# Patient Record
Sex: FEMALE | Race: WHITE | NOT HISPANIC OR LATINO | Employment: OTHER | URBAN - METROPOLITAN AREA
[De-identification: names, ages, dates, MRNs, and addresses within clinical notes are randomized per-mention and may not be internally consistent; named-entity substitution may affect disease eponyms.]

---

## 2017-04-19 ENCOUNTER — MYC MEDICAL ADVICE (OUTPATIENT)
Dept: OBGYN | Facility: CLINIC | Age: 67
End: 2017-04-19

## 2017-04-19 DIAGNOSIS — J45.21 EXTRINSIC ASTHMA WITH EXACERBATION, MILD INTERMITTENT: Primary | ICD-10-CM

## 2017-04-19 RX ORDER — ALBUTEROL SULFATE 90 UG/1
2 AEROSOL, METERED RESPIRATORY (INHALATION) EVERY 6 HOURS
Qty: 1 INHALER | Refills: 0 | Status: SHIPPED | OUTPATIENT
Start: 2017-04-19 | End: 2017-05-09

## 2017-04-19 NOTE — TELEPHONE ENCOUNTER
Pt requesting refill of albuterol (PROAIR HFA, PROVENTIL HFA, VENTOLIN HFA) 108 (90 BASE) MCG/ACT inhaler. Please see my chart message below. No note in chart under last visit regarding refill. Med not dispensed from this office.  Received from [facility], not her PCP. Pt is experiencing a cold or allergies now. Bronchial tube feels tight. Pt is feeling stuffed. Pt does not have a cough. Chest feels fine. Has nasal drainage. Denies fever. Routing to Dr. Chavarria. OK to refill

## 2017-11-13 ENCOUNTER — OFFICE VISIT (OUTPATIENT)
Dept: INTERNAL MEDICINE | Facility: CLINIC | Age: 67
End: 2017-11-13
Payer: COMMERCIAL

## 2017-11-13 VITALS
WEIGHT: 161.1 LBS | HEART RATE: 93 BPM | SYSTOLIC BLOOD PRESSURE: 145 MMHG | TEMPERATURE: 98.5 F | OXYGEN SATURATION: 98 % | HEIGHT: 64 IN | BODY MASS INDEX: 27.5 KG/M2 | DIASTOLIC BLOOD PRESSURE: 83 MMHG

## 2017-11-13 DIAGNOSIS — Z00.00 ENCOUNTER FOR ROUTINE ADULT HEALTH EXAMINATION WITHOUT ABNORMAL FINDINGS: Primary | ICD-10-CM

## 2017-11-13 DIAGNOSIS — F41.1 GENERALIZED ANXIETY DISORDER: ICD-10-CM

## 2017-11-13 DIAGNOSIS — E78.2 MIXED HYPERLIPIDEMIA: ICD-10-CM

## 2017-11-13 DIAGNOSIS — M81.0 OSTEOPOROSIS, UNSPECIFIED OSTEOPOROSIS TYPE, UNSPECIFIED PATHOLOGICAL FRACTURE PRESENCE: ICD-10-CM

## 2017-11-13 PROBLEM — Z87.898 H/O WHEEZING: Status: ACTIVE | Noted: 2017-11-13

## 2017-11-13 LAB — HGB BLD-MCNC: 14 G/DL (ref 11.7–15.7)

## 2017-11-13 PROCEDURE — 36415 COLL VENOUS BLD VENIPUNCTURE: CPT | Performed by: INTERNAL MEDICINE

## 2017-11-13 PROCEDURE — 99387 INIT PM E/M NEW PAT 65+ YRS: CPT | Performed by: INTERNAL MEDICINE

## 2017-11-13 PROCEDURE — 80061 LIPID PANEL: CPT | Performed by: INTERNAL MEDICINE

## 2017-11-13 PROCEDURE — 80053 COMPREHEN METABOLIC PANEL: CPT | Performed by: INTERNAL MEDICINE

## 2017-11-13 PROCEDURE — 85018 HEMOGLOBIN: CPT | Performed by: INTERNAL MEDICINE

## 2017-11-13 RX ORDER — ALENDRONATE SODIUM 70 MG/1
70 TABLET ORAL
Qty: 12 TABLET | Refills: 3 | Status: SHIPPED | OUTPATIENT
Start: 2017-11-13 | End: 2018-11-27

## 2017-11-13 RX ORDER — MECLIZINE HYDROCHLORIDE 25 MG/1
25 TABLET ORAL 3 TIMES DAILY PRN
Qty: 30 TABLET | Refills: 1 | Status: SHIPPED | OUTPATIENT
Start: 2017-11-13 | End: 2024-09-09

## 2017-11-13 RX ORDER — SIMVASTATIN 20 MG
20 TABLET ORAL AT BEDTIME
Qty: 90 TABLET | Refills: 3 | Status: SHIPPED | OUTPATIENT
Start: 2017-11-13 | End: 2018-11-11

## 2017-11-13 RX ORDER — PROPRANOLOL HYDROCHLORIDE 10 MG/1
10 TABLET ORAL 2 TIMES DAILY
Qty: 60 TABLET | Refills: 3 | Status: SHIPPED | OUTPATIENT
Start: 2017-11-13 | End: 2018-11-27

## 2017-11-13 NOTE — NURSING NOTE
"Chief Complaint   Patient presents with     [facility]     New pt.      Medicare Visit     Fasting for blood work, also need refill on medications.       Initial /83 (BP Location: Left arm, Patient Position: Sitting, Cuff Size: Adult Regular)  Pulse 93  Temp 98.5  F (36.9  C) (Oral)  Ht 5' 4\" (1.626 m)  Wt 161 lb 1.6 oz (73.1 kg)  SpO2 98%  BMI 27.65 kg/m2 Estimated body mass index is 27.65 kg/(m^2) as calculated from the following:    Height as of this encounter: 5' 4\" (1.626 m).    Weight as of this encounter: 161 lb 1.6 oz (73.1 kg).  Medication Reconciliation: complete   Lisa Mir MA      "

## 2017-11-13 NOTE — PROGRESS NOTES
SUBJECTIVE:   Carola Cox is a 67 year old female who presents for Preventive Visit.      Answers for HPI/ROS submitted by the patient on 11/10/2017   Annual Exam:  Getting at least 3 servings of Calcium per day:: Yes  Bi-annual eye exam:: Yes  Dental care twice a year:: Yes  Sleep apnea or symptoms of sleep apnea:: None  Diet:: Regular (no restrictions)  Frequency of exercise:: 2-3 days/week  Taking medications regularly:: Yes  Medication side effects:: None  Additional concerns today:: No  PHQ-2 Score: 0  Duration of exercise:: 15-30 minutes    Are you in the first 12 months of your Medicare Part B coverage?  No    COGNITIVE SCREEN  1) Repeat 3 items (Banana, Sunrise, Chair)    2) Clock draw: NORMAL  3) 3 item recall: Recalls 3 objects  Results: 3 items recalled: COGNITIVE IMPAIRMENT LESS LIKELY    Mini-CogTM Copyright S Rachel. Licensed by the author for use in [facility]; reprinted with permission (serafin@[facility]). All rights reserved.         Patient uses albuterol and inhaler when necessary for allergy induced bronchospasm.    H/o Anxiety 'Patient has tried several SSRIs in the past for anxiety. Has not tolerated any but currently taking propranolol 10 mg only as needed for anxiety.    Has history of cardiac dysrhythmia in the past has been evaluated by cardiology and everything was normal per patient.    Has history of positional vertigo and on meclizine as needed. Patient says if meclizine does not work. She [facility] to [facility] vestibular physical therapy.      Wants refill on all her medications today.    Reviewed and updated as needed this visit by clinical staffTobacco  Allergies  Med Hx  Surg Hx  Fam Hx  Soc Hx        Reviewed and updated as needed this visit by Provider          Past Medical History:   Diagnosis Date     Atrophic vaginitis      BPPV (benign paroxysmal positional vertigo)      Cardiac dysrhythmia     beta blocker for this in past      Endometriosis of pelvic peritoneum      Generalized anxiety disorder 2014    cannot tolerate SSRIs. Trial of 12.5mg sertraline and had severe nausea, diarrhea, dizziness. low dose propranolol prn and counseling      History of postmenopausal HRT      Hyperlipidemia      Osteoporosis        Past Surgical History:   Procedure Laterality Date     HYSTERECTOMY TOTAL ABDOMINAL, BILATERAL SALPINGO-OOPHORECTOMY, COMBINED      Endometriosis.     LITHOTRIPSY         Current Outpatient Prescriptions   Medication Sig Dispense Refill     alendronate (FOSAMAX) 70 MG tablet Take 1 tablet (70 mg) by mouth every 7 days Take with over 8 ounces water and stay upright for at least 30 minutes after dose.  Take at least 60 minutes before breakfast 12 tablet 3     meclizine (ANTIVERT) 25 MG tablet Take 1 tablet (25 mg) by mouth 3 times daily as needed for dizziness 30 tablet 1     propranolol (INDERAL) 10 MG tablet Take 1 tablet (10 mg) by mouth 2 times daily As needed for anxiety 60 tablet 3     simvastatin (ZOCOR) 20 MG tablet Take 1 tablet (20 mg) by mouth At Bedtime 90 tablet 3     VENTOLIN  (90 BASE) MCG/ACT Inhaler INHALE 2 PUFFS INTO THE LUNGS EVERY 6 HOURS 18 g 0     Calcium Carbonate-Vitamin D (CALCIUM + D PO)        Cholecalciferol (VITAMIN D PO)        [DISCONTINUED] alendronate (FOSAMAX) 70 MG tablet Take 1 tablet (70 mg) by mouth every 7 days Take with over 8 ounces water and stay upright for at least 30 minutes after dose.  Take at least 60 minutes before breakfast 12 tablet 3     [DISCONTINUED] propranolol (INDERAL) 10 MG tablet Take 1 tablet (10 mg) by mouth 3 times daily As needed for anxiety 30 tablet 3     [DISCONTINUED] simvastatin (ZOCOR) 20 MG tablet Take 1 tablet (20 mg) by mouth At Bedtime 90 tablet 3       Family History   Problem Relation Age of Onset     Hyperlipidemia Father      Hypertension Father      Breast Cancer Maternal Grandmother      HEART DISEASE Other      Family History     Thyroid Disease  Sister        Social History   Substance Use Topics     Smoking status: Former Smoker     Quit date: 1/6/1979     Smokeless tobacco: Never Used     Alcohol use 0.0 oz/week     0 Standard drinks or equivalent per week      Comment: very rare       Occasional    Today's PHQ-2 Score:   PHQ-2 ( 1999 [employer]) 11/13/2017 11/10/2017   Q1: Little interest or pleasure in doing things 0 0   Q2: Feeling down, depressed or hopeless 0 0   PHQ-2 Score 0 0   Q1: Little interest or pleasure in doing things - Not at all   Q2: Feeling down, depressed or hopeless - Not at all   PHQ-2 Score - 0         Do you feel safe in your environment - Yes    Do you have a Health Care Directive?: Yes: Advance Directive has been received and scanned.    Current providers sharing in care for this patient include: Patient Care Team:  Leann Sue MD as PCP - General (Internal Medicine)      Hearing impairment: No    Ability to successfully perform activities of daily living: Yes, no assistance needed     Fall risk:  Fallen 2 or more times in the past year?: No  Any fall with injury in the past year?: No      Home safety:  none identified      The following health maintenance items are reviewed in Epic and correct as of today:Health Maintenance   Topic Date Due     HEPATITIS C SCREENING  02/20/1966     ADVANCE DIRECTIVE PLANNING Q5 YRS  02/20/2003     INFLUENZA VACCINE (SYSTEM ASSIGNED)  09/01/2017     FALL RISK ASSESSMENT  11/10/2017     MAMMO SCREEN Q2 YR (SYSTEM ASSIGNED)  11/19/2017     DEXA Q2 YR  11/19/2017     COLONOSCOPY Q10 YR  01/01/2020     LIPID SCREEN Q5 YR FEMALE (SYSTEM ASSIGNED)  11/15/2021     TETANUS IMMUNIZATION (SYSTEM ASSIGNED)  11/01/2023     PNEUMOCOCCAL  Completed         ROS:  C: NEGATIVE for fever, chills, change in weight  I: NEGATIVE for worrisome rashes, moles or lesions  E: NEGATIVE for vision changes or irritation  E/M: NEGATIVE for ear, mouth and throat problems  R: NEGATIVE for significant cough or SOB  B:  "NEGATIVE for masses, tenderness or discharge  CV: NEGATIVE for chest pain, palpitations or peripheral edema  GI: NEGATIVE for nausea, abdominal pain, heartburn, or change in bowel habits  : NEGATIVE for frequency, dysuria, or hematuria  M: NEGATIVE for significant arthralgias or myalgia  N: NEGATIVE for weakness, dizziness or paresthesias  E: NEGATIVE for temperature intolerance, skin/hair changes  H: NEGATIVE for bleeding problems  P: NEGATIVE for changes in mood or affect    OBJECTIVE:   /83 (BP Location: Left arm, Patient Position: Sitting, Cuff Size: Adult Regular)  Pulse 93  Temp 98.5  F (36.9  C) (Oral)  Ht 5' 4\" (1.626 m)  Wt 161 lb 1.6 oz (73.1 kg)  SpO2 98%  BMI 27.65 kg/m2 Estimated body mass index is 27.65 kg/(m^2) as calculated from the following:    Height as of this encounter: 5' 4\" (1.626 m).    Weight as of this encounter: 161 lb 1.6 oz (73.1 kg).  EXAM:   GENERAL APPEARANCE: healthy, alert and no distress  EYES: Eyes grossly normal to inspection, PERRL and conjunctivae and sclerae normal  HENT: ear canals and TM's normal, nose and mouth without ulcers or lesions, oropharynx clear and oral mucous membranes moist  NECK: no adenopathy, no asymmetry, masses, or scars and thyroid normal to palpation  RESP: lungs clear to auscultation - no rales, rhonchi or wheezes  BREAST: normal without masses, tenderness or nipple discharge and no palpable axillary masses or adenopathy  CV: regular rate and rhythm, normal S1 S2, no S3 or S4, no murmur, click or rub, no peripheral edema and peripheral pulses strong  ABDOMEN: soft, nontender, no hepatosplenomegaly, no masses and bowel sounds normal  MS: no musculoskeletal defects are noted and gait is age appropriate without ataxia  SKIN: no suspicious lesions or rashes  NEURO: Normal strength and tone, sensory exam grossly normal, mentation intact and speech normal  PSYCH: mentation appears normal and affect normal/bright    ASSESSMENT / PLAN: " "      (Z00.00) Encounter for routine adult health examination without abnormal findings  (primary encounter diagnosis)  Plan: Hemoglobin, Comprehensive metabolic panel,         Lipid panel reflex to direct LDL Fasting. Mammogram.            (E78.2) Mixed hyperlipidemia  Comment: Check lipid panel  Plan: simvastatin (ZOCOR) 20 MG tablet refilled.explained clearly about the medication,insructions and side effects.            (M81.0) Osteoporosis, unspecified osteoporosis type, unspecified pathological fracture presence  Plan: alendronate (FOSAMAX) 70 MG tablet refilled.explained clearly about the medication,insructions and side effects.    (F41.1) Generalized anxiety disorder  Comment: Stable  Plan: propranolol (INDERAL) 10 MG tablet refilled.explained clearly about the medication,insructions and side effects.          (H81.13) Positional vertigo, bilateral  Comment: Stable  Plan: meclizine (ANTIVERT) 25 MG tablet refilled as directed,          End of Life Planning:  Patient currently has an advanced directive: Yes: Advance Directive has been received and scanned.    COUNSELING:  Reviewed preventive health counseling, as reflected in patient instructions       Regular exercise       Healthy diet/nutrition      Estimated body mass index is 27.65 kg/(m^2) as calculated from the following:    Height as of this encounter: 5' 4\" (1.626 m).    Weight as of this encounter: 161 lb 1.6 oz (73.1 kg).     reports that she quit smoking about 38 years ago. She has never used smokeless tobacco.        Appropriate preventive services were discussed with this patient, including applicable screening as appropriate for cardiovascular disease, diabetes, osteopenia/osteoporosis, and glaucoma.  As appropriate for age/gender, discussed screening for colorectal cancer, prostate cancer, breast cancer, and cervical cancer. Checklist reviewing preventive services available has been given to the patient.    Reviewed patients plan of care and " provided an AVS. The Basic Care Plan (routine screening as documented in Health Maintenance) for Carola meets the Care Plan requirement. This Care Plan has been established and reviewed with the Patient.    Counseling Resources:  ATP IV Guidelines  Pooled Cohorts Equation Calculator  Breast Cancer Risk Calculator  FRAX Risk Assessment  ICSI Preventive Guidelines  Dietary Guidelines for Americans, 2010  USDA's MyPlate  ASA Prophylaxis  Lung CA Screening    Leann Sue MD  [facility]

## 2017-11-13 NOTE — MR AVS SNAPSHOT
After Visit Summary   11/13/2017    Carola Cox    MRN: [id number]           Patient Information     Date Of Birth          1950        Visit Information        Provider Department      11/13/2017 9:20 AM Leann Sue MD [facility]        Today's Diagnoses     Encounter for routine adult health examination without abnormal findings    -  1    Mixed hyperlipidemia        Osteoporosis, unspecified osteoporosis type, unspecified pathological fracture presence        Generalized anxiety disorder        Positional vertigo, bilateral          Care Instructions      Preventive Health Recommendations    Female Ages 65 +    Yearly exam:     See your health care provider every year in order to  o Review health changes.   o Discuss preventive care.    o Review your medicines if your doctor has prescribed any.      You no longer need a yearly Pap test unless you've had an abnormal Pap test in the past 10 years. If you have vaginal symptoms, such as bleeding or discharge, be sure to talk with your provider about a Pap test.      Every 1 to 2 years, have a mammogram.  If you are over 69, talk with your health care provider about whether or not you want to continue having screening mammograms.      Every 10 years, have a colonoscopy. Or, have a yearly FIT test (stool test). These exams will check for colon cancer.       Have a cholesterol test every 5 years, or more often if your doctor advises it.       Have a diabetes test (fasting glucose) every three years. If you are at risk for diabetes, you should have this test more often.       At age 65, have a bone density scan (DEXA) to check for osteoporosis (brittle bone disease).    Shots:    Get a flu shot each year.    Get a tetanus shot every 10 years.    Talk to your doctor about your pneumonia vaccines. There are now two you should receive - Pneumovax (PPSV 23) and Prevnar (PCV 13).    Talk to your doctor about the  shingles vaccine.    Talk to your doctor about the hepatitis B vaccine.    Nutrition:     Eat at least 5 servings of fruits and vegetables each day.      Eat whole-grain bread, whole-wheat pasta and brown rice instead of white grains and rice.      Talk to your provider about Calcium and Vitamin D.     Lifestyle    Exercise at least 150 minutes a week (30 minutes a day, 5 days a week). This will help you control your weight and prevent disease.      Limit alcohol to one drink per day.      No smoking.       Wear sunscreen to prevent skin cancer.       See your dentist twice a year for an exam and cleaning.      See your eye doctor every 1 to 2 years to screen for conditions such as glaucoma, macular degeneration and cataracts.          Follow-ups after your visit        Who to contact     If you have questions or need follow up information about today's clinic visit or your schedule please contact [facility] directly at [phone].  Normal or non-critical lab and imaging results will be communicated to you by [employer], letter or phone within 4 business days after the clinic has received the results. If you do not hear from us within 7 days, please contact the clinic through [employer] or phone. If you have a critical or abnormal lab result, we will notify you by phone as soon as possible.  Submit refill requests through [employer] or call your pharmacy and they will forward the refill request to us. Please allow 3 business days for your refill to be completed.          Additional Information About Your Visit        [employer] Information     [employer] gives you secure access to your electronic health record. If you see a primary care provider, you can also send messages to your care team and make appointments. If you have questions, please call your primary care clinic.  If you do not have a primary care provider, please call [phone] and they will assist you.        Care EveryWhere ID     This is your  "Care EveryWhere ID. This could be used by other organizations to access your [facility] medical records  [id number]        Your Vitals Were     Pulse Temperature Height Pulse Oximetry BMI (Body Mass Index)       93 98.5  F (36.9  C) (Oral) 5' 4\" (1.626 m) 98% 27.65 kg/m2        Blood Pressure from Last 3 Encounters:   11/13/17 145/83   11/10/16 102/64   02/02/16 130/76    Weight from Last 3 Encounters:   11/13/17 161 lb 1.6 oz (73.1 kg)   11/10/16 165 lb (74.8 kg)   02/02/16 164 lb (74.4 kg)              We Performed the Following     Comprehensive metabolic panel     Hemoglobin     Lipid panel reflex to direct LDL Fasting          Today's Medication Changes          These changes are accurate as of: 11/13/17 10:32 AM.  If you have any questions, ask your nurse or doctor.               These medicines have changed or have updated prescriptions.        Dose/Directions    propranolol 10 MG tablet   Commonly known as:  INDERAL   This may have changed:  when to take this   Used for:  Generalized anxiety disorder   Changed by:  Leann Sue MD        Dose:  10 mg   Take 1 tablet (10 mg) by mouth 2 times daily As needed for anxiety   Quantity:  60 tablet   Refills:  3            Where to get your medicines      These medications were sent to [facility] [address] [place] [address] - [address] AT [place] [address] & [facility]  [address], [facility] [address]     Phone:  [phone]     meclizine 25 MG tablet    propranolol 10 MG tablet    simvastatin 20 MG tablet         Some of these will need a paper prescription and others can be bought over the counter.  Ask your nurse if you have questions.     Bring a paper prescription for each of these medications     alendronate 70 MG tablet                Primary Care Provider Office Phone # Fax #    Leann Sue [phone]       [address]  [facility] [address]        Equal Access to Services     " OLEKSANDR [facility]: Hadii aad ku day Soelvira, waaxda luqadaha, qaybta kaalmada adericarda, gracy alex miltonevelio mcghee nubiakevin boland . So [facility] [phone].    ATENCIÓN: Si habla español, tiene a das disposición servicios gratuitos de asistencia lingüística. Llame al [phone].    We comply with applicable federal civil rights laws and [place] laws. We do not discriminate on the basis of race, color, national origin, age, disability, sex, sexual orientation, or gender identity.            Thank you!     Thank you for choosing [facility]  for your care. Our goal is always to provide you with excellent care. Hearing back from our patients is one way we can continue to improve our services. Please take a few minutes to complete the written survey that you may receive in the mail after your visit with us. Thank you!             Your Updated Medication List - Protect others around you: Learn how to safely use, store and throw away your medicines at www.disposemymeds.org.          This list is accurate as of: 11/13/17 10:32 AM.  Always use your most recent med list.                   Brand Name Dispense Instructions for use Diagnosis    alendronate 70 MG tablet    FOSAMAX    12 tablet    Take 1 tablet (70 mg) by mouth every 7 days Take with over 8 ounces water and stay upright for at least 30 minutes after dose.  Take at least 60 minutes before breakfast    Osteoporosis, unspecified osteoporosis type, unspecified pathological fracture presence       CALCIUM + D PO           meclizine 25 MG tablet    ANTIVERT    30 tablet    Take 1 tablet (25 mg) by mouth 3 times daily as needed for dizziness    Positional vertigo, bilateral       propranolol 10 MG tablet    INDERAL    60 tablet    Take 1 tablet (10 mg) by mouth 2 times daily As needed for anxiety    Generalized anxiety disorder       simvastatin 20 MG tablet    ZOCOR    90 tablet    Take 1 tablet (20 mg) by mouth At Bedtime    Mixed hyperlipidemia        VENTOLIN  (90 BASE) MCG/ACT Inhaler   Generic drug:  albuterol     18 g    INHALE 2 PUFFS INTO THE LUNGS EVERY 6 HOURS    Extrinsic asthma with exacerbation, mild intermittent       VITAMIN D PO

## 2017-11-14 LAB
ALBUMIN SERPL-MCNC: 3.7 G/DL (ref 3.4–5)
ALP SERPL-CCNC: 59 U/L (ref 40–150)
ALT SERPL W P-5'-P-CCNC: 31 U/L (ref 0–50)
ANION GAP SERPL CALCULATED.3IONS-SCNC: 10 MMOL/L (ref 3–14)
AST SERPL W P-5'-P-CCNC: 21 U/L (ref 0–45)
BILIRUB SERPL-MCNC: 0.4 MG/DL (ref 0.2–1.3)
BUN SERPL-MCNC: 10 MG/DL (ref 7–30)
CALCIUM SERPL-MCNC: 9.4 MG/DL (ref 8.5–10.1)
CHLORIDE SERPL-SCNC: 108 MMOL/L (ref 94–109)
CHOLEST SERPL-MCNC: 149 MG/DL
CO2 SERPL-SCNC: 24 MMOL/L (ref 20–32)
CREAT SERPL-MCNC: 0.73 MG/DL (ref 0.52–1.04)
GFR SERPL CREATININE-BSD FRML MDRD: 79 ML/MIN/1.7M2
GLUCOSE SERPL-MCNC: 90 MG/DL (ref 70–99)
HDLC SERPL-MCNC: 68 MG/DL
LDLC SERPL CALC-MCNC: 64 MG/DL
NONHDLC SERPL-MCNC: 81 MG/DL
POTASSIUM SERPL-SCNC: 3.9 MMOL/L (ref 3.4–5.3)
PROT SERPL-MCNC: 7 G/DL (ref 6.8–8.8)
SODIUM SERPL-SCNC: 142 MMOL/L (ref 133–144)
TRIGL SERPL-MCNC: 86 MG/DL

## 2017-11-27 ENCOUNTER — RADIANT APPOINTMENT (OUTPATIENT)
Dept: MAMMOGRAPHY | Facility: CLINIC | Age: 67
End: 2017-11-27
Payer: COMMERCIAL

## 2017-11-27 DIAGNOSIS — Z12.39 SCREENING FOR BREAST CANCER: ICD-10-CM

## 2017-11-27 PROCEDURE — G0202 SCR MAMMO BI INCL CAD: HCPCS | Mod: TC

## 2018-11-11 DIAGNOSIS — E78.2 MIXED HYPERLIPIDEMIA: ICD-10-CM

## 2018-11-11 NOTE — LETTER
[place] [address]  [facility] [address]  Phone: [phone]        November 13, 2018      Carola Cox                                                                                                                                [address] [facility] [address]            Dear Ms. Cox,    We are concerned about your health care.  We recently provided you with a medication refill.  Many medications require routine follow-up with your Doctor.      At this time we ask that: You schedule a fasting lab appointment and an appointment to see your physician 1-2 days later.    Your prescription: Has been refilled for 1 month so you may have time for the above noted follow-up.      Thank you,      Dr. Sue/ triage nurses

## 2018-11-12 NOTE — TELEPHONE ENCOUNTER
"Requested Prescriptions   Pending Prescriptions Disp Refills     simvastatin (ZOCOR) 20 MG tablet [Pharmacy Med Name: SIMVASTATIN 20MG TABLETS]  Last Written Prescription Date:  11/13/2017  Last Fill Quantity: 90,  # refills: 3   Last office visit: 11/13/2017 with prescribing provider:     Future Office Visit:   90 tablet 0     Sig: TAKE 1 TABLET(20 MG) BY MOUTH AT BEDTIME    Statins Protocol Passed    11/11/2018  4:06 PM       Passed - LDL on file in past 12 months    Recent Labs   Lab Test  11/13/17   0957   LDL  64            Passed - No abnormal creatine kinase in past 12 months    No lab results found.            Passed - Recent (12 mo) or future (30 days) visit within the authorizing provider's specialty    Patient had office visit in the last 12 months or has a visit in the next 30 days with authorizing provider or within the authorizing provider's specialty.  See \"Patient Info\" tab in inbasket, or \"Choose Columns\" in Meds & Orders section of the refill encounter.             Passed - Patient is age 18 or older       Passed - No active pregnancy on record       Passed - No positive pregnancy test in past 12 months        "

## 2018-11-13 RX ORDER — SIMVASTATIN 20 MG
TABLET ORAL
Qty: 30 TABLET | Refills: 0 | Status: SHIPPED | OUTPATIENT
Start: 2018-11-13 | End: 2018-11-27

## 2018-11-13 NOTE — TELEPHONE ENCOUNTER
Medication is being filled for 1 time refill only due to:  Patient needs fasting labs and an office visit.       Letter sent advising pt she is due for an office visit after having fasting labs drawn.  DOYLE Nova R.N.

## 2018-11-23 DIAGNOSIS — E78.2 MIXED HYPERLIPIDEMIA: ICD-10-CM

## 2018-11-23 PROCEDURE — 36415 COLL VENOUS BLD VENIPUNCTURE: CPT | Performed by: INTERNAL MEDICINE

## 2018-11-23 PROCEDURE — 80061 LIPID PANEL: CPT | Performed by: INTERNAL MEDICINE

## 2018-11-23 PROCEDURE — 80053 COMPREHEN METABOLIC PANEL: CPT | Performed by: INTERNAL MEDICINE

## 2018-11-24 LAB
ALBUMIN SERPL-MCNC: 3.7 G/DL (ref 3.4–5)
ALP SERPL-CCNC: 61 U/L (ref 40–150)
ALT SERPL W P-5'-P-CCNC: 46 U/L (ref 0–50)
ANION GAP SERPL CALCULATED.3IONS-SCNC: 5 MMOL/L (ref 3–14)
AST SERPL W P-5'-P-CCNC: 42 U/L (ref 0–45)
BILIRUB SERPL-MCNC: 0.4 MG/DL (ref 0.2–1.3)
BUN SERPL-MCNC: 14 MG/DL (ref 7–30)
CALCIUM SERPL-MCNC: 9.2 MG/DL (ref 8.5–10.1)
CHLORIDE SERPL-SCNC: 108 MMOL/L (ref 94–109)
CHOLEST SERPL-MCNC: 158 MG/DL
CO2 SERPL-SCNC: 27 MMOL/L (ref 20–32)
CREAT SERPL-MCNC: 0.82 MG/DL (ref 0.52–1.04)
GFR SERPL CREATININE-BSD FRML MDRD: 69 ML/MIN/1.7M2
GLUCOSE SERPL-MCNC: 96 MG/DL (ref 70–99)
HDLC SERPL-MCNC: 60 MG/DL
LDLC SERPL CALC-MCNC: 80 MG/DL
NONHDLC SERPL-MCNC: 98 MG/DL
POTASSIUM SERPL-SCNC: 3.8 MMOL/L (ref 3.4–5.3)
PROT SERPL-MCNC: 7.4 G/DL (ref 6.8–8.8)
SODIUM SERPL-SCNC: 140 MMOL/L (ref 133–144)
TRIGL SERPL-MCNC: 90 MG/DL

## 2018-11-27 ENCOUNTER — OFFICE VISIT (OUTPATIENT)
Dept: INTERNAL MEDICINE | Facility: CLINIC | Age: 68
End: 2018-11-27
Payer: COMMERCIAL

## 2018-11-27 VITALS
DIASTOLIC BLOOD PRESSURE: 70 MMHG | BODY MASS INDEX: 28.61 KG/M2 | WEIGHT: 166.7 LBS | HEART RATE: 99 BPM | OXYGEN SATURATION: 98 % | SYSTOLIC BLOOD PRESSURE: 112 MMHG | TEMPERATURE: 97.4 F | RESPIRATION RATE: 13 BRPM

## 2018-11-27 DIAGNOSIS — F41.1 GENERALIZED ANXIETY DISORDER: ICD-10-CM

## 2018-11-27 DIAGNOSIS — E78.2 MIXED HYPERLIPIDEMIA: ICD-10-CM

## 2018-11-27 DIAGNOSIS — Z23 NEED FOR TDAP VACCINATION: Primary | ICD-10-CM

## 2018-11-27 DIAGNOSIS — M81.0 OSTEOPOROSIS, UNSPECIFIED OSTEOPOROSIS TYPE, UNSPECIFIED PATHOLOGICAL FRACTURE PRESENCE: ICD-10-CM

## 2018-11-27 PROCEDURE — 90715 TDAP VACCINE 7 YRS/> IM: CPT | Performed by: INTERNAL MEDICINE

## 2018-11-27 PROCEDURE — G0008 ADMIN INFLUENZA VIRUS VAC: HCPCS | Performed by: INTERNAL MEDICINE

## 2018-11-27 PROCEDURE — 99214 OFFICE O/P EST MOD 30 MIN: CPT | Mod: 25 | Performed by: INTERNAL MEDICINE

## 2018-11-27 RX ORDER — PROPRANOLOL HYDROCHLORIDE 10 MG/1
10 TABLET ORAL 2 TIMES DAILY
Qty: 60 TABLET | Refills: 3 | Status: SHIPPED | OUTPATIENT
Start: 2018-11-27 | End: 2019-08-01

## 2018-11-27 RX ORDER — VENLAFAXINE HYDROCHLORIDE 37.5 MG/1
37.5 CAPSULE, EXTENDED RELEASE ORAL DAILY
Qty: 30 CAPSULE | Refills: 3 | Status: SHIPPED | OUTPATIENT
Start: 2018-11-27 | End: 2019-07-22

## 2018-11-27 RX ORDER — SIMVASTATIN 20 MG
20 TABLET ORAL AT BEDTIME
Qty: 90 TABLET | Refills: 3 | Status: SHIPPED | OUTPATIENT
Start: 2018-11-27 | End: 2020-01-30

## 2018-11-27 RX ORDER — ALENDRONATE SODIUM 70 MG/1
70 TABLET ORAL
Qty: 12 TABLET | Refills: 3 | Status: SHIPPED | OUTPATIENT
Start: 2018-11-27 | End: 2019-11-06

## 2018-11-27 ASSESSMENT — ANXIETY QUESTIONNAIRES
2. NOT BEING ABLE TO STOP OR CONTROL WORRYING: NEARLY EVERY DAY
3. WORRYING TOO MUCH ABOUT DIFFERENT THINGS: NEARLY EVERY DAY
7. FEELING AFRAID AS IF SOMETHING AWFUL MIGHT HAPPEN: SEVERAL DAYS
5. BEING SO RESTLESS THAT IT IS HARD TO SIT STILL: NOT AT ALL
6. BECOMING EASILY ANNOYED OR IRRITABLE: NOT AT ALL
1. FEELING NERVOUS, ANXIOUS, OR ON EDGE: SEVERAL DAYS
GAD7 TOTAL SCORE: 8
IF YOU CHECKED OFF ANY PROBLEMS ON THIS QUESTIONNAIRE, HOW DIFFICULT HAVE THESE PROBLEMS MADE IT FOR YOU TO DO YOUR WORK, TAKE CARE OF THINGS AT HOME, OR GET ALONG WITH OTHER PEOPLE: SOMEWHAT DIFFICULT

## 2018-11-27 ASSESSMENT — PATIENT HEALTH QUESTIONNAIRE - PHQ9: 5. POOR APPETITE OR OVEREATING: NOT AT ALL

## 2018-11-27 NOTE — NURSING NOTE
/70  Pulse 99  Temp 97.4  F (36.3  C) (Oral)  Resp 13  Wt 166 lb 11.2 oz (75.6 kg)  SpO2 98%  BMI 28.61 kg/m2  Patient in for follow up on anxiety and lipids.  Lyndsey Arredondo, CMA

## 2018-11-27 NOTE — MR AVS SNAPSHOT
After Visit Summary   11/27/2018    Carola Cox    MRN: [id number]           Patient Information     Date Of Birth          1950        Visit Information        Provider Department      11/27/2018 10:40 AM Leann Sue MD [facility]        Today's Diagnoses     Need for Tdap vaccination    -  1    Generalized anxiety disorder        Osteoporosis, unspecified osteoporosis type, unspecified pathological fracture presence        Mixed hyperlipidemia           Follow-ups after your visit        Who to contact     If you have questions or need follow up information about today's clinic visit or your schedule please contact [facility] directly at [phone].  Normal or non-critical lab and imaging results will be communicated to you by MyChart, letter or phone within 4 business days after the clinic has received the results. If you do not hear from us within 7 days, please contact the clinic through [employer] or phone. If you have a critical or abnormal lab result, we will notify you by phone as soon as possible.  Submit refill requests through [employer] or call your pharmacy and they will forward the refill request to us. Please allow 3 business days for your refill to be completed.          Additional Information About Your Visit        MyChart Information     [employer] gives you secure access to your electronic health record. If you see a primary care provider, you can also send messages to your care team and make appointments. If you have questions, please call your primary care clinic.  If you do not have a primary care provider, please call [phone] and they will assist you.        Care EveryWhere ID     This is your Care EveryWhere ID. This could be used by other organizations to access your [facility] records  [id number]        Your Vitals Were     Pulse Temperature Respirations Pulse Oximetry BMI (Body Mass Index)       99 97.4  F  (36.3  C) (Oral) 13 98% 28.61 kg/m2        Blood Pressure from Last 3 Encounters:   11/27/18 112/70   11/13/17 145/83   11/10/16 102/64    Weight from Last 3 Encounters:   11/27/18 166 lb 11.2 oz (75.6 kg)   11/13/17 161 lb 1.6 oz (73.1 kg)   11/10/16 165 lb (74.8 kg)              We Performed the Following     TDAP VACCINE (ADACEL)          Today's Medication Changes          These changes are accurate as of 11/27/18  6:30 PM.  If you have any questions, ask your nurse or doctor.               Start taking these medicines.        Dose/Directions    venlafaxine 37.5 MG 24 hr capsule   Commonly known as:  EFFEXOR-XR   Used for:  Generalized anxiety disorder   Started by:  Leann Sue MD        Dose:  37.5 mg   Take 1 capsule (37.5 mg) by mouth daily   Quantity:  30 capsule   Refills:  3         These medicines have changed or have updated prescriptions.        Dose/Directions    simvastatin 20 MG tablet   Commonly known as:  ZOCOR   This may have changed:  See the new instructions.   Used for:  Mixed hyperlipidemia   Changed by:  Leann Sue MD        Dose:  20 mg   Take 1 tablet (20 mg) by mouth At Bedtime   Quantity:  90 tablet   Refills:  3            Where to get your medicines      These medications were sent to [facility] [address] [place] [address] [id number] AT [place] [address] [address]  [address], [facility] [address]     Phone:  [phone]     propranolol 10 MG tablet    simvastatin 20 MG tablet         Some of these will need a paper prescription and others can be bought over the counter.  Ask your nurse if you have questions.     Bring a paper prescription for each of these medications     alendronate 70 MG tablet    venlafaxine 37.5 MG 24 hr capsule                Primary Care Provider Office Phone # Fax #    Leann Sue [phone]       [address]  [facility] [address]        Equal Access to  Services     [facility]: Hadii aad ku hadaurelianimesh Brittanielvira, waaxda luqadaha, qaybta kaalmada garth, gracy boland . So [facility] [phone].    ATENCIÓN: Si habla coco, tiene a das disposición servicios gratuitos de asistencia lingüística. Llame al [phone].    We comply with applicable federal civil rights laws and [place] laws. We do not discriminate on the basis of race, color, national origin, age, disability, sex, sexual orientation, or gender identity.            Thank you!     Thank you for choosing [facility]  for your care. Our goal is always to provide you with excellent care. Hearing back from our patients is one way we can continue to improve our services. Please take a few minutes to complete the written survey that you may receive in the mail after your visit with us. Thank you!             Your Updated Medication List - Protect others around you: Learn how to safely use, store and throw away your medicines at www.disposemymeds.org.          This list is accurate as of 11/27/18  6:30 PM.  Always use your most recent med list.                   Brand Name Dispense Instructions for use Diagnosis    alendronate 70 MG tablet    FOSAMAX    12 tablet    Take 1 tablet (70 mg) by mouth every 7 days Take with over 8 ounces water and stay upright for at least 30 minutes after dose.  Take at least 60 minutes before breakfast    Osteoporosis, unspecified osteoporosis type, unspecified pathological fracture presence       CALCIUM + D PO           meclizine 25 MG tablet    ANTIVERT    30 tablet    Take 1 tablet (25 mg) by mouth 3 times daily as needed for dizziness    Positional vertigo, bilateral       propranolol 10 MG tablet    INDERAL    60 tablet    Take 1 tablet (10 mg) by mouth 2 times daily As needed for anxiety    Generalized anxiety disorder       simvastatin 20 MG tablet    ZOCOR    90 tablet    Take 1 tablet (20 mg) by mouth At Bedtime    Mixed  hyperlipidemia       venlafaxine 37.5 MG 24 hr capsule    EFFEXOR-XR    30 capsule    Take 1 capsule (37.5 mg) by mouth daily    Generalized anxiety disorder       VENTOLIN  (90 Base) MCG/ACT inhaler   Generic drug:  albuterol     1 Inhaler    INHALE 2 PUFFS INTO THE LUNGS EVERY 6 HOURS    H/O wheezing       VITAMIN D PO

## 2018-11-28 ASSESSMENT — ANXIETY QUESTIONNAIRES: GAD7 TOTAL SCORE: 8

## 2019-07-11 ENCOUNTER — TELEPHONE (OUTPATIENT)
Dept: INTERNAL MEDICINE | Facility: CLINIC | Age: 69
End: 2019-07-11

## 2019-07-11 DIAGNOSIS — R53.83 OTHER FATIGUE: Primary | ICD-10-CM

## 2019-07-11 NOTE — TELEPHONE ENCOUNTER
Please see message below.    Please place future order if appropriate.    Last office visit 11-27-18    Please advise, thanks.

## 2019-07-11 NOTE — TELEPHONE ENCOUNTER
Pt calling requesting thyroid lab orders. She stated she has been experiencing fatigue, dry skin and weight gain since Spring. She also stated that she has a strong family history of thyroid issues. Pt has Pre-op scheduled 7/31/19 with Dr. Sue and would like to go over results at that appointment. Pt can be reached at [phone]. OK to leave a detailed message. Please advise. Thanks.

## 2019-07-12 DIAGNOSIS — R53.83 OTHER FATIGUE: ICD-10-CM

## 2019-07-12 LAB — TSH SERPL DL<=0.005 MIU/L-ACNC: 0.95 MU/L (ref 0.4–4)

## 2019-07-12 PROCEDURE — 84443 ASSAY THYROID STIM HORMONE: CPT | Performed by: INTERNAL MEDICINE

## 2019-07-12 PROCEDURE — 36415 COLL VENOUS BLD VENIPUNCTURE: CPT | Performed by: INTERNAL MEDICINE

## 2019-07-22 ENCOUNTER — APPOINTMENT (OUTPATIENT)
Dept: CARDIOLOGY | Facility: CLINIC | Age: 69
End: 2019-07-22
Attending: EMERGENCY MEDICINE
Payer: MEDICARE

## 2019-07-22 ENCOUNTER — HOSPITAL ENCOUNTER (EMERGENCY)
Facility: CLINIC | Age: 69
Discharge: HOME | End: 2019-07-22
Attending: EMERGENCY MEDICINE | Admitting: EMERGENCY MEDICINE
Payer: MEDICARE

## 2019-07-22 VITALS
SYSTOLIC BLOOD PRESSURE: 146 MMHG | HEIGHT: 64 IN | RESPIRATION RATE: 11 BRPM | WEIGHT: 160 LBS | OXYGEN SATURATION: 97 % | DIASTOLIC BLOOD PRESSURE: 74 MMHG | HEART RATE: 67 BPM | TEMPERATURE: 98.5 F | BODY MASS INDEX: 27.31 KG/M2

## 2019-07-22 DIAGNOSIS — I49.3 PVC'S (PREMATURE VENTRICULAR CONTRACTIONS): ICD-10-CM

## 2019-07-22 DIAGNOSIS — R00.2 PALPITATIONS: ICD-10-CM

## 2019-07-22 LAB
ANION GAP SERPL CALCULATED.3IONS-SCNC: 5 MMOL/L (ref 3–14)
BASOPHILS # BLD AUTO: 0.1 10E9/L (ref 0–0.2)
BASOPHILS NFR BLD AUTO: 0.7 %
BUN SERPL-MCNC: 11 MG/DL (ref 7–30)
CALCIUM SERPL-MCNC: 9 MG/DL (ref 8.5–10.1)
CHLORIDE SERPL-SCNC: 110 MMOL/L (ref 94–109)
CO2 SERPL-SCNC: 27 MMOL/L (ref 20–32)
CREAT SERPL-MCNC: 0.68 MG/DL (ref 0.52–1.04)
DIFFERENTIAL METHOD BLD: NORMAL
EOSINOPHIL # BLD AUTO: 0.2 10E9/L (ref 0–0.7)
EOSINOPHIL NFR BLD AUTO: 1.8 %
ERYTHROCYTE [DISTWIDTH] IN BLOOD BY AUTOMATED COUNT: 12.9 % (ref 10–15)
GFR SERPL CREATININE-BSD FRML MDRD: 88 ML/MIN/{1.73_M2}
GLUCOSE SERPL-MCNC: 102 MG/DL (ref 70–99)
HCT VFR BLD AUTO: 45.9 % (ref 35–47)
HGB BLD-MCNC: 14.8 G/DL (ref 11.7–15.7)
IMM GRANULOCYTES # BLD: 0 10E9/L (ref 0–0.4)
IMM GRANULOCYTES NFR BLD: 0.1 %
INTERPRETATION ECG - MUSE: NORMAL
LYMPHOCYTES # BLD AUTO: 1.5 10E9/L (ref 0.8–5.3)
LYMPHOCYTES NFR BLD AUTO: 18.6 %
MAGNESIUM SERPL-MCNC: 2.4 MG/DL (ref 1.6–2.3)
MCH RBC QN AUTO: 29.8 PG (ref 26.5–33)
MCHC RBC AUTO-ENTMCNC: 32.2 G/DL (ref 31.5–36.5)
MCV RBC AUTO: 93 FL (ref 78–100)
MONOCYTES # BLD AUTO: 0.6 10E9/L (ref 0–1.3)
MONOCYTES NFR BLD AUTO: 7.3 %
NEUTROPHILS # BLD AUTO: 5.8 10E9/L (ref 1.6–8.3)
NEUTROPHILS NFR BLD AUTO: 71.5 %
NRBC # BLD AUTO: 0 10*3/UL
NRBC BLD AUTO-RTO: 0 /100
PLATELET # BLD AUTO: 233 10E9/L (ref 150–450)
POTASSIUM SERPL-SCNC: 3.8 MMOL/L (ref 3.4–5.3)
RBC # BLD AUTO: 4.96 10E12/L (ref 3.8–5.2)
SODIUM SERPL-SCNC: 142 MMOL/L (ref 133–144)
TROPONIN I SERPL-MCNC: <0.015 UG/L (ref 0–0.04)
WBC # BLD AUTO: 8.2 10E9/L (ref 4–11)

## 2019-07-22 PROCEDURE — 0296T ZIO PATCH HOLTER ADULT PEDIATRIC GREATER THAN 48 HRS: CPT

## 2019-07-22 PROCEDURE — 80048 BASIC METABOLIC PNL TOTAL CA: CPT | Performed by: EMERGENCY MEDICINE

## 2019-07-22 PROCEDURE — 84484 ASSAY OF TROPONIN QUANT: CPT | Performed by: EMERGENCY MEDICINE

## 2019-07-22 PROCEDURE — 93005 ELECTROCARDIOGRAM TRACING: CPT | Mod: 59

## 2019-07-22 PROCEDURE — 0298T ZZC EXT ECG > 48HR TO 21 DAY REVIEW AND INTERPRETATN: CPT | Performed by: INTERNAL MEDICINE

## 2019-07-22 PROCEDURE — 99284 EMERGENCY DEPT VISIT MOD MDM: CPT

## 2019-07-22 PROCEDURE — 83735 ASSAY OF MAGNESIUM: CPT | Performed by: EMERGENCY MEDICINE

## 2019-07-22 PROCEDURE — 85025 COMPLETE CBC W/AUTO DIFF WBC: CPT | Performed by: EMERGENCY MEDICINE

## 2019-07-22 ASSESSMENT — ENCOUNTER SYMPTOMS
VOMITING: 0
COUGH: 0
NAUSEA: 0
FEVER: 0
DIAPHORESIS: 0
LIGHT-HEADEDNESS: 0
PALPITATIONS: 1
SHORTNESS OF BREATH: 0

## 2019-07-22 ASSESSMENT — MIFFLIN-ST. JEOR: SCORE: 1225.76

## 2019-07-22 NOTE — ED PROVIDER NOTES
History     Chief Complaint:  Palpitations    The history is provided by the patient.      Carola Cox is a 69 year old female, with a history of hyperlipidemia, who presents to the ED for evaluation of palpitations. The patient reports she had a cardiac monitor approximately 35 years ago for intermittent palpitations. She was placed on a small dose of Atenolol. About 10 years ago, she asked her cardiologist if she could be taken off Atenolol and this was agreed on. The patient notes she developed palpitations that is worse and persists longer than usual at 10:30AM today. She feels as her heart is skipping a beat. She did take Propranolol. She is prescribed Propranolol for anxiety which she takes intermittently. The patient denies any fever, diaphoresis, lightheadedness, cough, shortness of breath, chest pain, nausea, or vomiting.     CARDIAC RISK FACTORS:  Sex:    Female  Tobacco:   Former smoker, Quit 1979  Hypertension:   No  Hyperlipidemia:  Yes  Diabetes:   No  Family History:  No    PE/DVT RISK FACTORS:  Sex:    Female  Hormones:   No  Tobacco:   Former smoker, Quit 1979  Cancer:   No  Travel:   No  Surgery:   No  Other immobilization: No  Personal history:  No  Family history:  No    Allergies:  Penicillin V: hives   Cefzil: hives      Medications:    Fosamax  Vitamin D  Inderal  Simvastatin   Meclizine  Ventolin inhaler  Advair inhaler  Calcium-Vitamin D2  Xanax  Asmanex inhaler    Past Medical History:    Kidney stones  Atrophic vaginitis   Osteoporosis  Anxiety   Endometriosis, pelvic peritoneum  BPPV  HLD  Asthma   GERD  Uninodular goiter    Past Surgical History:    SANDY-BSO  Lithotripsy   Tonsillectomy    Family History:    HLD, HTN: father  Thyroid disease: sister     Social History:  Smoking status: Former smoker, Quit 1984  Alcohol use: Very rare  Presents to ED alone    Marital Status:   [5]     Review of Systems   Constitutional: Negative for diaphoresis and fever.   Respiratory:  "Negative for cough and shortness of breath.    Cardiovascular: Positive for palpitations. Negative for chest pain.   Gastrointestinal: Negative for nausea and vomiting.   Neurological: Negative for light-headedness.   All other systems reviewed and are negative.    Physical Exam     Patient Vitals for the past 24 hrs:   BP Temp Temp src Pulse Heart Rate Resp SpO2 Height Weight   07/22/19 1400 (!) 147/107 -- -- 69 67 18 96 % -- --   07/22/19 1237 160/85 98.5  F (36.9  C) Oral 68 -- 12 98 % -- --   07/22/19 1235 -- -- -- -- -- -- -- 1.626 m (5' 4\") 72.6 kg (160 lb)     Physical Exam  Constitutional: Vital signs reviewed as above.   Head: No external signs of trauma noted.  Eyes: Pupils are equal, round, and reactive to light.   Neck: No JVD noted  Cardiovascular: Normal rate, regular rhythm and normal heart sounds.  No murmur heard. Equal B/L peripheral pulses.  Pulmonary/Chest: Effort normal and breath sounds normal. No respiratory distress. Patient has no wheezes. Patient has no rales.   Gastrointestinal: Soft. There is no tenderness.   Musculoskeletal/Extremities: No edema noted. Normal tone.  Neurological: Patient is alert and oriented to person, place, and time.   Skin: Skin is warm and dry. There is no diaphoresis noted.   Psychiatric: The patient appears calm.    Emergency Department Course     ECG (12:50:10):  Rate 69 bpm. MN interval 148. QRS duration 74. QT/QTc 390/417. P-R-T axes 74 0 30. Normal sinus rhythm. Normal ECG. No significant change compared to EKG dated 7/22/2011. Interpreted at 1253 by Donn Dennis DO.    Laboratory:  Laboratory findings were communicated with the patient who voiced understanding of the findings.    Troponin I (1304): <0.015    Magnesium: 2.4(H)    CBC: o/w WNL (WBC 8.2, HGB 14.8, )  BMP: Chloride 110(H), Glucose 102(H), o/w WNL (Creatinine 0.68)     Emergency Department Course:  Patient arrived by EMS.     Past medical records, nursing notes, and vitals " reviewed.  1246: I performed an exam of the patient and obtained history, as documented above.    1250: EKG obtained.     1304: IV inserted and blood drawn.    1350: I rechecked the patient. Explained findings to patient.    Patient had a Zio patch placed while in the ED.     Findings and plan explained to the Patient. Patient discharged home with instructions regarding supportive care, medications, and reasons to return. The importance of close follow-up was reviewed.     Impression & Plan      Medical Decision Making:  This 69 year old female presents to the ED due to palpitations. Please see the HPI and exam for specifics. Patient remained well in the ED. EKG did not note any acute arrhythmias. I did note a couple occasional PVC's on the telemetry monitor, but nothing that was prolonged. As it has been several decades since the patient last had a wearable heart monitor and in light of the fact that she's well-appearing in the ED, I believe she can be discharged. I will place an order for Zio patch and will have that placed in the ED. She should wear it for a week and then follow in the outpatient setting with her primary care physician and or cardiologist. Anticipatory guidance given prior to discharge.    Diagnosis:    ICD-10-CM   1. Palpitations R00.2   2. PVC's (premature ventricular contractions) I49.3     Disposition: Patient discharged to home     Carla Mabry  7/22/2019   [facility] EMERGENCY DEPARTMENT    Scribe Disclosure:  Carla PRATT, am serving as a scribe at 12:46 PM on 7/22/2019 to document services personally performed by Donn Dennis DO based on my observations and the provider's statements to me.        Donn Dennis DO  07/22/19 9699

## 2019-07-22 NOTE — ED AVS SNAPSHOT
[facility] Emergency Department  [address]  [facility] [address]  Phone:  [phone]  Fax:  [phone]                                    Carola Cox   MRN: [id number]    Department:  [facility] Emergency Department   Date of Visit:  7/22/2019           After Visit Summary Signature Page    I have received my discharge instructions, and my questions have been answered. I have discussed any challenges I see with this plan with the nurse or doctor.    ..........................................................................................................................................  Patient/Patient Representative Signature      ..........................................................................................................................................  Patient Representative Print Name and Relationship to Patient    ..................................................               ................................................  Date                                   Time    ..........................................................................................................................................  Reviewed by Signature/Title    ...................................................              ..............................................  Date                                               Time          22EPIC Rev 08/18

## 2019-08-01 ENCOUNTER — OFFICE VISIT (OUTPATIENT)
Dept: INTERNAL MEDICINE | Facility: CLINIC | Age: 69
End: 2019-08-01
Payer: MEDICARE

## 2019-08-01 VITALS
RESPIRATION RATE: 16 BRPM | OXYGEN SATURATION: 95 % | HEIGHT: 64 IN | BODY MASS INDEX: 28.65 KG/M2 | DIASTOLIC BLOOD PRESSURE: 84 MMHG | SYSTOLIC BLOOD PRESSURE: 136 MMHG | WEIGHT: 167.8 LBS | HEART RATE: 90 BPM | TEMPERATURE: 99.9 F

## 2019-08-01 DIAGNOSIS — H26.9 CATARACT OF BOTH EYES, UNSPECIFIED CATARACT TYPE: ICD-10-CM

## 2019-08-01 DIAGNOSIS — E78.5 HYPERLIPIDEMIA, UNSPECIFIED HYPERLIPIDEMIA TYPE: ICD-10-CM

## 2019-08-01 DIAGNOSIS — F41.1 GENERALIZED ANXIETY DISORDER: ICD-10-CM

## 2019-08-01 DIAGNOSIS — R00.2 PALPITATIONS: ICD-10-CM

## 2019-08-01 DIAGNOSIS — Z01.818 PREOP GENERAL PHYSICAL EXAM: Primary | ICD-10-CM

## 2019-08-01 PROCEDURE — 99215 OFFICE O/P EST HI 40 MIN: CPT | Performed by: INTERNAL MEDICINE

## 2019-08-01 RX ORDER — PROPRANOLOL HYDROCHLORIDE 10 MG/1
10 TABLET ORAL 2 TIMES DAILY
Qty: 60 TABLET | Refills: 3 | Status: SHIPPED | OUTPATIENT
Start: 2019-08-01 | End: 2019-11-06

## 2019-08-01 RX ORDER — ATENOLOL 25 MG/1
12.5 TABLET ORAL DAILY
Qty: 45 TABLET | Refills: 1 | Status: SHIPPED | OUTPATIENT
Start: 2019-08-01 | End: 2019-10-17

## 2019-08-01 ASSESSMENT — MIFFLIN-ST. JEOR: SCORE: 1261.14

## 2019-08-01 NOTE — PATIENT INSTRUCTIONS
Plan:  1. In the morning of the surgery day she will take no meds she will resume the  meds  after surgery.  2. Atenolol 12.5 mg 1-2 times a day as needed for palpitations

## 2019-08-01 NOTE — PROGRESS NOTES
[place] [address]  [facility] [address]  [phone]  Dept: [phone]    PRE-OP EVALUATION:  Today's date: 2019    Carola Cox (: 1950) presents for pre-operative evaluation assessment as requested by Dr. Gilbert Paredes.  She requires evaluation and anesthesia risk assessment prior to undergoing surgery/procedure for treatment of cataract .    Fax number for surgical facility: [phone]  Primary Physician: Leann Sue  Type of Anesthesia Anticipated: to be determined    Patient has a Health Care Directive or Living Will:  YES     Preop Questions 2019   Who is doing your surgery? gilbert paredes   What are you having done? cataract   Date of Surgery/Procedure:  left eye   Right eye   Facility or Hospital where procedure/surgery will be performed: -   1.  Do you have a history of Heart attack, stroke, stent, coronary bypass surgery, or other heart surgery? No   2.  Do you ever have any pain or discomfort in your chest? No   3.  Do you have a history of  Heart Failure? No   4.   Are you troubled by shortness of breath when:  walking on a level surface, or up a slight hill, or at night? No   5.  Do you currently have a cold, bronchitis or other respiratory infection? No   6.  Do you have a cough, shortness of breath, or wheezing? No   7.  Do you sometimes get pains in the calves of your legs when you walk? No   8. Do you or anyone in your family have previous history of blood clots? No   9.  Do you or does anyone in your family have a serious bleeding problem such as prolonged bleeding following surgeries or cuts? No   10. Have you ever had problems with anemia or been told to take iron pills? No   11. Have you had any abnormal blood loss such as black, tarry or bloody stools, or abnormal vaginal bleeding? No   12. Have you ever had a blood transfusion? No   13. Have you or any of your relatives ever had problems with  anesthesia? YES - sensitive to sedation: decreased respiratory rate and takes long time to wake    14. Do you have sleep apnea, excessive snoring or daytime drowsiness? YES - she snores, she has a mouth guard    15. Do you have any prosthetic heart valves? No   16. Do you have prosthetic joints? No   17. Is there any chance that you may be pregnant? No       CC:  Preop for multiple medical problems.    HPI:    The patient is scheduled for cataract surgery with Dr. Tran on  Aug 13 and 27  She would like to discuss anxiety, palpitations  and f/u ER  -- she has been evaluated in cardiology by dr Singer for benign palpitations  She stopped the Atenolol 12.5 mg in 2012.   -- she had Holter monitor. Results are no in the chart. Palpitations on/off. I advised her to resume the Atenolol   No other acute complaints.    Assessment:  1. V72.83H Preop general physical exam _ I do not see any major contraindications for the patient to go through the scheduled surgery.    The proposed surgical procedure is considered   LOW surgery risk.    For above listed surgery and anesthesia, Patient is at , LOW  risk for surgery/procedure and perioperative/procedure complications.      Cardiovascular risk  Assessment  -- low risk   -- No further cardiac work up is needed before this surgery.        ECG:    sinus rhythm, no changes suggestive for ischemia    Pulmonary Risk Assessment -- low risk   -- The patient doesn't have chronic lung disease nor acute respiratory problems     Obstructive Sleep Apnea (or suspected sleep apnea)  -- Patient wears a mouth guard       Anemia Assessment :  -- no anemia - patient doesn't need further anemia work up      Blood Sugar Assessment  -- patient doesn't have DM      Anticoagulation assessment  -- patient does not take anticoagulation meds      (F41.1) Generalized anxiety disorder  Comment: Controlled    Plan: propranolol (INDERAL) 10 MG tablet            (R00.2) Palpitations  Comment:   Plan:  "atenolol (TENORMIN) 25 MG tablet            (E78.5) Hyperlipidemia, unspecified hyperlipidemia type  Comment: Controlled    Plan: Continue same meds, same doses for now      (H26.9) Cataract of both eyes, unspecified cataract type  Comment:   Plan: surgery, as above     Plan:  1. In the morning of the surgery day she will take no meds she will resume the  meds  after surgery.  2. Atenolol 12.5 mg 1-2 times a day as needed for palpitations     Physical exam:    Blood pressure 136/84, pulse 90, temperature 99.9  F (37.7  C), temperature source Oral, resp. rate 16, height 1.626 m (5' 4\"), weight 76.1 kg (167 lb 12.8 oz), SpO2 95 %, not currently breastfeeding.   NAD, appears comfortable  Skin clear, no rashes  HEENT: PERRLA, EOMI, anicteric sclera, pink conjunctiva, external ears appear normal, bilateral tympanic membranes clinically normal, oropharynx normal color.  Neck: supple, no JVD,  no thyroidmegaly  Lymph nodes non palpable in the cervical, supraclavicular   Chest: clear to auscultation with good respiratory effort  Cardiac: S1S2, RRR, no mgr appreciated  Abdomen: soft, not tender, not distended, audible bowel sound, no hepatosplenomegaly, no palpable masses, no abdominal bruits  Extremities: no cyanosis, clubbing or edema.   Neuro: A, Ox3, no focal signs.        ROS:   as above     Patient Active Problem List   Diagnosis     CARDIOVASCULAR SCREENING; LDL GOAL LESS THAN 160     Osteoporosis     Generalized anxiety disorder     BPPV (benign paroxysmal positional vertigo)     Hyperlipidemia     Cardiac dysrhythmia     Atrophic vaginitis     H/O wheezing        Past Medical History:   Diagnosis Date     Atrophic vaginitis      BPPV (benign paroxysmal positional vertigo)      Cardiac dysrhythmia     beta blocker for this in past     Endometriosis of pelvic peritoneum      Generalized anxiety disorder 2014    cannot tolerate SSRIs. Trial of 12.5mg sertraline and had severe nausea, diarrhea, dizziness. low dose " propranolol prn and counseling      History of postmenopausal HRT      Hyperlipidemia      Osteoporosis       Past Surgical History:   Procedure Laterality Date     HYSTERECTOMY TOTAL ABDOMINAL, BILATERAL SALPINGO-OOPHORECTOMY, COMBINED      Endometriosis.     LITHOTRIPSY          PSHx: complications with prior surgeries or anesthesia  as above     Soc Hx: No daily alcohol, no smoking     Family History   Problem Relation Age of Onset     Hyperlipidemia Father      Hypertension Father      Breast Cancer Maternal Grandmother      Heart Disease Other         Family History     Thyroid Disease Sister         All: reviewed    Meds: reviewed  Current Outpatient Medications   Medication Sig Dispense Refill     alendronate (FOSAMAX) 70 MG tablet Take 1 tablet (70 mg) by mouth every 7 days Take with over 8 ounces water and stay upright for at least 30 minutes after dose.  Take at least 60 minutes before breakfast 12 tablet 3     Cholecalciferol (VITAMIN D PO)        meclizine (ANTIVERT) 25 MG tablet Take 1 tablet (25 mg) by mouth 3 times daily as needed for dizziness 30 tablet 1     propranolol (INDERAL) 10 MG tablet Take 1 tablet (10 mg) by mouth 2 times daily As needed for anxiety 60 tablet 3     simvastatin (ZOCOR) 20 MG tablet Take 1 tablet (20 mg) by mouth At Bedtime 90 tablet 3     VENTOLIN  (90 Base) MCG/ACT Inhaler INHALE 2 PUFFS INTO THE LUNGS EVERY 6 HOURS 1 Inhaler 3            Evelyne Leal MD  Internal Medicine       MEDICAL HISTORY:     Patient Active Problem List    Diagnosis Date Noted     H/O wheezing 11/13/2017     Priority: Medium     Allergy induced, takes albuterol prn        Hyperlipidemia      Priority: Medium     Cardiac dysrhythmia      Priority: Medium     beta blocker for this in past       Atrophic vaginitis      Priority: Medium     Osteoporosis      Priority: Medium     Generalized anxiety disorder      Priority: Medium     cannot tolerate SSRIs. Trial of 12.5mg sertraline and had  severe nausea, diarrhea, dizziness. low dose propranolol prn and counseling        BPPV (benign paroxysmal positional vertigo)      Priority: Medium     CARDIOVASCULAR SCREENING; LDL GOAL LESS THAN 160 2013     Priority: Medium      Past Medical History:   Diagnosis Date     Atrophic vaginitis      BPPV (benign paroxysmal positional vertigo)      Cardiac dysrhythmia     beta blocker for this in past     Endometriosis of pelvic peritoneum      Generalized anxiety disorder 2014    cannot tolerate SSRIs. Trial of 12.5mg sertraline and had severe nausea, diarrhea, dizziness. low dose propranolol prn and counseling      History of postmenopausal HRT      Hyperlipidemia      Osteoporosis      Past Surgical History:   Procedure Laterality Date     HYSTERECTOMY TOTAL ABDOMINAL, BILATERAL SALPINGO-OOPHORECTOMY, COMBINED      Endometriosis.     LITHOTRIPSY       Current Outpatient Medications   Medication Sig Dispense Refill     alendronate (FOSAMAX) 70 MG tablet Take 1 tablet (70 mg) by mouth every 7 days Take with over 8 ounces water and stay upright for at least 30 minutes after dose.  Take at least 60 minutes before breakfast 12 tablet 3     Cholecalciferol (VITAMIN D PO)        meclizine (ANTIVERT) 25 MG tablet Take 1 tablet (25 mg) by mouth 3 times daily as needed for dizziness 30 tablet 1     propranolol (INDERAL) 10 MG tablet Take 1 tablet (10 mg) by mouth 2 times daily As needed for anxiety 60 tablet 3     simvastatin (ZOCOR) 20 MG tablet Take 1 tablet (20 mg) by mouth At Bedtime 90 tablet 3     VENTOLIN  (90 Base) MCG/ACT Inhaler INHALE 2 PUFFS INTO THE LUNGS EVERY 6 HOURS 1 Inhaler 3     OTC products: None, except as noted above    Allergies   Allergen Reactions     Cefprozil Hives     Cefzil     Penicillin V       Latex Allergy: NO    Social History     Tobacco Use     Smoking status: Former Smoker     Last attempt to quit: 1979     Years since quittin.5     Smokeless tobacco: Never Used    Substance Use Topics     Alcohol use: Yes     Alcohol/week: 0.0 oz     Comment: very rare     History   Drug Use No       REVIEW OF SYSTEMS:         Recent Labs   Lab Test 07/22/19  1304 11/23/18  0909 11/13/17  0957   HGB 14.8  --  14.0     --   --     140 142   POTASSIUM 3.8 3.8 3.9   CR 0.68 0.82 0.73      Signed Electronically by: Evelyne Simmons MD    Copy of this evaluation report is provided to requesting physician.    [facility] Preop Guidelines    Revised Cardiac Risk Index

## 2019-08-01 NOTE — NURSING NOTE
"Vital signs:  Temp: 99.9  F (37.7  C) Temp src: Oral BP: 136/84 Pulse: 90   Resp: 16 SpO2: 95 %     Height: 162.6 cm (5' 4\") Weight: 76.1 kg (167 lb 12.8 oz)  Estimated body mass index is 28.8 kg/m  as calculated from the following:    Height as of this encounter: 1.626 m (5' 4\").    Weight as of this encounter: 76.1 kg (167 lb 12.8 oz).     Decline ACT- pt does not have asthma but allergies.           "

## 2019-08-02 ASSESSMENT — ASTHMA QUESTIONNAIRES: ACT_TOTALSCORE: 25

## 2019-08-08 ENCOUNTER — TELEPHONE (OUTPATIENT)
Dept: INTERNAL MEDICINE | Facility: CLINIC | Age: 69
End: 2019-08-08

## 2019-08-08 NOTE — TELEPHONE ENCOUNTER
"Patient advised of Zio Patch findings.  She states she tried taking the Atenolol but it caused her to be extremely tired and she states she could hardly function.  She has resumed Propranolol 10 mg \"as needed\" for palpitations.  States she has never taken it twice daily as ordered, only as needed.  She wants to know what MD would like her to do:  1.  Try taking Atenolol again as she will if that is what MD wants her to do  2.  Take Propranolol twice daily as originally ordered  3.  Continue taking Propranolol as needed for palpitations--about once a week  DOYLE Nova R.N.    "

## 2019-08-08 NOTE — TELEPHONE ENCOUNTER
Holter showed a normal rhythm with 7 runs of fast heart rate up to 10 beats long. Otherwise normal   The atenolol Dr Leal restarted should take care of this   Will leave for Dr Sue to review on return

## 2019-08-08 NOTE — TELEPHONE ENCOUNTER
Reason for Call:  Request for results:    Name of test or procedure: Holter Monitor    Date of test of procedure: 7/22/19    Location of the test or procedure: [facility]    OK to leave the result message on voice mail or with a family member? YES    Phone number Patient can be reached at:  Cell number on file:    Telephone Information:   Mobile [phone]       Additional comments: Please call patient with results of Holter monitor test    Call taken on 8/8/2019 at 10:08 AM by Sepideh Anderson

## 2019-08-08 NOTE — TELEPHONE ENCOUNTER
See message below and advise.    Primary care provider is out of the office, will route to covering provider.

## 2019-09-30 ENCOUNTER — HEALTH MAINTENANCE LETTER (OUTPATIENT)
Age: 69
End: 2019-09-30

## 2019-10-07 DIAGNOSIS — R00.2 PALPITATIONS: ICD-10-CM

## 2019-10-08 NOTE — TELEPHONE ENCOUNTER
"Requested Prescriptions   Pending Prescriptions Disp Refills     atenolol (TENORMIN) 25 MG tablet [Pharmacy Med Name: ATENOLOL 25 MG  Last Written Prescription Date:  8/1/2019  Last Fill Quantity: 45,  # refills: 1   Last office visit: 8/1/2019 with prescribing provider:     Future Office Visit:   TABLET] 45 tablet 1     Sig: TAKE 1/2 TABLET BY MOUTH DAILY       Beta-Blockers Protocol Passed - 10/7/2019 11:38 AM        Passed - Blood pressure under 140/90 in past 12 months     BP Readings from Last 3 Encounters:   08/01/19 136/84   07/22/19 146/74   11/27/18 112/70                 Passed - Patient is age 6 or older        Passed - Recent (12 mo) or future (30 days) visit within the authorizing provider's specialty     Patient has had an office visit with the authorizing provider or a provider within the authorizing providers department within the previous 12 mos or has a future within next 30 days. See \"Patient Info\" tab in inbasket, or \"Choose Columns\" in Meds & Orders section of the refill encounter.              Passed - Medication is active on med list        "

## 2019-10-09 RX ORDER — ATENOLOL 25 MG/1
TABLET ORAL
Qty: 45 TABLET | Refills: 1 | OUTPATIENT
Start: 2019-10-09

## 2019-10-10 DIAGNOSIS — R00.2 PALPITATIONS: ICD-10-CM

## 2019-10-11 RX ORDER — ATENOLOL 25 MG/1
TABLET ORAL
Qty: 45 TABLET | Refills: 1 | OUTPATIENT
Start: 2019-10-11

## 2019-10-11 NOTE — TELEPHONE ENCOUNTER
"Requested Prescriptions   Pending Prescriptions Disp Refills     atenolol (TENORMIN) 25 MG tablet [Pharmacy Med Name: ATENOLOL 25 MG TABLET] 45 tablet 1     Sig: TAKE 1/2 TABLET BY MOUTH DAILY   Last Written Prescription Date:  08/01/2019  Last Fill Quantity: 45,  # refills: 01   Last office visit: 8/1/2019 with prescribing provider:     Future Office Visit:      Beta-Blockers Protocol Passed - 10/10/2019  6:23 PM        Passed - Blood pressure under 140/90 in past 12 months     BP Readings from Last 3 Encounters:   08/01/19 136/84   07/22/19 146/74   11/27/18 112/70                 Passed - Patient is age 6 or older        Passed - Recent (12 mo) or future (30 days) visit within the authorizing provider's specialty     Patient has had an office visit with the authorizing provider or a provider within the authorizing providers department within the previous 12 mos or has a future within next 30 days. See \"Patient Info\" tab in inbasket, or \"Choose Columns\" in Meds & Orders section of the refill encounter.              Passed - Medication is active on med list        "

## 2019-10-17 ENCOUNTER — TELEPHONE (OUTPATIENT)
Dept: INTERNAL MEDICINE | Facility: CLINIC | Age: 69
End: 2019-10-17

## 2019-10-17 DIAGNOSIS — R00.2 PALPITATIONS: ICD-10-CM

## 2019-10-17 RX ORDER — ATENOLOL 25 MG/1
12.5 TABLET ORAL DAILY
Qty: 45 TABLET | Refills: 0 | Status: SHIPPED | OUTPATIENT
Start: 2019-10-17 | End: 2019-11-06

## 2019-10-17 NOTE — TELEPHONE ENCOUNTER
[facility] calling for refill on Atenolol 25mg 1/2 tab daily.    Per chart, medication e-scribed 8-1-19 #45 tabs with 1 refill.  Per Rani @ [facility], prescription was transferred to [facility] on [address] in [place] per patient's request.    Spoke with Isaias @ [facility] pharmacy.  Prescription was transferred from [facility] but the 1 refill was not included so no refills remain on prescription.  [facility] dispensed medication to patient on 9-10-19 #45 tabs but patient should have medication remaining.    Spoke with patient.  States the Atenolol pills at [facility] are very small and difficult to split in half so she has wasted many tablets, even with a pill cutter.  Only has a few tabs left.  So she would like a refill e-scribed to the [facility] pharmacy d/t those Atenolol tabs are much larger and easier to split in half, with very little wasting of tablets.    Medication refilled x 1 to [facility].    Patient has a future appointment scheduled for medication check.    Next 5 appointments (look out 90 days)    Nov 06, 2019 11:00 AM CST  Office Visit with Leann Sue MD  [facility] [address]  [facility] [address] [phone]

## 2019-11-06 ENCOUNTER — OFFICE VISIT (OUTPATIENT)
Dept: INTERNAL MEDICINE | Facility: CLINIC | Age: 69
End: 2019-11-06
Payer: MEDICARE

## 2019-11-06 ENCOUNTER — TELEPHONE (OUTPATIENT)
Dept: INTERNAL MEDICINE | Facility: CLINIC | Age: 69
End: 2019-11-06

## 2019-11-06 VITALS
HEART RATE: 89 BPM | BODY MASS INDEX: 29.3 KG/M2 | WEIGHT: 171.6 LBS | HEIGHT: 64 IN | SYSTOLIC BLOOD PRESSURE: 136 MMHG | DIASTOLIC BLOOD PRESSURE: 84 MMHG | TEMPERATURE: 97.5 F | OXYGEN SATURATION: 96 % | RESPIRATION RATE: 19 BRPM

## 2019-11-06 DIAGNOSIS — E78.5 HYPERLIPIDEMIA, UNSPECIFIED HYPERLIPIDEMIA TYPE: Primary | ICD-10-CM

## 2019-11-06 DIAGNOSIS — R00.2 PALPITATIONS: ICD-10-CM

## 2019-11-06 DIAGNOSIS — F41.1 GENERALIZED ANXIETY DISORDER: ICD-10-CM

## 2019-11-06 PROCEDURE — 80061 LIPID PANEL: CPT | Performed by: INTERNAL MEDICINE

## 2019-11-06 PROCEDURE — 99214 OFFICE O/P EST MOD 30 MIN: CPT | Performed by: INTERNAL MEDICINE

## 2019-11-06 PROCEDURE — 80053 COMPREHEN METABOLIC PANEL: CPT | Performed by: INTERNAL MEDICINE

## 2019-11-06 PROCEDURE — 36415 COLL VENOUS BLD VENIPUNCTURE: CPT | Performed by: INTERNAL MEDICINE

## 2019-11-06 RX ORDER — HYDROXYZINE HYDROCHLORIDE 25 MG/1
12.5-25 TABLET, FILM COATED ORAL EVERY 12 HOURS PRN
Qty: 30 TABLET | Refills: 0 | Status: SHIPPED | OUTPATIENT
Start: 2019-11-06 | End: 2020-09-10

## 2019-11-06 RX ORDER — ATENOLOL 25 MG/1
12.5 TABLET ORAL DAILY
Qty: 45 TABLET | Refills: 3 | Status: SHIPPED | OUTPATIENT
Start: 2019-11-06 | End: 2020-01-07

## 2019-11-06 ASSESSMENT — MIFFLIN-ST. JEOR: SCORE: 1278.37

## 2019-11-06 ASSESSMENT — PATIENT HEALTH QUESTIONNAIRE - PHQ9
5. POOR APPETITE OR OVEREATING: SEVERAL DAYS
SUM OF ALL RESPONSES TO PHQ QUESTIONS 1-9: 0

## 2019-11-06 ASSESSMENT — ANXIETY QUESTIONNAIRES
6. BECOMING EASILY ANNOYED OR IRRITABLE: NOT AT ALL
5. BEING SO RESTLESS THAT IT IS HARD TO SIT STILL: SEVERAL DAYS
1. FEELING NERVOUS, ANXIOUS, OR ON EDGE: MORE THAN HALF THE DAYS
GAD7 TOTAL SCORE: 9
3. WORRYING TOO MUCH ABOUT DIFFERENT THINGS: MORE THAN HALF THE DAYS
2. NOT BEING ABLE TO STOP OR CONTROL WORRYING: MORE THAN HALF THE DAYS
7. FEELING AFRAID AS IF SOMETHING AWFUL MIGHT HAPPEN: SEVERAL DAYS
IF YOU CHECKED OFF ANY PROBLEMS ON THIS QUESTIONNAIRE, HOW DIFFICULT HAVE THESE PROBLEMS MADE IT FOR YOU TO DO YOUR WORK, TAKE CARE OF THINGS AT HOME, OR GET ALONG WITH OTHER PEOPLE: SOMEWHAT DIFFICULT

## 2019-11-06 NOTE — TELEPHONE ENCOUNTER
Prior Authorization Retail Medication Request    Medication/Dose: Hydroxyzine  ICD code (if different than what is on RX):  Generalized anxiety disorder [F41.1]   Previously Tried and Failed:  unknown  Rationale:  unknown    Insurance Name:  Plan Telephone [phone]  Insurance ID:  [id number]      Pharmacy Information (if different than what is on RX)  Name:  [facility]  Phone:  [phone]

## 2019-11-06 NOTE — PROGRESS NOTES
Subjective     Carola Cox is a 69 year old female who presents to clinic today for the following health issues:    HPI     Hyperlipidemia Follow-Up      Are you having any of the following symptoms? (Select all that apply)  No complaints of shortness of breath, chest pain or pressure.  No increased sweating or nausea with activity.  No left-sided neck or arm pain.  No complaints of pain in calves when walking 1-2 blocks.    Are you regularly taking any medication or supplement to lower your cholesterol?   Yes- simvastatin  Patient would like to stop simvastatin for 3 months and try diet and exercise.    Are you having muscle aches or other side effects that you think could be caused by your cholesterol lowering medication?  No      Anxiety Follow-Up    How are you doing with your anxiety since your last visit?  worsened , patient states that she has tried many antianxiety medications and has not tolerated any and she is requesting to have an as needed medication.    Are you having other symptoms that might be associated with anxiety? Yes:  panic attacks    Have you had a significant life event? No     Are you feeling depressed? No    Do you have any concerns with your use of alcohol or other drugs? No    AIDA-7 SCORE 11/10/2016 11/27/2018 11/6/2019   Total Score 0 8 9          How many servings of fruits and vegetables do you eat daily?  2-3    On average, how many sweetened beverages do you drink each day (soda, juice, sweet tea, etc)?   0    How many days per week do you miss taking your medication? 0         Patient Active Problem List   Diagnosis     CARDIOVASCULAR SCREENING; LDL GOAL LESS THAN 160     Osteoporosis     Generalized anxiety disorder     BPPV (benign paroxysmal positional vertigo)     Hyperlipidemia     Cardiac dysrhythmia     Atrophic vaginitis     H/O wheezing     Past Surgical History:   Procedure Laterality Date     HYSTERECTOMY TOTAL ABDOMINAL, BILATERAL SALPINGO-OOPHORECTOMY, COMBINED   "    Endometriosis.     LITHOTRIPSY         Social History     Tobacco Use     Smoking status: Former Smoker     Last attempt to quit: 1979     Years since quittin.8     Smokeless tobacco: Never Used   Substance Use Topics     Alcohol use: Yes     Alcohol/week: 0.0 standard drinks     Comment: very rare     Family History   Problem Relation Age of Onset     Hyperlipidemia Father      Hypertension Father      Breast Cancer Maternal Grandmother      Heart Disease Other         Family History     Thyroid Disease Sister          Current Outpatient Medications   Medication Sig Dispense Refill     atenolol (TENORMIN) 25 MG tablet Take 0.5 tablets (12.5 mg) by mouth daily 45 tablet 0     Cholecalciferol (VITAMIN D PO)        meclizine (ANTIVERT) 25 MG tablet Take 1 tablet (25 mg) by mouth 3 times daily as needed for dizziness 30 tablet 1     simvastatin (ZOCOR) 20 MG tablet Take 1 tablet (20 mg) by mouth At Bedtime 90 tablet 3     VENTOLIN  (90 Base) MCG/ACT Inhaler INHALE 2 PUFFS INTO THE LUNGS EVERY 6 HOURS 1 Inhaler 3         Reviewed and updated as needed this visit by Provider         Review of Systems   ROS COMP: CONSTITUTIONAL: NEGATIVE for fever, chills, change in weight  EYES: NEGATIVE for vision changes or irritation  ENT/MOUTH: NEGATIVE for ear, mouth and throat problems  RESP: NEGATIVE for significant cough or SOB  CV: NEGATIVE for chest pain, palpitations or peripheral edema  MUSCULOSKELETAL: NEGATIVE for significant arthralgias or myalgia  NEURO: NEGATIVE for weakness, dizziness or paresthesias  Psych; anxiety   ROS otherwise negative        Objective    /84   Pulse 89   Temp 97.5  F (36.4  C) (Oral)   Resp 19   Ht 1.626 m (5' 4\")   Wt 77.8 kg (171 lb 9.6 oz)   SpO2 96%   BMI 29.46 kg/m    Body mass index is 29.46 kg/m .  Physical Exam   GENERAL: healthy, alert and no distress  EYES: Eyes grossly normal to inspection, PERRL and conjunctivae and sclerae normal  NECK: no adenopathy, " "no asymmetry, masses, or scars and thyroid normal to palpation  RESP: lungs clear to auscultation - no rales, rhonchi or wheezes  CV: regular rate and rhythm,   MS: no gross musculoskeletal defects noted, no edema  NEURO: Normal strength and tone, mentation intact and speech normal         Assessment & Plan     (E78.5) Hyperlipidemia, unspecified hyperlipidemia type   Plan: pt will be stopping simvastatin , ok for trial of  low fat diet and exercise and rpt lipids in 3 months   Comprehensive metabolic panel, Lipid panel         reflex to direct LDL Non-fasting, CANCELED:         Lipid panel reflex to direct LDL Fasting            (R00.2) Palpitations  Plan: on atenolol (TENORMIN) 25 MG tablet- 1/2 tab daily.explained clearly about the medication,insructions and side effects.       (F41.1) Generalized anxiety disorder  Plan: pt does not want to take any serotonin specific reuptake inhibitor/SNRI or any daily meds for anxiety . started on hydrOXYzine (ATARAX) 25 MG tablet- 1/2 -1 tab prn anxiety as directed.explained clearly about the medication,insructions and side effects.   Advised not to drive or operate any machinery while on this med  Call or return to clinic prn if these symtoms worsen, fail to improve as anticipated, or if new symptoms develop.              BMI:   Estimated body mass index is 29.46 kg/m  as calculated from the following:    Height as of this encounter: 1.626 m (5' 4\").    Weight as of this encounter: 77.8 kg (171 lb 9.6 oz).        FUTURE APPOINTMENTS:       - Follow-up labs in 3 month        Leann Sue MD  [facility]        "

## 2019-11-06 NOTE — NURSING NOTE
"/84   Pulse 89   Temp 97.5  F (36.4  C) (Oral)   Resp 19   Ht 1.626 m (5' 4\")   Wt 77.8 kg (171 lb 9.6 oz)   SpO2 96%   BMI 29.46 kg/m    Patient in for follow up on lipids and anxiety.  Lyndsey Arredondo, KHANH    "

## 2019-11-07 LAB
ALBUMIN SERPL-MCNC: 3.9 G/DL (ref 3.4–5)
ALP SERPL-CCNC: 67 U/L (ref 40–150)
ALT SERPL W P-5'-P-CCNC: 35 U/L (ref 0–50)
ANION GAP SERPL CALCULATED.3IONS-SCNC: 7 MMOL/L (ref 3–14)
AST SERPL W P-5'-P-CCNC: 26 U/L (ref 0–45)
BILIRUB SERPL-MCNC: 0.4 MG/DL (ref 0.2–1.3)
BUN SERPL-MCNC: 10 MG/DL (ref 7–30)
CALCIUM SERPL-MCNC: 9.6 MG/DL (ref 8.5–10.1)
CHLORIDE SERPL-SCNC: 107 MMOL/L (ref 94–109)
CHOLEST SERPL-MCNC: 183 MG/DL
CO2 SERPL-SCNC: 26 MMOL/L (ref 20–32)
CREAT SERPL-MCNC: 0.75 MG/DL (ref 0.52–1.04)
GFR SERPL CREATININE-BSD FRML MDRD: 79 ML/MIN/{1.73_M2}
GLUCOSE SERPL-MCNC: 87 MG/DL (ref 70–99)
HDLC SERPL-MCNC: 60 MG/DL
LDLC SERPL CALC-MCNC: 100 MG/DL
NONHDLC SERPL-MCNC: 123 MG/DL
POTASSIUM SERPL-SCNC: 4 MMOL/L (ref 3.4–5.3)
PROT SERPL-MCNC: 7.3 G/DL (ref 6.8–8.8)
SODIUM SERPL-SCNC: 140 MMOL/L (ref 133–144)
TRIGL SERPL-MCNC: 117 MG/DL

## 2019-11-07 ASSESSMENT — ANXIETY QUESTIONNAIRES: GAD7 TOTAL SCORE: 9

## 2019-11-08 NOTE — TELEPHONE ENCOUNTER
Central Prior Authorization Team   Phone: [phone]    PA Initiation    Medication: Hydroxyzine  Insurance Company: [employer] - Phone [phone] Fax [phone]  Pharmacy Filling the Rx: [id number] - [place], [place] - [address] [address].  Filling Pharmacy Phone: [phone]  Filling Pharmacy Fax: [phone]  Start Date: 11/8/2019

## 2019-11-08 NOTE — TELEPHONE ENCOUNTER
Prior Authorization Approval    Authorization Effective Date: 8/10/2019  Authorization Expiration Date: 11/7/2020  Medication: Hydroxyzine-APPROVED  Approved Dose/Quantity:    Reference #:     Insurance Company: [employer] - Phone [phone] Fax [phone]  Expected CoPay:       CoPay Card Available:      Foundation Assistance Needed:    Which Pharmacy is filling the prescription (Not needed for infusion/clinic administered): [id number] [place], [place] - [address] [address] [facility].  Pharmacy Notified: Yes  Patient Notified: Yes  **Instructed pharmacy to notify patient when script is ready to /ship.**

## 2019-11-11 ENCOUNTER — VIRTUAL VISIT (OUTPATIENT)
Dept: FAMILY MEDICINE | Facility: OTHER | Age: 69
End: 2019-11-11

## 2019-11-11 NOTE — PROGRESS NOTES
"Date: 2019 09:22:05  Clinician: Renato Morrow  Clinician NPI: [id number]  Patient: srikanth martinez  Patient : 1950  Patient Address: [address] [id number]  Patient Phone: [phone]  Visit Protocol: URI  Patient Summary:  srikanth is a 69 year old ( : 1950 ) female who initiated a Visit for cold, sinus infection, or influenza. When asked the question \"Please sign me up to receive news, health information and promotions. \", srikanth responded \"No\".    srikanth states her symptoms started suddenly 3-6 days ago.   Her symptoms consist of a headache, a sore throat, malaise, rhinitis, tooth pain, a cough, facial pain or pressure, and enlarged lymph nodes. srikanth also feels feverish.   Symptom details     Nasal secretions: The color of her mucus is clear and white.    Cough: srikanth coughs every 5-10 minutes and her cough is not more bothersome at night. Phlegm does not come into her throat when she coughs.     Sore throat: srikanth reports having moderate throat pain (4-6 on a 10 point pain scale), does not have exudate on her tonsils, and can swallow liquids. The lymph nodes in her neck are enlarged. A rash has not appeared on the skin since the sore throat started.     Temperature: Her current temperature is 100.1 degrees Fahrenheit. srikanth has had a temperature over 100 degrees Fahrenheit for 5-7 days.     Facial pain or pressure: The facial pain or pressure feels worse when bending over or leaning forward.     Headache: She states the headache is mild (1-3 on a 10 point pain scale).     Tooth pain: The tooth pain is not caused by a cavity, recent dental work, or other mouth problems.      srikanth denies having wheezing, myalgias, chills, nasal congestion, and ear pain. She also denies having recent facial or sinus surgery in the past 60 days, having a sinus infection within the past year, double sickening (worsening symptoms after initial improvement), and taking " antibiotic medication for the symptoms. She is not experiencing dyspnea.   Precipitating events  Within the past week, srikanth has not been exposed to someone with strep throat. She has not recently been exposed to someone with influenza. srikanth has not been in close contact with any high risk individuals.   Pertinent medical history  srikanth does not get yeast infections when she takes antibiotics.   Weight: 166 lbs   srikanth does not smoke or use smokeless tobacco.     MEDICATIONS: atenolol oral, ALLERGIES: Penicillins  Clinician Response:  Dear srikanth,  Based on the information provided, you have viral sinusitis, also known as a sinus infection. Sinus infections are caused by bacteria or a virus and symptoms are almost always identical. The difference between the 2 types of infections is timing.  Sinus infections start as viral infections and symptoms improve on their own in about 7 days. If symptoms have not improved after 7 days or have even worsened, a bacterial infection may have developed.  Medication information  Because you have a viral infection, antibiotics will not help you get better. Treating a viral infection with antibiotics could actually make you feel worse.  For more information on why I am not prescribing antibiotics, please watch this video: Antibiotics Aren't Always the Answer.  I am prescribing:       Fluticasone 50 mcg/actuation nasal spray. Inhale 2 sprays in each nostril 1 time per day; after 1 week, may adjust to 1 - 2 sprays in each nostril 1 time per day. This medication takes several days to start working, so keep taking it even if it doesn't help right away. There are no refills with this prescription.      Benzonatate (Tessalon Perles) 100 mg oral capsule. Take 1-2 capsules by mouth 3 times per day as needed for your cough. There are no refills with this prescription.     Self care  The following tips will keep you as comfortable as possible while you recover:     Rest    Drink  plenty of water and other liquids    Take a hot shower to loosen congestion    Use throat lozenges    Gargle with warm salt water (1/4 teaspoon of salt per 8 ounce glass of water)    Suck on frozen items such as popsicles or ice cubes    Drink hot tea with lemon and honey    Take a spoonful of honey to reduce your cough     When to seek care  Please be seen in a clinic or urgent care if new symptoms develop, or symptoms become worse.  Call 911 or go to the emergency room if you feel that your throat is closing off, you suddenly develop a rash, you are unable to swallow fluids, you are drooling, or you are having difficulty breathing.   Diagnosis: Viral sinusitis  Diagnosis ICD: J01.90  Prescription: benzonatate (Tessalon Perles) 100 mg oral capsule 30 capsule, 5 days supply. Take 1-2 capsules by mouth 3 times per day as needed. Refills: 0, Refill as needed: no, Allow substitutions: yes  Prescription: fluticasone 50 mcg/actuation nasal spray,suspension 1 120 spray aerosol with adapter (grams), 30 days supply. Inhale 2 sprays in each nostril 1 time per day; after 1 week, may adjust to 1 - 2 sprays in each nostril 1 time per day.. Refills: 0, Refill as needed: no, Allow substitutions: yes

## 2019-11-12 ENCOUNTER — OFFICE VISIT (OUTPATIENT)
Dept: INTERNAL MEDICINE | Facility: CLINIC | Age: 69
End: 2019-11-12
Payer: MEDICARE

## 2019-11-12 VITALS
BODY MASS INDEX: 29.44 KG/M2 | DIASTOLIC BLOOD PRESSURE: 70 MMHG | RESPIRATION RATE: 16 BRPM | HEART RATE: 103 BPM | OXYGEN SATURATION: 97 % | WEIGHT: 171.5 LBS | SYSTOLIC BLOOD PRESSURE: 130 MMHG | TEMPERATURE: 98.5 F

## 2019-11-12 DIAGNOSIS — J01.01 ACUTE RECURRENT MAXILLARY SINUSITIS: Primary | ICD-10-CM

## 2019-11-12 PROCEDURE — 99213 OFFICE O/P EST LOW 20 MIN: CPT | Performed by: PHYSICIAN ASSISTANT

## 2019-11-12 RX ORDER — DOXYCYCLINE 100 MG/1
100 TABLET ORAL 2 TIMES DAILY
Qty: 14 TABLET | Refills: 0 | Status: SHIPPED | OUTPATIENT
Start: 2019-11-12 | End: 2020-01-07

## 2019-11-12 NOTE — PROGRESS NOTES
Subjective     Carola Cox is a 69 year old female who presents to clinic today for the following health issues:    HPI   RESPIRATORY SYMPTOMS      Duration: x7 days    Description  nasal congestion, facial pain/pressure, cough, fever, headache, fatigue/malaise, hoarse voice, myalgias and feels like glands are swollen  Cold symptoms started at first, symptoms improving then fever returned with sinus pain and swollen glands     Severity: moderate    Accompanying signs and symptoms: None    History (predisposing factors):  none    Precipitating or alleviating factors: None    Therapies tried and outcome:  Advil, mucinex with little to know relief    Reviewed and updated as needed this visit by Provider  Meds  Problems             Objective    /70 (BP Location: Right arm, Patient Position: Chair, Cuff Size: Adult Regular)   Pulse 103   Temp 98.5  F (36.9  C) (Oral)   Resp 16   Wt 77.8 kg (171 lb 8 oz)   SpO2 97%   BMI 29.44 kg/m    Body mass index is 29.44 kg/m .  Physical Exam   GENERAL: healthy, alert and no distress  EYES: Eyes grossly normal to inspection, PERRL and conjunctivae and sclerae normal  HENT: normal cephalic/atraumatic, ear canals and TM's normal, nose and mouth without ulcers or lesions, oropharynx clear, oral mucous membranes moist and sinuses: maxillary tenderness on right  NECK: bilateral anterior cervical adenopathy  RESP: lungs clear to auscultation - no rales, rhonchi or wheezes  CV: regular rates and rhythm, normal S1 S2, no S3 or S4 and no murmur, click or rub    Diagnostic Test Results:  Labs reviewed in Epic        Assessment & Plan     1. Acute recurrent maxillary sinusitis  - suspect bacterial sinusitis based on cold symptoms that improved than worsened  - treatment as below, follow up if symptoms worsen or fail to improve   - doxycycline monohydrate (ADOXA) 100 MG tablet; Take 1 tablet (100 mg) by mouth 2 times daily for 7 days  Dispense: 14 tablet; Refill: 0     No  follow-ups on file.    Massiel Higgins PA-C  [facility]

## 2019-11-14 ENCOUNTER — MYC MEDICAL ADVICE (OUTPATIENT)
Dept: INTERNAL MEDICINE | Facility: CLINIC | Age: 69
End: 2019-11-14

## 2019-11-14 DIAGNOSIS — J01.01 ACUTE RECURRENT MAXILLARY SINUSITIS: Primary | ICD-10-CM

## 2019-11-14 RX ORDER — AZITHROMYCIN 250 MG/1
TABLET, FILM COATED ORAL
Qty: 6 TABLET | Refills: 0 | Status: SHIPPED | OUTPATIENT
Start: 2019-11-14 | End: 2020-01-07

## 2019-11-14 NOTE — TELEPHONE ENCOUNTER
Azithromycin is not indicated for sinus infection based on current Up to Date guidelines and thus I would not recommend it. Is she taking antibiotic with food? She can try a Z pack if absolutely unable to tolerate doxycycline, but it is possible that this would not cure her sinus infection.

## 2019-11-14 NOTE — TELEPHONE ENCOUNTER
Per MyChart message patient having GI upset with Doxycycline abx for recently treated sinus infection.     Patient wanting to know if she can switch to Zpak?    Leticia LOPES RN, BSN, PHN

## 2019-11-14 NOTE — TELEPHONE ENCOUNTER
Please notify patient to stop Doxy. Z-pack sent in. Follow up if symptoms worsen or fail to improve.

## 2019-12-04 ENCOUNTER — HOSPITAL ENCOUNTER (OUTPATIENT)
Dept: MAMMOGRAPHY | Facility: CLINIC | Age: 69
Discharge: HOME | End: 2019-12-04
Attending: INTERNAL MEDICINE | Admitting: INTERNAL MEDICINE
Payer: MEDICARE

## 2019-12-04 DIAGNOSIS — Z12.31 VISIT FOR SCREENING MAMMOGRAM: ICD-10-CM

## 2019-12-04 PROCEDURE — 77063 BREAST TOMOSYNTHESIS BI: CPT

## 2019-12-15 ENCOUNTER — HEALTH MAINTENANCE LETTER (OUTPATIENT)
Age: 69
End: 2019-12-15

## 2019-12-19 ENCOUNTER — TELEPHONE (OUTPATIENT)
Dept: INTERNAL MEDICINE | Facility: CLINIC | Age: 69
End: 2019-12-19

## 2019-12-19 ENCOUNTER — ANCILLARY PROCEDURE (OUTPATIENT)
Dept: BONE DENSITY | Facility: CLINIC | Age: 69
End: 2019-12-19
Payer: MEDICARE

## 2019-12-19 DIAGNOSIS — M81.0 OSTEOPOROSIS, UNSPECIFIED OSTEOPOROSIS TYPE, UNSPECIFIED PATHOLOGICAL FRACTURE PRESENCE: ICD-10-CM

## 2019-12-19 DIAGNOSIS — M81.0 OSTEOPOROSIS, UNSPECIFIED OSTEOPOROSIS TYPE, UNSPECIFIED PATHOLOGICAL FRACTURE PRESENCE: Primary | ICD-10-CM

## 2019-12-19 PROCEDURE — 77085 DXA BONE DENSITY AXL VRT FX: CPT | Performed by: INTERNAL MEDICINE

## 2020-01-07 ENCOUNTER — ALLIED HEALTH/NURSE VISIT (OUTPATIENT)
Dept: PHARMACY | Facility: CLINIC | Age: 70
End: 2020-01-07
Payer: COMMERCIAL

## 2020-01-07 DIAGNOSIS — H81.13 BENIGN PAROXYSMAL POSITIONAL VERTIGO DUE TO BILATERAL VESTIBULAR DISORDER: ICD-10-CM

## 2020-01-07 DIAGNOSIS — K21.00 GASTROESOPHAGEAL REFLUX DISEASE WITH ESOPHAGITIS: ICD-10-CM

## 2020-01-07 DIAGNOSIS — I49.8 OTHER CARDIAC ARRHYTHMIA: Primary | ICD-10-CM

## 2020-01-07 DIAGNOSIS — F41.1 GENERALIZED ANXIETY DISORDER: ICD-10-CM

## 2020-01-07 DIAGNOSIS — E78.5 HYPERLIPIDEMIA, UNSPECIFIED HYPERLIPIDEMIA TYPE: ICD-10-CM

## 2020-01-07 PROCEDURE — 99605 MTMS BY PHARM NP 15 MIN: CPT | Performed by: PHARMACIST

## 2020-01-07 PROCEDURE — 99607 MTMS BY PHARM ADDL 15 MIN: CPT | Performed by: PHARMACIST

## 2020-01-07 RX ORDER — PROPRANOLOL HYDROCHLORIDE 10 MG/1
5 TABLET ORAL 2 TIMES DAILY PRN
COMMUNITY
End: 2020-01-28

## 2020-01-07 RX ORDER — FAMOTIDINE 20 MG/1
20 TABLET, FILM COATED ORAL
COMMUNITY
End: 2024-09-09

## 2020-01-07 ASSESSMENT — ANXIETY QUESTIONNAIRES
3. WORRYING TOO MUCH ABOUT DIFFERENT THINGS: NEARLY EVERY DAY
6. BECOMING EASILY ANNOYED OR IRRITABLE: NOT AT ALL
7. FEELING AFRAID AS IF SOMETHING AWFUL MIGHT HAPPEN: MORE THAN HALF THE DAYS
1. FEELING NERVOUS, ANXIOUS, OR ON EDGE: NEARLY EVERY DAY
IF YOU CHECKED OFF ANY PROBLEMS ON THIS QUESTIONNAIRE, HOW DIFFICULT HAVE THESE PROBLEMS MADE IT FOR YOU TO DO YOUR WORK, TAKE CARE OF THINGS AT HOME, OR GET ALONG WITH OTHER PEOPLE: SOMEWHAT DIFFICULT
GAD7 TOTAL SCORE: 13
2. NOT BEING ABLE TO STOP OR CONTROL WORRYING: NEARLY EVERY DAY
5. BEING SO RESTLESS THAT IT IS HARD TO SIT STILL: MORE THAN HALF THE DAYS

## 2020-01-07 ASSESSMENT — PATIENT HEALTH QUESTIONNAIRE - PHQ9: 5. POOR APPETITE OR OVEREATING: NOT AT ALL

## 2020-01-07 NOTE — PATIENT INSTRUCTIONS
Recommendations from today's MTM visit:                                                    MTM (medication therapy management) is a service provided by a clinical pharmacist designed to help you get the most of out of your medicines.   Today we reviewed what your medicines are for, how to know if they are working, that your medicines are safe and how to make your medicine regimen as easy as possible.     1.  Try propanolol 5 mg at bedtime, if needed you can increase to 5 mg twice daily.    2.  I will see if visits with [facility] would be covered for you otherwise we can get you other resources to help with the anxiety.    3.  I will look into genetic testing for you.    It was great to speak with you today.  I value your experience and would be very thankful for your time with providing feedback on our clinic survey. You may receive a survey via email or text message in the next few days.     Next MTM visit: 2 weeks    To schedule another MTM appointment, please call the clinic directly or you may call the MTM scheduling line at [phone] or toll-free at [phone].     My Clinical Pharmacist's contact information:                                                      It was a pleasure talking with you today!  Please feel free to contact me with any questions or concerns you have.      Arely Vidal , Pharm D  [phone] (phone)  [phone] (pager)  Medication Therapy Management Pharmacist

## 2020-01-07 NOTE — PROGRESS NOTES
SUBJECTIVE/OBJECTIVE:                           Carola Cox is a 69 year old female coming in for an initial visit for Medication Therapy Management.  She was referred to me from Dr. Sue.    Chief Complaint: Sensitivity to medications, open to anything to help her feel better but does not like medications    Allergies/ADRs: Reviewed in Epic.  Very sensitive to medications, only can tolerate really low doses.  Tobacco:  reports that she quit smoking about 41 years ago. She has never used smokeless tobacco.  Alcohol: not currently using  PMH: Reviewed in Epic    Medication Adherence/Access:  Admits to playing doctor and trying changes with her medications on her own.    SVT: Taking atenolol 12.5 mg daily (started in Sept; last dose last Friday night and then stopped on her own).  Took at night and the next day would feel horrible and more anxious the following day.  Was on this previously years ago and tolerated it better.  SVT restarted a while ago without her realizing it.  She tried a higher dose on her own but that really 'knocked' her out.  Checked her pulse this morning and it was 105-130.    Last saw Cardiology about 10 years ago.    BP Readings from Last 3 Encounters:   11/12/19 130/70   11/06/19 136/84   08/01/19 136/84       Hyperlipidemia: Current therapy includes simvastatin 20 mg once daily.  Was planning to stop but decided to continue since she wants to change her other medications.  Pt reports no significant myalgias or other side effects.     Recent Labs   Lab Test 11/06/19  1123 11/23/18  0909  10/28/15  0937   CHOL 183 158   < > 152   HDL 60 60   < > 55   * 80   < > 76   TRIG 117 90   < > 104   CHOLHDLRATIO  --   --   --  2.8    < > = values in this interval not displayed.       Anxiety: Taking hydroxyzine 12.5-25 mg as needed.  Take 1/2 pill in the afternoon that made her sleepy so afraid to take during the day but it did help.  Has had AIDA since she was a young adult.  Feels  like she is in a constant panic attack mood.  Has used propranolol as needed in the past; was okay and tolerated it.   Doesn't have a hard time sleeping.    She does practice meditation and centering prayer every day.  Unsure if her anxiety is causing her symptoms of palpitations or she is more anxious because of the palpitations.  AIDA-7 SCORE 11/27/2018 11/6/2019 1/7/2020   Total Score 8 9 13       Positional Vertigo: Tried meclizine once.  Has seen therapist to learn exercises.  Still doing them which are helpful.  Will have symptoms randomly.    GERD: Current medications include: Pepcid (famotidine) 10 mg once daily. Pt c/o no current symptoms.  Patient feels that current regimen is effective.    Today's Vitals: There were no vitals taken for this visit.      ASSESSMENT:                              Medication Adherence: fair, needs improvement - see below    SVT: untreated.  Discussed a trial of another select beta-blocker such as metoprolol.  She would prefer another trial of propranolol, see below.  Will start low dose and increase as tolerated throughout the day.    Hyperlipidemia: stable    Anxiety: unimproved.  Patient prefers to retry propranolol and open to counseling.  Will see if visits with [facility] would be covered for her other will refer to [facility].  She is interested in completing [facility] due to her history of medication sensitivities.    Positional Vertigo: stable    GERD: stable    PLAN:                            1.  Try propanolol 5 mg at bedtime, if needed and tolerated you can increase to 5 mg twice daily.    2.  I will see if visits with [facility] would be covered for you otherwise we can get you other resources to help with the anxiety.    3.  Genetic testing    I spent 40 minutes with this patient today. All changes were made via collaborative practice agreement with PCP. A copy of the visit note was provided to the patient's primary care provider.    Will follow up in 2  weeks.    The patient was given a summary of these recommendations as an after visit summary.     Kimberly Hernandez  [phone] (phone)  [phone] (pager)  Medication Therapy Management Pharmacist

## 2020-01-08 ASSESSMENT — ANXIETY QUESTIONNAIRES: GAD7 TOTAL SCORE: 13

## 2020-01-09 ENCOUNTER — TELEPHONE (OUTPATIENT)
Dept: INTERNAL MEDICINE | Facility: CLINIC | Age: 70
End: 2020-01-09

## 2020-01-09 DIAGNOSIS — F41.1 GENERALIZED ANXIETY DISORDER: Primary | ICD-10-CM

## 2020-01-09 NOTE — TELEPHONE ENCOUNTER
----- Message from Arely Vidal [facility] sent at 1/7/2020 11:34 AM CST -----  Regarding: [facility] Referral  Hi Dr. Vikram Arriaga is interesting in meeting with a counselor to work on her anxiety. She is not covered to see Kat[facility].  Would you be willing to put in a referral for [facility] for her?    Thank you    Arely Vidal , Pharm D  [phone] (phone)  [phone] (pager)  Medication Therapy Management Pharmacist

## 2020-01-29 DIAGNOSIS — E78.2 MIXED HYPERLIPIDEMIA: ICD-10-CM

## 2020-01-29 NOTE — TELEPHONE ENCOUNTER
"Requested Prescriptions   Pending Prescriptions Disp Refills     simvastatin (ZOCOR) 20 MG tablet [Pharmacy Med Name: SIMVASTATIN 20 MG TABLET] 90 tablet 0     Sig: TAKE 1 TABLET BY MOUTH AT BEDTIME   Last Written Prescription Date:  11/27/2018  Last Fill Quantity: 90,  # refills: 03   Last office visit: 11/12/2019 with prescribing provider:     Future Office Visit:   Next 5 appointments (look out 90 days)    Feb 21, 2020 11:30 AM CST  Return Visit with ALICJA Burden  [facility] [address] [facility] [address] [phone]           Statins Protocol Passed - 1/29/2020  1:27 PM        Passed - LDL on file in past 12 months     Recent Labs   Lab Test 11/06/19  1123   *             Passed - No abnormal creatine kinase in past 12 months     No lab results found.             Passed - Recent (12 mo) or future (30 days) visit within the authorizing provider's specialty     Patient has had an office visit with the authorizing provider or a provider within the authorizing providers department within the previous 12 mos or has a future within next 30 days. See \"Patient Info\" tab in inbasket, or \"Choose Columns\" in Meds & Orders section of the refill encounter.              Passed - Medication is active on med list        Passed - Patient is age 18 or older        Passed - No active pregnancy on record        Passed - No positive pregnancy test in past 12 months        "

## 2020-01-30 RX ORDER — SIMVASTATIN 20 MG
TABLET ORAL
Qty: 90 TABLET | Refills: 2 | Status: SHIPPED | OUTPATIENT
Start: 2020-01-30 | End: 2020-10-29

## 2020-02-14 ENCOUNTER — OFFICE VISIT (OUTPATIENT)
Dept: PSYCHOLOGY | Facility: CLINIC | Age: 70
End: 2020-02-14
Payer: MEDICARE

## 2020-02-14 DIAGNOSIS — F41.0 PANIC DISORDER WITHOUT AGORAPHOBIA: Primary | ICD-10-CM

## 2020-02-14 PROCEDURE — 90791 PSYCH DIAGNOSTIC EVALUATION: CPT | Performed by: SOCIAL WORKER

## 2020-02-14 ASSESSMENT — ANXIETY QUESTIONNAIRES
2. NOT BEING ABLE TO STOP OR CONTROL WORRYING: NEARLY EVERY DAY
3. WORRYING TOO MUCH ABOUT DIFFERENT THINGS: SEVERAL DAYS
6. BECOMING EASILY ANNOYED OR IRRITABLE: NOT AT ALL
5. BEING SO RESTLESS THAT IT IS HARD TO SIT STILL: NOT AT ALL
7. FEELING AFRAID AS IF SOMETHING AWFUL MIGHT HAPPEN: NEARLY EVERY DAY
GAD7 TOTAL SCORE: 11
1. FEELING NERVOUS, ANXIOUS, OR ON EDGE: NEARLY EVERY DAY
4. TROUBLE RELAXING: SEVERAL DAYS

## 2020-02-14 ASSESSMENT — COLUMBIA-SUICIDE SEVERITY RATING SCALE - C-SSRS
ATTEMPT LIFETIME: NO
TOTAL  NUMBER OF INTERRUPTED ATTEMPTS PAST 3 MONTHS: NO
TOTAL  NUMBER OF ABORTED OR SELF INTERRUPTED ATTEMPTS PAST 3 MONTHS: NO
1. IN THE PAST MONTH, HAVE YOU WISHED YOU WERE DEAD OR WISHED YOU COULD GO TO SLEEP AND NOT WAKE UP?: NO
6. HAVE YOU EVER DONE ANYTHING, STARTED TO DO ANYTHING, OR PREPARED TO DO ANYTHING TO END YOUR LIFE?: NO
2. HAVE YOU ACTUALLY HAD ANY THOUGHTS OF KILLING YOURSELF?: NO
6. HAVE YOU EVER DONE ANYTHING, STARTED TO DO ANYTHING, OR PREPARED TO DO ANYTHING TO END YOUR LIFE?: NO
TOTAL  NUMBER OF ABORTED OR SELF INTERRUPTED ATTEMPTS PAST LIFETIME: NO
ATTEMPT PAST THREE MONTHS: NO
1. IN THE PAST MONTH, HAVE YOU WISHED YOU WERE DEAD OR WISHED YOU COULD GO TO SLEEP AND NOT WAKE UP?: NO
2. HAVE YOU ACTUALLY HAD ANY THOUGHTS OF KILLING YOURSELF LIFETIME?: NO
TOTAL  NUMBER OF INTERRUPTED ATTEMPTS LIFETIME: NO

## 2020-02-14 ASSESSMENT — PATIENT HEALTH QUESTIONNAIRE - PHQ9: SUM OF ALL RESPONSES TO PHQ QUESTIONS 1-9: 2

## 2020-02-14 NOTE — Clinical Note
I met with client today and completed a diagnostic assessment.  I gave her a panic disorder diagnosis and will continue to assess for AIDA.

## 2020-02-14 NOTE — PROGRESS NOTES
"                                                                                                                                                                        Adult Intake Structured Interview  Standard Diagnostic Assessment      CLIENT'S NAME: Carola Cox  MRN:   [id number]  :   1950  ACCT. NUMBER: [id number]  DATE OF SERVICE: 20  VIDEO VISIT: No    Identifying Information:  Client is a 69 year old, ,  female. Client was referred for counseling by primary care provider. Client is currently  employed part time and reports she is able to function appropriately at work.. Client attended the session alone.       Client's Statement of Presenting Concern:  Client reports the reason for seeking therapy at this time as anxiety.  Client reported history of irregular heart rate and panic symptoms and uncertainty which she is experiencing.  She reported anxiety related to experiencing panic symptoms.  Client stated that her symptoms have resulted in the following functional impairments: client reported she can experience anxiety about going places for fear of experiencing physical symptoms.  She reported she does not avoid doing what she needs to do, rather \"fights\" her way through it.      History of Presenting Concern:  Client reports she has experienced anxiety off and on throughout her adulthood.  She reported that these problem(s) began again summer 2019. Client has attempted to resolve these concerns in the past through medication. Client reports that other professional(s) are not involved in providing support / services. She is established with PCP.      Social History:  Client reported she grew up in [place], [place]. They were the first born of four children. Parents remained  until her mom's death in . Client reported that her childhood was \"younger years were normal\". She explained that her mom was \"sick\" when she was a teenager, reported to be due to mental " "health.  She reported that her mom would lay on the couch all day, would fear being alone, and had ECT.  At that time client reported she \"became mom\" to her siblings.  Client described her current relationships with family of origin as \"get along well\".  She reported still feeling her siblings view her as a parent versus a sibling.    Client reported a history of 1 committed relationships or marriages. Client has been  for 9 years. She reported having a \"great\" relationship with her .  Client reported having 2 children and 6 grandchildren. Client identified extensive stable and meaningful social connections. Client reported that she has not been involved with the legal system.  Client's highest education level was high school graduate. Client did not identify any learning problems. There are no ethnic, cultural or Confucianist factors that may be relevant for therapy. Client identified her preferred language to be English. Client reported she does not need the assistance of an  or other support involved in therapy. Modifications will not be used to assist communication in therapy. Client did not serve in the .     Client reports family history includes Breast Cancer in her maternal grandmother; Heart Disease in an other family member; Hyperlipidemia in her father; Hypertension in her father; Thyroid Disease in her sister.    Mental Health History:  Client reported the following biological family members or relatives with mental health issues: Mother experienced Anxiety and Depression, Brother experienced Anxiety and Depression and Sister experienced Anxiety, Bipolar Disorder and Depression.  Client previously received the following mental health diagnosis: Anxiety.  Client has received the following mental health services in the past: medication(s) from physician / PCP.   She reported a history of attending grief and long transplant support groups. Hospitalizations: None.  Client is " not currently receiving any mental health services.    Chemical Health History:  Client reported no family history of chemical health issues. Client has not received chemical dependency treatment in the past. Client is not currently receiving any chemical dependency treatment. Client reports no problems as a result of their drinking / drug use.    Client Reports:  Client denies using alcohol.  Client denies using tobacco.  Client denies using marijuana.  Client denies using caffeine.  Client denies using street drugs.  Client denies the non-medical use of prescription or over the counter drugs.    CAGE: None of the patient's responses to the CAGE screening were positive / Negative CAGE score   Based on the negative Cage-Aid score and clinical interview there  are not indications of drug or alcohol abuse.      Significant Losses / Trauma / Abuse / Neglect Issues:  There are indications or report of significant loss, trauma, abuse or neglect issues related to: death of parents and .    Issues of possible neglect are not present.      Medical Issues:  Client has had a physical exam to rule out medical causes for current symptoms. Date of last physical exam was within the past year. Client was encouraged to follow up with PCP if symptoms were to develop. The client has a [facility] Provider, who is named Leann Sue. The client reports not having a psychiatrist. Client reports no current medical concerns. The client denies the presence of chronic or episodic pain. There are not significant nutritional concerns.     Patient reports not taking any current medications   She reported she stopped taking her medication two weeks ago.    Client Allergies:  Allergies   Allergen Reactions     Cefprozil Hives     Cefzil     Penicillin V        Medical History:  Past Medical History:   Diagnosis Date     Atrophic vaginitis      BPPV (benign paroxysmal positional vertigo)      Cardiac dysrhythmia      beta blocker for this in past     Endometriosis of pelvic peritoneum      Generalized anxiety disorder 2014    cannot tolerate SSRIs. Trial of 12.5mg sertraline and had severe nausea, diarrhea, dizziness. low dose propranolol prn and counseling      History of postmenopausal HRT      Hyperlipidemia      Osteoporosis          Medication Adherence:  N/A - Client does not have prescribed psychiatric medications.  Client reported she stopped taking her medication two weeks ago.    Mental Status Assessment:  Appearance:   Appropriate   Eye Contact:   Good   Psychomotor Behavior: Normal   Attitude:   Cooperative   Orientation:   All  Speech   Rate / Production: Normal    Volume:  Normal   Mood:    Sad -minimal, overall, appropriate  Affect:    tearful-minimal   Thought Content:  Clear   Thought Form:  Coherent  Logical   Insight:    Good       Review of Symptoms:  Depression: Sleep Energy  Henna:  No symptoms  Psychosis: No symptoms  Anxiety: Worries Nervousness feeling afraid as if something awful will happen  Panic:  Palpitations Sense of Impending Doom  Post Traumatic Stress Disorder: No symptoms  Obsessive Compulsive Disorder: No symptoms  Eating Disorder: No symptoms  Oppositional Defiant Disorder: No symptoms  ADD / ADHD: No symptoms  Conduct Disorder: No symptoms      Safety Assessment:    History of Safety Concerns:   Client denied a history of suicidal ideation.    Client denied a history of suicide attempts.    Client denied a history of homicidal ideation.    Client denied a history of self-injurious ideation and behaviors.    Client denied a history of personal safety concerns.    Client denied a history of assaultive behaviors.        Current Safety Concerns:  Client denies current suicidal ideation.    Client denies current homicidal ideation and behaviors.  Client denies current self-injurious ideation and behaviors.    Client denies current concerns for personal safety.    Client reports the following  "protective factors: positive relationships positive social network and positive family connections and Taoism    Client reports there are no firearms in the house.     Plan for Safety and Risk Management:  Recommended that patient call 911 or go to the local ED should there be a change in any of these risk factors.    Client's Strengths and Limitations:  Client identified the following strengths or resources that will help her succeed in counseling: Taoism / Alevism, friends / good social support and family support. Client identified the following supports: family, Orthodoxy / spirituality and friends. Things that may interfere with the client's success in counseling include: unable to identify.      Diagnostic Criteria:  1. Recurrent unexpected panic attacks and meets criteria 2, 3, and 4 (below)  2. At least one of the attacks has been followed by 1 month (or more) of one (or more) of the following:     (a) persistent concern about having additional attacks     (b) worry about the implications of the attack or its consequences  3. Absence of agoraphobia  4. The panic attacks are not to the the direct physiological effects of a substance or general medical condition  5. The panic attacks are not better accounted for by another mental disorder, such as social phobia, specific phobia, OCD, PTSD, or separation anxiety disorder      Functional Status:  Client's symptoms are causing reduced functional status in the following areas: client reported she can experience anxiety about going places for fear of experiencing physical symptoms.  She reported she does not avoid doing what she needs to do, rather \"fights\" her way through it.      DSM5 Diagnoses: (Sustained by DSM5 Criteria Listed Above)  Diagnoses: 300.01 (F41.0) Panic Disorder   Generalized Anxiety Disorder, Provisional  Psychosocial & Contextual Factors: grief  WHODAS 2.0 (12 item) 12    Attendance Agreement:  Client has signed Attendance " Agreement:Yes      Collaboration:  Routed note to PCP.      Preliminary Treatment Plan:  The client reports no currently identified Jew, ethnic or cultural issues relevant to therapy.     services are not indicated.    Modifications to assist communication are not indicated.    The concerns identified by the client will be addressed in therapy.    Initial Treatment will focus on: Anxiety - alleviate symptoms and increase coping strategies.    As a preliminary treatment goal, client will experience a reduction in anxiety, will develop more effective coping skills to manage anxiety symptoms, will develop healthy cognitive patterns and beliefs and will increase ability to function adaptively.    The focus of initial interventions will be to alleviate anxiety, increase coping skills, reduce panic attacks and teach CBT skills.    Referral to another professional/service is not indicated at this time..    A Release of Information is not needed at this time.    Report to child / adult protection services was NA.    Patient will have open access to their mental health medical record.    ALICJA Burden  February 14, 2020

## 2020-02-15 ASSESSMENT — ANXIETY QUESTIONNAIRES: GAD7 TOTAL SCORE: 11

## 2020-02-17 ENCOUNTER — OFFICE VISIT (OUTPATIENT)
Dept: CARDIOLOGY | Facility: CLINIC | Age: 70
End: 2020-02-17
Payer: MEDICARE

## 2020-02-17 VITALS
SYSTOLIC BLOOD PRESSURE: 128 MMHG | WEIGHT: 169.2 LBS | HEIGHT: 64 IN | HEART RATE: 84 BPM | DIASTOLIC BLOOD PRESSURE: 80 MMHG | BODY MASS INDEX: 28.89 KG/M2

## 2020-02-17 DIAGNOSIS — E78.5 HYPERLIPIDEMIA, UNSPECIFIED HYPERLIPIDEMIA TYPE: Primary | ICD-10-CM

## 2020-02-17 DIAGNOSIS — I49.8 OTHER CARDIAC ARRHYTHMIA: ICD-10-CM

## 2020-02-17 DIAGNOSIS — Z13.6 CARDIOVASCULAR SCREENING; LDL GOAL LESS THAN 160: ICD-10-CM

## 2020-02-17 PROCEDURE — 93000 ELECTROCARDIOGRAM COMPLETE: CPT | Performed by: INTERNAL MEDICINE

## 2020-02-17 PROCEDURE — 99203 OFFICE O/P NEW LOW 30 MIN: CPT | Performed by: INTERNAL MEDICINE

## 2020-02-17 ASSESSMENT — MIFFLIN-ST. JEOR: SCORE: 1267.49

## 2020-02-17 NOTE — LETTER
2/17/2020    Leann Sue MD  [address] [facility] [address]    RE: Carola Cox       Dear Colleague,    I had the pleasure of seeing Carola Cox in the [facility].    CARDIOLOGY CONSULT    REASON FOR CONSULT: palpitations    PRIMARY CARE PHYSICIAN:  Leann Sue    HISTORY OF PRESENT ILLNESS:  69-year-old female with no cardiac history is seen for palpitations.     She had palpitations many years ago and was on atenolol until 2011.  She had minimal symptoms for many years off any medication.    She was seen in the ED July 2019 for palpitations.  Rhythm was sinus.  EKG was normal.     7-day ZIO July 2019 showed sinus rhythm, 7 runs of SVT up to 10 beats, rare ectopy, no sustained arrhythmias.  She had some symptoms correlating with single PACs, she did not have any symptoms during the short SVT runs.    She describes her palpitations as a pulsating mostly in her upper left neck.  This comes on randomly, will last 1 or 2 seconds, and occurs every few days, sometimes a few times per day.  There is no associated lightheadedness, dizziness, syncope, or chest pain.    She was on atenolol for about 3 months, but felt quite fatigued.  She tried a very low dose of propanolol, but also did not think this helped.    PAST MEDICAL HISTORY:  Past Medical History:   Diagnosis Date     Atrophic vaginitis      BPPV (benign paroxysmal positional vertigo)      Cardiac dysrhythmia     beta blocker for this in past     Endometriosis of pelvic peritoneum      Generalized anxiety disorder 2014    cannot tolerate SSRIs. Trial of 12.5mg sertraline and had severe nausea, diarrhea, dizziness. low dose propranolol prn and counseling      History of postmenopausal HRT      Hyperlipidemia      Osteoporosis        MEDICATIONS:  Current Outpatient Medications   Medication     Cholecalciferol (VITAMIN D PO)     famotidine (PEPCID) 10 MG tablet     hydrOXYzine  (ATARAX) 25 MG tablet     meclizine (ANTIVERT) 25 MG tablet     simvastatin (ZOCOR) 20 MG tablet     VENTOLIN  (90 Base) MCG/ACT Inhaler     propranolol (INDERAL) 10 MG tablet     No current facility-administered medications for this visit.        ALLERGIES:  Allergies   Allergen Reactions     Cefprozil Hives     Cefzil     Penicillin V        SOCIAL HISTORY:  I have reviewed this patient's social history and updated it with pertinent information if needed. Carola Cox  reports that she quit smoking about 41 years ago. She has never used smokeless tobacco. She reports previous alcohol use. She reports that she does not use drugs.    FAMILY HISTORY:  I have reviewed this patient's family history and updated it with pertinent information if needed.   Family History   Problem Relation Age of Onset     Hyperlipidemia Father      Hypertension Father      Breast Cancer Maternal Grandmother      Heart Disease Other         Family History     Thyroid Disease Sister        REVIEW OF SYSTEMS:  Constitutional:  No weight loss, fever, chills, weakness or fatigue.  HEENT:  Eyes:  No visual loss, blurred vision, double vision or yellow sclerae. No hearing loss, sneezing, congestion, runny nose or sore throat.  Skin:  No rash or itching.  Cardiovascular: per HPI  Respiratory: per HPI  GI:  No anorexia, nausea, vomiting or diarrhea. No abdominal pain or blood.  :  No dysurea, hematuria  Neurologic:  No headache, dizziness, syncope, paralysis, ataxia, numbness or tingling in the extremities. No change in bowel or bladder control.  Musculoskeletal:  No muscle, back pain, joint pain or stiffness.  Hematologic:  No anemia, bleeding or bruising.  Lymphatics:  No enlarged nodes. No history of splenectomy.  Psychiatric:  No history of depression or anxiety.  Endocrine:  No reports of sweating, cold or heat intolerance. No polyuria or polydipsia.  Allergies:  No history of asthma, hives, eczema or rhinitis.    PHYSICAL  "EXAM:  /80 (BP Location: Right arm, Patient Position: Sitting, Cuff Size: Adult Regular)   Pulse 84   Ht 1.626 m (5' 4\")   Wt 76.7 kg (169 lb 3.2 oz)   BMI 29.04 kg/m       Constitutional: awake, alert, no distress  Eyes: PERRL, sclera nonicteric  ENT: trachea midline  Respiratory: Lungs clear  Cardiovascular: Regular rate and rhythm, no murmur, no ectopy  GI: nondistended, nontender, bowel sounds present  Lymph/Hematologic: no lymphadenopathy  Skin: dry, no rash  Musculoskeletal: good muscle tone, strength 5/5 in upper and lower extremities  Neurologic: no focal deficits  Neuropsychiatric: appropriate affact    DATA:  Labs:   July 2019: TSH 1.0  Recent Labs   Lab Test 11/06/19  1123 11/23/18  0909  10/28/15  0937   CHOL 183 158   < > 152   HDL 60 60   < > 55   * 80   < > 76   TRIG 117 90   < > 104   CHOLHDLRATIO  --   --   --  2.8    < > = values in this interval not displayed.       EKG: February 17, 2020: Sinus rhythm, rate 80, no ectopy    ASSESSMENT:  69-year-old female seen for palpitations.  She may be feeling some isolated PACs or very short runs of SVT.  There has been no atrial fibrillation or other sustained arrhythmia documented.  Her symptoms only last a few seconds.  We talked about the benign nature of PACs and short runs of SVT.  If her symptoms are not too bothersome and not too frequent, no medication would be required.    If her symptoms become more frequent, it would be reasonable to retry medication.  She did not seem to tolerate atenolol or propanolol.  Therefore diltiazem might be a reasonable next option to try.  Otherwise her thyroid and other labs have been normal.  She has no other cardiac symptoms or findings on exam to suggest any significant valve disease.    RECOMMENDATIONS:  1.  Palpitations, possibly from isolated PACs or short runs of SVT  - Patient preference is for no medical therapy currently  -If symptoms increase in frequency or intensity, consider trial of " diltiazem 120 mg once daily long-acting    Follow-up as needed.    Delgado Solano MD  Cardiology - [facility]  Pager:  [phone]  Text Page  February 17, 2020      Thank you for allowing me to participate in the care of your patient.      Sincerely,     Delgado Solano MD     [facility] [place]    cc:   No referring provider defined for this encounter.

## 2020-02-17 NOTE — LETTER
2/17/2020    Leann Sue MD  [address] [facility] [address]    RE: Carola Cox       Dear Colleague,    I had the pleasure of seeing Carola Cox in the [facility].    CARDIOLOGY CONSULT    REASON FOR CONSULT: palpitations    PRIMARY CARE PHYSICIAN:  Leann Sue    HISTORY OF PRESENT ILLNESS:  69-year-old female with no cardiac history is seen for palpitations.     She had palpitations many years ago and was on atenolol until 2011.  She had minimal symptoms for many years off any medication.    She was seen in the ED July 2019 for palpitations.  Rhythm was sinus.  EKG was normal.     7-day ZIO July 2019 showed sinus rhythm, 7 runs of SVT up to 10 beats, rare ectopy, no sustained arrhythmias.  She had some symptoms correlating with single PACs, she did not have any symptoms during the short SVT runs.    She describes her palpitations as a pulsating mostly in her upper left neck.  This comes on randomly, will last 1 or 2 seconds, and occurs every few days, sometimes a few times per day.  There is no associated lightheadedness, dizziness, syncope, or chest pain.    She was on atenolol for about 3 months, but felt quite fatigued.  She tried a very low dose of propanolol, but also did not think this helped.    PAST MEDICAL HISTORY:  Past Medical History:   Diagnosis Date     Atrophic vaginitis      BPPV (benign paroxysmal positional vertigo)      Cardiac dysrhythmia     beta blocker for this in past     Endometriosis of pelvic peritoneum      Generalized anxiety disorder 2014    cannot tolerate SSRIs. Trial of 12.5mg sertraline and had severe nausea, diarrhea, dizziness. low dose propranolol prn and counseling      History of postmenopausal HRT      Hyperlipidemia      Osteoporosis        MEDICATIONS:  Current Outpatient Medications   Medication     Cholecalciferol (VITAMIN D PO)     famotidine (PEPCID) 10 MG tablet     hydrOXYzine  (ATARAX) 25 MG tablet     meclizine (ANTIVERT) 25 MG tablet     simvastatin (ZOCOR) 20 MG tablet     VENTOLIN  (90 Base) MCG/ACT Inhaler     propranolol (INDERAL) 10 MG tablet     No current facility-administered medications for this visit.        ALLERGIES:  Allergies   Allergen Reactions     Cefprozil Hives     Cefzil     Penicillin V        SOCIAL HISTORY:  I have reviewed this patient's social history and updated it with pertinent information if needed. Carola Cox  reports that she quit smoking about 41 years ago. She has never used smokeless tobacco. She reports previous alcohol use. She reports that she does not use drugs.    FAMILY HISTORY:  I have reviewed this patient's family history and updated it with pertinent information if needed.   Family History   Problem Relation Age of Onset     Hyperlipidemia Father      Hypertension Father      Breast Cancer Maternal Grandmother      Heart Disease Other         Family History     Thyroid Disease Sister        REVIEW OF SYSTEMS:  Constitutional:  No weight loss, fever, chills, weakness or fatigue.  HEENT:  Eyes:  No visual loss, blurred vision, double vision or yellow sclerae. No hearing loss, sneezing, congestion, runny nose or sore throat.  Skin:  No rash or itching.  Cardiovascular: per HPI  Respiratory: per HPI  GI:  No anorexia, nausea, vomiting or diarrhea. No abdominal pain or blood.  :  No dysurea, hematuria  Neurologic:  No headache, dizziness, syncope, paralysis, ataxia, numbness or tingling in the extremities. No change in bowel or bladder control.  Musculoskeletal:  No muscle, back pain, joint pain or stiffness.  Hematologic:  No anemia, bleeding or bruising.  Lymphatics:  No enlarged nodes. No history of splenectomy.  Psychiatric:  No history of depression or anxiety.  Endocrine:  No reports of sweating, cold or heat intolerance. No polyuria or polydipsia.  Allergies:  No history of asthma, hives, eczema or rhinitis.    PHYSICAL  "EXAM:  /80 (BP Location: Right arm, Patient Position: Sitting, Cuff Size: Adult Regular)   Pulse 84   Ht 1.626 m (5' 4\")   Wt 76.7 kg (169 lb 3.2 oz)   BMI 29.04 kg/m       Constitutional: awake, alert, no distress  Eyes: PERRL, sclera nonicteric  ENT: trachea midline  Respiratory: Lungs clear  Cardiovascular: Regular rate and rhythm, no murmur, no ectopy  GI: nondistended, nontender, bowel sounds present  Lymph/Hematologic: no lymphadenopathy  Skin: dry, no rash  Musculoskeletal: good muscle tone, strength 5/5 in upper and lower extremities  Neurologic: no focal deficits  Neuropsychiatric: appropriate affact    DATA:  Labs:   July 2019: TSH 1.0  Recent Labs   Lab Test 11/06/19  1123 11/23/18  0909  10/28/15  0937   CHOL 183 158   < > 152   HDL 60 60   < > 55   * 80   < > 76   TRIG 117 90   < > 104   CHOLHDLRATIO  --   --   --  2.8    < > = values in this interval not displayed.       EKG: February 17, 2020: Sinus rhythm, rate 80, no ectopy    ASSESSMENT:  69-year-old female seen for palpitations.  She may be feeling some isolated PACs or very short runs of SVT.  There has been no atrial fibrillation or other sustained arrhythmia documented.  Her symptoms only last a few seconds.  We talked about the benign nature of PACs and short runs of SVT.  If her symptoms are not too bothersome and not too frequent, no medication would be required.    If her symptoms become more frequent, it would be reasonable to retry medication.  She did not seem to tolerate atenolol or propanolol.  Therefore diltiazem might be a reasonable next option to try.  Otherwise her thyroid and other labs have been normal.  She has no other cardiac symptoms or findings on exam to suggest any significant valve disease.    RECOMMENDATIONS:  1.  Palpitations, possibly from isolated PACs or short runs of SVT  - Patient preference is for no medical therapy currently  -If symptoms increase in frequency or intensity, consider trial of " diltiazem 120 mg once daily long-acting    Follow-up as needed.    Delgado Solano MD  Cardiology - [facility]  Pager:  [phone]  Text Page  February 17, 2020      Thank you for allowing me to participate in the care of your patient.    Sincerely,     Delgado Solano MD     [facility]

## 2020-02-17 NOTE — PROGRESS NOTES
CARDIOLOGY CONSULT    REASON FOR CONSULT: palpitations    PRIMARY CARE PHYSICIAN:  Leann Sue    HISTORY OF PRESENT ILLNESS:  69-year-old female with no cardiac history is seen for palpitations.     She had palpitations many years ago and was on atenolol until 2011.  She had minimal symptoms for many years off any medication.    She was seen in the ED July 2019 for palpitations.  Rhythm was sinus.  EKG was normal.     7-day ZIO July 2019 showed sinus rhythm, 7 runs of SVT up to 10 beats, rare ectopy, no sustained arrhythmias.  She had some symptoms correlating with single PACs, she did not have any symptoms during the short SVT runs.    She describes her palpitations as a pulsating mostly in her upper left neck.  This comes on randomly, will last 1 or 2 seconds, and occurs every few days, sometimes a few times per day.  There is no associated lightheadedness, dizziness, syncope, or chest pain.    She was on atenolol for about 3 months, but felt quite fatigued.  She tried a very low dose of propanolol, but also did not think this helped.    PAST MEDICAL HISTORY:  Past Medical History:   Diagnosis Date     Atrophic vaginitis      BPPV (benign paroxysmal positional vertigo)      Cardiac dysrhythmia     beta blocker for this in past     Endometriosis of pelvic peritoneum      Generalized anxiety disorder 2014    cannot tolerate SSRIs. Trial of 12.5mg sertraline and had severe nausea, diarrhea, dizziness. low dose propranolol prn and counseling      History of postmenopausal HRT      Hyperlipidemia      Osteoporosis        MEDICATIONS:  Current Outpatient Medications   Medication     Cholecalciferol (VITAMIN D PO)     famotidine (PEPCID) 10 MG tablet     hydrOXYzine (ATARAX) 25 MG tablet     meclizine (ANTIVERT) 25 MG tablet     simvastatin (ZOCOR) 20 MG tablet     VENTOLIN  (90 Base) MCG/ACT Inhaler     propranolol (INDERAL) 10 MG tablet     No current facility-administered medications for this  "visit.        ALLERGIES:  Allergies   Allergen Reactions     Cefprozil Hives     Cefzil     Penicillin V        SOCIAL HISTORY:  I have reviewed this patient's social history and updated it with pertinent information if needed. Carola Cox  reports that she quit smoking about 41 years ago. She has never used smokeless tobacco. She reports previous alcohol use. She reports that she does not use drugs.    FAMILY HISTORY:  I have reviewed this patient's family history and updated it with pertinent information if needed.   Family History   Problem Relation Age of Onset     Hyperlipidemia Father      Hypertension Father      Breast Cancer Maternal Grandmother      Heart Disease Other         Family History     Thyroid Disease Sister        REVIEW OF SYSTEMS:  Constitutional:  No weight loss, fever, chills, weakness or fatigue.  HEENT:  Eyes:  No visual loss, blurred vision, double vision or yellow sclerae. No hearing loss, sneezing, congestion, runny nose or sore throat.  Skin:  No rash or itching.  Cardiovascular: per HPI  Respiratory: per HPI  GI:  No anorexia, nausea, vomiting or diarrhea. No abdominal pain or blood.  :  No dysurea, hematuria  Neurologic:  No headache, dizziness, syncope, paralysis, ataxia, numbness or tingling in the extremities. No change in bowel or bladder control.  Musculoskeletal:  No muscle, back pain, joint pain or stiffness.  Hematologic:  No anemia, bleeding or bruising.  Lymphatics:  No enlarged nodes. No history of splenectomy.  Psychiatric:  No history of depression or anxiety.  Endocrine:  No reports of sweating, cold or heat intolerance. No polyuria or polydipsia.  Allergies:  No history of asthma, hives, eczema or rhinitis.    PHYSICAL EXAM:  /80 (BP Location: Right arm, Patient Position: Sitting, Cuff Size: Adult Regular)   Pulse 84   Ht 1.626 m (5' 4\")   Wt 76.7 kg (169 lb 3.2 oz)   BMI 29.04 kg/m      Constitutional: awake, alert, no distress  Eyes: PERRL, sclera " nonicteric  ENT: trachea midline  Respiratory: Lungs clear  Cardiovascular: Regular rate and rhythm, no murmur, no ectopy  GI: nondistended, nontender, bowel sounds present  Lymph/Hematologic: no lymphadenopathy  Skin: dry, no rash  Musculoskeletal: good muscle tone, strength 5/5 in upper and lower extremities  Neurologic: no focal deficits  Neuropsychiatric: appropriate affact    DATA:  Labs:   July 2019: TSH 1.0  Recent Labs   Lab Test 11/06/19  1123 11/23/18  0909  10/28/15  0937   CHOL 183 158   < > 152   HDL 60 60   < > 55   * 80   < > 76   TRIG 117 90   < > 104   CHOLHDLRATIO  --   --   --  2.8    < > = values in this interval not displayed.       EKG: February 17, 2020: Sinus rhythm, rate 80, no ectopy    ASSESSMENT:  69-year-old female seen for palpitations.  She may be feeling some isolated PACs or very short runs of SVT.  There has been no atrial fibrillation or other sustained arrhythmia documented.  Her symptoms only last a few seconds.  We talked about the benign nature of PACs and short runs of SVT.  If her symptoms are not too bothersome and not too frequent, no medication would be required.    If her symptoms become more frequent, it would be reasonable to retry medication.  She did not seem to tolerate atenolol or propanolol.  Therefore diltiazem might be a reasonable next option to try.  Otherwise her thyroid and other labs have been normal.  She has no other cardiac symptoms or findings on exam to suggest any significant valve disease.    RECOMMENDATIONS:  1.  Palpitations, possibly from isolated PACs or short runs of SVT  - Patient preference is for no medical therapy currently  -If symptoms increase in frequency or intensity, consider trial of diltiazem 120 mg once daily long-acting    Follow-up as needed.    Delgado Solano MD  Cardiology - [facility]  Pager:  [phone]  Text Page  February 17, 2020

## 2020-02-21 ENCOUNTER — OFFICE VISIT (OUTPATIENT)
Dept: PSYCHOLOGY | Facility: CLINIC | Age: 70
End: 2020-02-21
Payer: MEDICARE

## 2020-02-21 DIAGNOSIS — F41.0 PANIC DISORDER WITHOUT AGORAPHOBIA: Primary | ICD-10-CM

## 2020-02-21 PROCEDURE — 90834 PSYTX W PT 45 MINUTES: CPT | Performed by: SOCIAL WORKER

## 2020-02-21 NOTE — PROGRESS NOTES
Progress Note    Patient Name: Carola Cox  Date: 2/21/2020         Service Type: Individual  Video Visit: No     Session Start Time: 11:34 AM  Session End Time: 12:18 AM     Session Length: 38-52 minutes    Session #: 2    Attendees: Client attended alone     Treatment Plan Last Reviewed: reviewed in session  PHQ-9 / AIDA-7 : 2/14/2020    DATA  Interactive Complexity: No  Crisis: No       Progress Since Last Session (Related to Symptoms / Goals / Homework):   Symptoms: Improving client denied experiencing a panic attack this week    Homework: Completed in session      Episode of Care Goals: Minimal progress - PREPARATION (Decided to change - considering how); Intervened by negotiating a change plan and determining options / strategies for behavior change, identifying triggers, exploring social supports, and working towards setting a date to begin behavior change     Current / Ongoing Stressors and Concerns:      Client reported recent funerals she has helped with have triggered her grief.       Treatment Objective(s) Addressed in This Session:   use cognitive strategies identified in therapy to challenge anxious thoughts     Intervention:   CBT: provided education on relationship between cognitions, feelings, and behaviors    Provided education on panic disorder  Modeled cognitive restructuring  Taught grounding techniques      ASSESSMENT: Current Emotional / Mental Status (status of significant symptoms):   Risk status (Self / Other harm or suicidal ideation)   Patient denies current fears or concerns for personal safety.   Patient denies current or recent suicidal ideation or behaviors.   Patientdenies current or recent homicidal ideation or behaviors.   Patient denies current or recent self injurious behavior or ideation.   Patient denies other safety concerns.   Patient reports there has been no change in risk factors since their last session.     Patientreports  there has been no change in protective factors since their last session.     Recommended that patient call 911 or go to the local ED should there be a change in any of these risk factors.     Appearance:   Appropriate    Eye Contact:   Good    Psychomotor Behavior: Normal    Attitude:   Cooperative    Orientation:   All   Speech    Rate / Production: Normal     Volume:  Normal    Mood:    Anxious -minimal   Affect:    Appropriate    Thought Content:  Clear    Thought Form:  Coherent  Logical    Insight:    Good      Medication Review:   No current psychiatric medications prescribed     Medication Compliance:   NA     Changes in Health Issues:   None reported     Chemical Use Review:   Substance Use: Chemical use reviewed, no active concerns identified      Tobacco Use: No current tobacco use.      Diagnosis:  300.01 (F41.0) Panic Disorder   Generalized Anxiety Disorder, Provisional    Collateral Reports Completed:   Not Applicable    PLAN: (Patient Tasks / Therapist Tasks / Other)  Client provided with a handout for coping with panic attacks      Sarah Sanders, [facility]  2/21/2020    ______________________________________________________________________    Treatment Plan    Patient's Name: Carola Cox  YOB: 1950    Date: 2/21/2020    DSM5 Diagnoses: 300.01 (F41.0) Panic Disorder  Psychosocial / Contextual Factors: grief  WHODAS: 12    Referral / Collaboration:  Referral to another professional/service is not indicated at this time..    Anticipated number of session or this episode of care: 12      MeasurableTreatment Goal(s) related to diagnosis / functional impairment(s)  Goal 1: Client's anxiety will remit as evidenced by a decrease in AIDA-7 score by at least 7 points or below a 5, where symptoms occur fewer than half the days for a minimum of 4 weeks.  Client's Goal:  I will know I've met my goal when I have tools to handle it.      Objective #A (Patient Action)    Patient will use at least 3  coping skills for anxiety management in the next 4 weeks.  Status: New - Date: 2/21/2020     Intervention(s)  Therapist will teach coping strategies such as grounding techniques .    Objective #B  Patient will use cognitive strategies identified in therapy to challenge anxious thoughts.  Status: New - Date: 2/21/2020     Intervention(s)  Therapist will teach the relationship between cognitions, feelings, and behaviors and cognitive restructuring techniques.    Objective #C  Patient will use thought-stopping strategy daily to reduce intrusive thoughts.  Status: New - Date: 2/21/2020     Intervention(s)  Therapist will teach thought-stopping .      Patient has reviewed and agreed to the above plan.      Sarah Sanders, ALICJA  February 21, 2020

## 2020-02-27 ENCOUNTER — MYC MEDICAL ADVICE (OUTPATIENT)
Dept: PHARMACY | Facility: CLINIC | Age: 70
End: 2020-02-27

## 2020-03-06 ENCOUNTER — OFFICE VISIT (OUTPATIENT)
Dept: PSYCHOLOGY | Facility: CLINIC | Age: 70
End: 2020-03-06
Payer: MEDICARE

## 2020-03-06 DIAGNOSIS — F41.0 PANIC DISORDER WITHOUT AGORAPHOBIA: Primary | ICD-10-CM

## 2020-03-06 PROCEDURE — 90834 PSYTX W PT 45 MINUTES: CPT | Performed by: SOCIAL WORKER

## 2020-03-06 NOTE — PROGRESS NOTES
Progress Note    Patient Name: Carola Cox  Date: 3/6/2020         Service Type: Individual  Video Visit: No     Session Start Time: 9:07 AM  Session End Time: 9:58 AM     Session Length: 38-52 minutes    Session #: 3    Attendees: Client attended alone     Treatment Plan Last Reviewed: 2/21/2020  PHQ-9 / AIDA-7 : 2/14/2020    DATA  Interactive Complexity: No  Crisis: No       Progress Since Last Session (Related to Symptoms / Goals / Homework):   Symptoms: Improving client reported continued improvements with anxiety    Homework: Achieved / completed to satisfaction      Episode of Care Goals: Satisfactory progress - ACTION (Actively working towards change); Intervened by reinforcing change plan / affirming steps taken     Current / Ongoing Stressors and Concerns:      Client reported she has been reading the coping skills for panic attacks and finding it to be relatable.  She reported she has been more aware of stopping irrational thoughts and using cognitive restructuring.  She reported that she experiences more anxiety at home because she is less busy.  Lack of control and physical symptoms are identified triggers.  She discussed history of family dynamics and her role within the family.     Treatment Objective(s) Addressed in This Session:   use cognitive strategies identified in therapy to challenge anxious thoughts     Intervention:   Reinforced behavior changes    Provided education on panic disorder  Engaged in solution focused questions  Offered validation  Addressed questions related to anxiety     ASSESSMENT: Current Emotional / Mental Status (status of significant symptoms):   Risk status (Self / Other harm or suicidal ideation)   Patient denies current fears or concerns for personal safety.   Patient denies current or recent suicidal ideation or behaviors.   Patientdenies current or recent homicidal ideation or behaviors.   Patient denies current or  recent self injurious behavior or ideation.   Patient denies other safety concerns.   Patient reports there has been no change in risk factors since their last session.     Patientreports there has been no change in protective factors since their last session.     Recommended that patient call 911 or go to the local ED should there be a change in any of these risk factors.     Appearance:   Appropriate    Eye Contact:   Good    Psychomotor Behavior: Normal    Attitude:   Cooperative    Orientation:   All   Speech    Rate / Production: Normal     Volume:  Normal    Mood:    Anxious -minimal   Affect:    Appropriate    Thought Content:  Clear    Thought Form:  Coherent  Logical    Insight:    Good      Medication Review:   No current psychiatric medications prescribed     Medication Compliance:   NA     Changes in Health Issues:   None reported     Chemical Use Review:   Substance Use: Chemical use reviewed, no active concerns identified      Tobacco Use: No current tobacco use.      Diagnosis:  300.01 (F41.0) Panic Disorder   Generalized Anxiety Disorder, Provisional    Collateral Reports Completed:   Not Applicable    PLAN: (Patient Tasks / Therapist Tasks / Other)  Encouraged client to continue reading handout for coping with panic attacks.  Encouraged continued use of thought-stopping and cognitive restructuring      Sarah Sanders, [facility]  3/6/2020      ______________________________________________________________________    Treatment Plan    Patient's Name: Carola Cox  YOB: 1950    Date: 2/21/2020    DSM5 Diagnoses: 300.01 (F41.0) Panic Disorder  Psychosocial / Contextual Factors: grief  WHODAS: 12    Referral / Collaboration:  Referral to another professional/service is not indicated at this time..    Anticipated number of session or this episode of care: 12      MeasurableTreatment Goal(s) related to diagnosis / functional impairment(s)  Goal 1: Client's anxiety will remit as evidenced  by a decrease in AIDA-7 score by at least 7 points or below a 5, where symptoms occur fewer than half the days for a minimum of 4 weeks.  Client's Goal:  I will know I've met my goal when I have tools to handle it.      Objective #A (Patient Action)    Patient will use at least 3 coping skills for anxiety management in the next 4 weeks.  Status: New - Date: 2/21/2020     Intervention(s)  Therapist will teach coping strategies such as grounding techniques .    Objective #B  Patient will use cognitive strategies identified in therapy to challenge anxious thoughts.  Status: New - Date: 2/21/2020     Intervention(s)  Therapist will teach the relationship between cognitions, feelings, and behaviors and cognitive restructuring techniques.    Objective #C  Patient will use thought-stopping strategy daily to reduce intrusive thoughts.  Status: New - Date: 2/21/2020     Intervention(s)  Therapist will teach thought-stopping .      Patient has reviewed and agreed to the above plan.      ALICJA Burden  February 21, 2020

## 2020-03-24 ENCOUNTER — TELEPHONE (OUTPATIENT)
Dept: PSYCHOLOGY | Facility: CLINIC | Age: 70
End: 2020-03-24

## 2020-03-24 NOTE — TELEPHONE ENCOUNTER
Therapist called client to notify her of transition to telephone session this week due to COVID-19; she voiced understanding and agreement.

## 2020-03-26 ENCOUNTER — VIRTUAL VISIT (OUTPATIENT)
Dept: PSYCHOLOGY | Facility: CLINIC | Age: 70
End: 2020-03-26
Payer: MEDICARE

## 2020-03-26 DIAGNOSIS — F41.0 PANIC DISORDER WITHOUT AGORAPHOBIA: Primary | ICD-10-CM

## 2020-03-26 PROCEDURE — 98968 PH1 ASSMT&MGMT NQHP 21-30: CPT | Performed by: SOCIAL WORKER

## 2020-03-26 ASSESSMENT — ANXIETY QUESTIONNAIRES
3. WORRYING TOO MUCH ABOUT DIFFERENT THINGS: NOT AT ALL
4. TROUBLE RELAXING: NOT AT ALL
1. FEELING NERVOUS, ANXIOUS, OR ON EDGE: NOT AT ALL
5. BEING SO RESTLESS THAT IT IS HARD TO SIT STILL: SEVERAL DAYS
GAD7 TOTAL SCORE: 1
7. FEELING AFRAID AS IF SOMETHING AWFUL MIGHT HAPPEN: NOT AT ALL
2. NOT BEING ABLE TO STOP OR CONTROL WORRYING: NOT AT ALL
6. BECOMING EASILY ANNOYED OR IRRITABLE: NOT AT ALL

## 2020-03-26 ASSESSMENT — PATIENT HEALTH QUESTIONNAIRE - PHQ9: SUM OF ALL RESPONSES TO PHQ QUESTIONS 1-9: 0

## 2020-03-26 NOTE — PROGRESS NOTES
"                                           Progress Note    Patient Name: Carola Cox  Date: 3/26/2020         Service Type: Individual      Session Start Time: 9:02 AM  Session End Time: 9:47 AM     Session Length: 38-52  minutes  Session #: 4    Attendees: Client attended alone    Telemedicine Visit:The patient has been notified of the following:      \"We have found that certain health care needs can be provided without the need for a face to face visit.  This service lets us provide the care you need with a phone conversation.       I will have full access to your [facility] medical record during this entire phone call.   I will be taking notes for your medical record.      Since this is like an office visit, we will bill your insurance company for this service.       There are potential benefits and risks of telephone visits (e.g. limits to patient confidentiality) that differ from in-person visits.?  Confidentiality still applies for telephone services, and nobody will record the visit.  It is important to be in a quiet, private space that is free of distractions (including cell phone or other devices) during the visit.??      If during the course of the call I believe a telephone visit is not appropriate, you will not be charged for this service\"     Consent has been obtained for this service by care team member: Yes       Reason for Telemedicine Visit: Services only offered telehealth    Originating Site (Patient Location): Patient's home    Distant Site (Provider Location): [facility]: [place]    Consent:  The patient/guardian has verbally consented to: the potential risks and benefits of telemedicine (video visit) versus in person care; bill my insurance or make self-payment for services provided; and responsibility for payment of non-covered services.      Treatment Plan Last Reviewed:2/21/2020  PHQ-9 / AIAD-7 : 3/26/2020      DATA  Interactive Complexity: No  Crisis: No       Progress " Since Last Session (Related to Symptoms / Goals / Homework):   Symptoms: Improving anxiety     Homework: Achieved / completed to satisfaction      Episode of Care Goals: Satisfactory progress - ACTION (Actively working towards change); Intervened by reinforcing change plan / affirming steps taken     Current / Ongoing Stressors and Concerns:     Client reported that she has been engaging in many ways to manage anxiety and recent change of not working and being home for the majority of the day.  She reported improvements with anxiety and effectiveness of coping strategies.  She reported she has been limiting media exposure, focusing on what she can control in the moment, using cognitive restructuring, referencing her educational material, thought-stopping, exercising, distraction, gratitude, and utilizing support from family and friends.     Treatment Objective(s) Addressed in This Session:   use cognitive strategies identified in therapy to challenge anxious thoughts     Intervention:   CBT: modeled cognitive restructuring    Explored triggers to anxiety   Reinforced behavior changes   Provided education on anxiety   Motivational interviewing: expressed empathy/understanding, reflective listening, open-ended questions       ASSESSMENT: Current Emotional / Mental Status (status of significant symptoms):   Risk status (Self / Other harm or suicidal ideation)   Patient denies current fears or concerns for personal safety.   Patient denies current or recent suicidal ideation or behaviors.   Patient denies current or recent homicidal ideation or behaviors.   Patient denies current or recent self injurious behavior or ideation.   Patient denies other safety concerns.   Patient reports there has been no change in risk factors since their last session.     Patient reports there has been no change in protective factors since their last session.     Recommended that patient call 911 or go to the local ED should there be a  change in any of these risk factors.     Appearance:  unable to assess    Eye Contact:   unable to assess    Psychomotor Behavior: unable to assess    Attitude:   Cooperative  Pleasant   Orientation:   All   Speech    Rate / Production: Normal/ Responsive    Volume:  Normal    Mood:    Normal   Affect:    unable to assess    Thought Content:  Clear    Thought Form:  Coherent  Goal Directed  Logical    Insight:    Good      Medication Review:   No current psychiatric medications prescribed     Medication Compliance:   NA     Changes in Health Issues:   None reported     Chemical Use Review:   Substance Use: Chemical use reviewed, no active concerns identified      Tobacco Use: No current tobacco use.      Diagnosis:  300.01 (F41.0) Panic Disorder   Generalized Anxiety Disorder, Provisional    Collateral Reports Completed:   Not Applicable    PLAN: (Patient Tasks / Therapist Tasks / Other)  Client to continue using strategies she has identified as helpful in managing anxiety symptoms; cognitive restructuring, referencing her educational material, thought-stopping, exercising, distraction, gratitude, and utilizing support from family and friends.      Sarah Sanders, [facility] 3/26/2020                                                 ______________________________________________________________________    Treatment Plan     Patient's Name: Carola Cox                        YOB: 1950     Date: 2/21/2020     DSM5 Diagnoses: 300.01 (F41.0) Panic Disorder  Psychosocial / Contextual Factors: grief  WHODAS: 12     Referral / Collaboration:  Referral to another professional/service is not indicated at this time..     Anticipated number of session or this episode of care: 12        MeasurableTreatment Goal(s) related to diagnosis / functional impairment(s)  Goal 1: Client's anxiety will remit as evidenced by a decrease in AIDA-7 score by at least 7 points or below a 5, where symptoms occur fewer than half  the days for a minimum of 4 weeks.  Client's Goal:  I will know I've met my goal when I have tools to handle it.       Objective #A (Patient Action)                          Patient will use at least 3 coping skills for anxiety management in the next 4 weeks.  Status: New - Date: 2/21/2020      Intervention(s)  Therapist will teach coping strategies such as grounding techniques .     Objective #B  Patient will use cognitive strategies identified in therapy to challenge anxious thoughts.  Status: New - Date: 2/21/2020      Intervention(s)  Therapist will teach the relationship between cognitions, feelings, and behaviors and cognitive restructuring techniques.     Objective #C  Patient will use thought-stopping strategy daily to reduce intrusive thoughts.  Status: New - Date: 2/21/2020      Intervention(s)  Therapist will teach thought-stopping .        Patient has reviewed and agreed to the above plan.        ALICJA Burden                  February 21, 2020

## 2020-03-27 ASSESSMENT — ANXIETY QUESTIONNAIRES: GAD7 TOTAL SCORE: 1

## 2020-04-24 ENCOUNTER — VIRTUAL VISIT (OUTPATIENT)
Dept: PSYCHOLOGY | Facility: CLINIC | Age: 70
End: 2020-04-24
Payer: MEDICARE

## 2020-04-24 DIAGNOSIS — F41.0 PANIC DISORDER WITHOUT AGORAPHOBIA: Primary | ICD-10-CM

## 2020-04-24 PROCEDURE — 90834 PSYTX W PT 45 MINUTES: CPT | Mod: 95 | Performed by: SOCIAL WORKER

## 2020-04-24 ASSESSMENT — ANXIETY QUESTIONNAIRES
1. FEELING NERVOUS, ANXIOUS, OR ON EDGE: SEVERAL DAYS
7. FEELING AFRAID AS IF SOMETHING AWFUL MIGHT HAPPEN: NOT AT ALL
GAD7 TOTAL SCORE: 2
5. BEING SO RESTLESS THAT IT IS HARD TO SIT STILL: SEVERAL DAYS
3. WORRYING TOO MUCH ABOUT DIFFERENT THINGS: NOT AT ALL
4. TROUBLE RELAXING: NOT AT ALL
2. NOT BEING ABLE TO STOP OR CONTROL WORRYING: NOT AT ALL
6. BECOMING EASILY ANNOYED OR IRRITABLE: NOT AT ALL

## 2020-04-24 ASSESSMENT — PATIENT HEALTH QUESTIONNAIRE - PHQ9: SUM OF ALL RESPONSES TO PHQ QUESTIONS 1-9: 1

## 2020-04-24 NOTE — PROGRESS NOTES
"                                           Progress Note    Patient Name: Carola Cox  Date: 4/24/2020         Service Type: Individual      Session Start Time: 9:05 AM  Session End Time: 9:45 AM     Session Length: 38-52  minutes  Session #: 5    Attendees: Client attended alone     Telemedicine Visit: The patient has been notified of the following:      \"We have found that certain health care needs can be provided without the need for a face to face visit.  This service lets us provide the care you need with a phone conversation.       I will have full access to your [place] medical record during this entire phone call.   I will be taking notes for your medical record.      Since this is like an office visit, we will bill your insurance company for this service.       There are potential benefits and risks of telephone visits (e.g. limits to patient confidentiality) that differ from in-person visits.?  Confidentiality still applies for telephone services, and nobody will record the visit.  It is important to be in a quiet, private space that is free of distractions (including cell phone or other devices) during the visit.??      If during the course of the call I believe a telephone visit is not appropriate, you will not be charged for this service\"     Consent has been obtained for this service by care team member: Yes       Attempted video: unable to connect     Reason for Telemedicine Visit: Services only offered telehealth    Originating Site (Patient Location): Patient's home    Distant Site (Provider Location): [facility]: [place]    Consent:  The patient/guardian has verbally consented to: the potential risks and benefits of telemedicine (video visit) versus in person care; bill my insurance or make self-payment for services provided; and responsibility for payment of non-covered services.      Treatment Plan Last Reviewed:2/21/2020  PHQ-9 / AIDA-7 : 4/24/2020      DATA  Interactive " Complexity: No  Crisis: No       Progress Since Last Session (Related to Symptoms / Goals / Homework):   Symptoms: Improving anxiety     Homework: Achieved / completed to satisfaction       Episode of Care Goals: Achieved / completed to satisfaction - ACTION (Actively working towards change); Intervened by reinforcing change plan / affirming steps taken     Current / Ongoing Stressors and Concerns:     Client discussed ways she continues to implement skills to manage anxiety.  She reported that she has put post-it notes throughout the house with helpful phrases as a reminder that she is fine.  She continues to reference the handout on panic attacks and finds that to be helpful.  She reported awareness that her thought patterns have shifted in a positive manner.  She indicated that she has been able to stay in the present moment.  She has been engaging with family and friends as she is able, engaging in areas of interest, including daily exercise.  She reported minimal symptoms.       Treatment Objective(s) Addressed in This Session:   use cognitive strategies identified in therapy to challenge anxious thoughts     Intervention:   CBT: modeled cognitive restructuring    Reinforced behavior changes   Motivational interviewing: expressed empathy/understanding, use of reflective listening, and open-ended questions       ASSESSMENT: Current Emotional / Mental Status (status of significant symptoms):   Risk status (Self / Other harm or suicidal ideation)   Patient denies current fears or concerns for personal safety.   Patient denies current or recent suicidal ideation or behaviors.   Patient denies current or recent homicidal ideation or behaviors.   Patient denies current or recent self injurious behavior or ideation.   Patient denies other safety concerns.   Patient reports there has been no change in risk factors since their last session.     Patient reports there has been no change in protective factors since their  last session.     Recommended that patient call 911 or go to the local ED should there be a change in any of these risk factors.     Appearance:  unable to assess    Eye Contact:   unable to assess    Psychomotor Behavior: unable to assess    Attitude:   Cooperative  Pleasant   Orientation:   All   Speech    Rate / Production: Normal/ Responsive    Volume:  Normal    Mood:    Normal   Affect:    unable to assess    Thought Content:  Clear    Thought Form:  Coherent  Goal Directed  Logical    Insight:    Good      Medication Review:   No current psychiatric medications prescribed     Medication Compliance:   NA     Changes in Health Issues:   None reported     Chemical Use Review:   Substance Use: Chemical use reviewed, no active concerns identified      Tobacco Use: No current tobacco use.      Diagnosis:  300.01 (F41.0) Panic Disorder     Collateral Reports Completed:   Not Applicable    PLAN: (Patient Tasks / Therapist Tasks / Other)  Client will call to schedule follow-up appointment, if needed.  Encouraged to continue to utilize coping strategies she has implemented.      Sarah Sanders, [facility] 4/24/2020                                                 ______________________________________________________________________    Treatment Plan     Patient's Name: Carola Cox                        YOB: 1950     Date: 2/21/2020     DSM5 Diagnoses: 300.01 (F41.0) Panic Disorder  Psychosocial / Contextual Factors: grief  WHODAS: 12     Referral / Collaboration:  Referral to another professional/service is not indicated at this time..     Anticipated number of session or this episode of care: 12        MeasurableTreatment Goal(s) related to diagnosis / functional impairment(s)  Goal 1: Client's anxiety will remit as evidenced by a decrease in AIDA-7 score by at least 7 points or below a 5, where symptoms occur fewer than half the days for a minimum of 4 weeks.  Client's Goal:  I will know I've met my  goal when I have tools to handle it.       Objective #A (Patient Action)                          Patient will use at least 3 coping skills for anxiety management in the next 4 weeks.  Status: New - Date: 2/21/2020      Intervention(s)  Therapist will teach coping strategies such as grounding techniques .     Objective #B  Patient will use cognitive strategies identified in therapy to challenge anxious thoughts.  Status: New - Date: 2/21/2020      Intervention(s)  Therapist will teach the relationship between cognitions, feelings, and behaviors and cognitive restructuring techniques.     Objective #C  Patient will use thought-stopping strategy daily to reduce intrusive thoughts.  Status: New - Date: 2/21/2020      Intervention(s)  Therapist will teach thought-stopping .        Patient has reviewed and agreed to the above plan.        Sarah Sanders, [facility]                  February 21, 2020

## 2020-04-25 ASSESSMENT — ANXIETY QUESTIONNAIRES: GAD7 TOTAL SCORE: 2

## 2020-07-24 DIAGNOSIS — F41.1 GENERALIZED ANXIETY DISORDER: ICD-10-CM

## 2020-07-29 NOTE — TELEPHONE ENCOUNTER
Left a voicemail asking patient to call the clinic back. Please advise patient of message below.     Is patient still taking this medication?

## 2020-07-30 RX ORDER — PROPRANOLOL HYDROCHLORIDE 10 MG/1
5 TABLET ORAL 2 TIMES DAILY
Qty: 90 TABLET | Refills: 1 | OUTPATIENT
Start: 2020-07-30

## 2020-08-04 ENCOUNTER — APPOINTMENT (OUTPATIENT)
Dept: CT IMAGING | Facility: CLINIC | Age: 70
DRG: 694 | End: 2020-08-04
Attending: EMERGENCY MEDICINE
Payer: MEDICARE

## 2020-08-04 ENCOUNTER — HOSPITAL ENCOUNTER (INPATIENT)
Facility: CLINIC | Age: 70
LOS: 1 days | Discharge: HOME | DRG: 694 | End: 2020-08-05
Attending: EMERGENCY MEDICINE | Admitting: INTERNAL MEDICINE
Payer: MEDICARE

## 2020-08-04 ENCOUNTER — NURSE TRIAGE (OUTPATIENT)
Dept: NURSING | Facility: CLINIC | Age: 70
End: 2020-08-04

## 2020-08-04 DIAGNOSIS — N30.01 ACUTE CYSTITIS WITH HEMATURIA: ICD-10-CM

## 2020-08-04 DIAGNOSIS — N20.0 BILATERAL KIDNEY STONES: ICD-10-CM

## 2020-08-04 DIAGNOSIS — K21.9 GASTROESOPHAGEAL REFLUX DISEASE WITHOUT ESOPHAGITIS: Primary | ICD-10-CM

## 2020-08-04 LAB
ALBUMIN UR-MCNC: 20 MG/DL
ANION GAP SERPL CALCULATED.3IONS-SCNC: 7 MMOL/L (ref 3–14)
APPEARANCE UR: CLEAR
BACTERIA #/AREA URNS HPF: ABNORMAL /HPF
BASOPHILS # BLD AUTO: 0.1 10E9/L (ref 0–0.2)
BASOPHILS NFR BLD AUTO: 0.4 %
BILIRUB UR QL STRIP: NEGATIVE
BUN SERPL-MCNC: 18 MG/DL (ref 7–30)
CALCIUM SERPL-MCNC: 9.5 MG/DL (ref 8.5–10.1)
CHLORIDE SERPL-SCNC: 103 MMOL/L (ref 94–109)
CO2 SERPL-SCNC: 26 MMOL/L (ref 20–32)
COLOR UR AUTO: ABNORMAL
CREAT SERPL-MCNC: 0.89 MG/DL (ref 0.52–1.04)
DIFFERENTIAL METHOD BLD: ABNORMAL
EOSINOPHIL # BLD AUTO: 0.1 10E9/L (ref 0–0.7)
EOSINOPHIL NFR BLD AUTO: 0.6 %
ERYTHROCYTE [DISTWIDTH] IN BLOOD BY AUTOMATED COUNT: 12.5 % (ref 10–15)
GFR SERPL CREATININE-BSD FRML MDRD: 64 ML/MIN/{1.73_M2}
GLUCOSE SERPL-MCNC: 110 MG/DL (ref 70–99)
GLUCOSE UR STRIP-MCNC: NEGATIVE MG/DL
HCT VFR BLD AUTO: 48.3 % (ref 35–47)
HGB BLD-MCNC: 15.2 G/DL (ref 11.7–15.7)
HGB UR QL STRIP: ABNORMAL
HYALINE CASTS #/AREA URNS LPF: 1 /LPF (ref 0–2)
IMM GRANULOCYTES # BLD: 0 10E9/L (ref 0–0.4)
IMM GRANULOCYTES NFR BLD: 0.3 %
KETONES UR STRIP-MCNC: NEGATIVE MG/DL
LEUKOCYTE ESTERASE UR QL STRIP: ABNORMAL
LYMPHOCYTES # BLD AUTO: 1.5 10E9/L (ref 0.8–5.3)
LYMPHOCYTES NFR BLD AUTO: 11.7 %
MCH RBC QN AUTO: 29.6 PG (ref 26.5–33)
MCHC RBC AUTO-ENTMCNC: 31.5 G/DL (ref 31.5–36.5)
MCV RBC AUTO: 94 FL (ref 78–100)
MONOCYTES # BLD AUTO: 0.8 10E9/L (ref 0–1.3)
MONOCYTES NFR BLD AUTO: 6.1 %
MUCOUS THREADS #/AREA URNS LPF: PRESENT /LPF
NEUTROPHILS # BLD AUTO: 10.2 10E9/L (ref 1.6–8.3)
NEUTROPHILS NFR BLD AUTO: 80.9 %
NITRATE UR QL: NEGATIVE
NRBC # BLD AUTO: 0 10*3/UL
NRBC BLD AUTO-RTO: 0 /100
PH UR STRIP: 5.5 PH (ref 5–7)
PLATELET # BLD AUTO: 243 10E9/L (ref 150–450)
POTASSIUM SERPL-SCNC: 3.3 MMOL/L (ref 3.4–5.3)
RBC # BLD AUTO: 5.14 10E12/L (ref 3.8–5.2)
RBC #/AREA URNS AUTO: 35 /HPF (ref 0–2)
SODIUM SERPL-SCNC: 136 MMOL/L (ref 133–144)
SOURCE: ABNORMAL
SP GR UR STRIP: 1.01 (ref 1–1.03)
SQUAMOUS #/AREA URNS AUTO: 2 /HPF (ref 0–1)
UROBILINOGEN UR STRIP-MCNC: NORMAL MG/DL (ref 0–2)
WBC # BLD AUTO: 12.6 10E9/L (ref 4–11)
WBC #/AREA URNS AUTO: 15 /HPF (ref 0–5)

## 2020-08-04 PROCEDURE — 99285 EMERGENCY DEPT VISIT HI MDM: CPT | Mod: 25

## 2020-08-04 PROCEDURE — 81001 URINALYSIS AUTO W/SCOPE: CPT | Performed by: EMERGENCY MEDICINE

## 2020-08-04 PROCEDURE — 74177 CT ABD & PELVIS W/CONTRAST: CPT

## 2020-08-04 PROCEDURE — 87086 URINE CULTURE/COLONY COUNT: CPT | Performed by: EMERGENCY MEDICINE

## 2020-08-04 PROCEDURE — 25000125 ZZHC RX 250: Performed by: EMERGENCY MEDICINE

## 2020-08-04 PROCEDURE — 96375 TX/PRO/DX INJ NEW DRUG ADDON: CPT

## 2020-08-04 PROCEDURE — 25000128 H RX IP 250 OP 636: Performed by: EMERGENCY MEDICINE

## 2020-08-04 PROCEDURE — 96365 THER/PROPH/DIAG IV INF INIT: CPT | Mod: 59

## 2020-08-04 PROCEDURE — U0003 INFECTIOUS AGENT DETECTION BY NUCLEIC ACID (DNA OR RNA); SEVERE ACUTE RESPIRATORY SYNDROME CORONAVIRUS 2 (SARS-COV-2) (CORONAVIRUS DISEASE [COVID-19]), AMPLIFIED PROBE TECHNIQUE, MAKING USE OF HIGH THROUGHPUT TECHNOLOGIES AS DESCRIBED BY CMS-2020-01-R: HCPCS | Performed by: EMERGENCY MEDICINE

## 2020-08-04 PROCEDURE — C9803 HOPD COVID-19 SPEC COLLECT: HCPCS

## 2020-08-04 PROCEDURE — 85025 COMPLETE CBC W/AUTO DIFF WBC: CPT | Performed by: EMERGENCY MEDICINE

## 2020-08-04 PROCEDURE — 80048 BASIC METABOLIC PNL TOTAL CA: CPT | Performed by: EMERGENCY MEDICINE

## 2020-08-04 RX ORDER — ONDANSETRON 2 MG/ML
4 INJECTION INTRAMUSCULAR; INTRAVENOUS ONCE
Status: COMPLETED | OUTPATIENT
Start: 2020-08-04 | End: 2020-08-04

## 2020-08-04 RX ORDER — KETOROLAC TROMETHAMINE 15 MG/ML
15 INJECTION, SOLUTION INTRAMUSCULAR; INTRAVENOUS ONCE
Status: COMPLETED | OUTPATIENT
Start: 2020-08-04 | End: 2020-08-04

## 2020-08-04 RX ORDER — HYDROMORPHONE HYDROCHLORIDE 1 MG/ML
0.2 INJECTION, SOLUTION INTRAMUSCULAR; INTRAVENOUS; SUBCUTANEOUS
Status: DISCONTINUED | OUTPATIENT
Start: 2020-08-04 | End: 2020-08-05 | Stop reason: HOSPADM

## 2020-08-04 RX ORDER — IOPAMIDOL 755 MG/ML
500 INJECTION, SOLUTION INTRAVASCULAR ONCE
Status: COMPLETED | OUTPATIENT
Start: 2020-08-04 | End: 2020-08-04

## 2020-08-04 RX ORDER — CIPROFLOXACIN 2 MG/ML
400 INJECTION, SOLUTION INTRAVENOUS ONCE
Status: COMPLETED | OUTPATIENT
Start: 2020-08-04 | End: 2020-08-04

## 2020-08-04 RX ADMIN — ONDANSETRON 4 MG: 2 INJECTION INTRAMUSCULAR; INTRAVENOUS at 20:48

## 2020-08-04 RX ADMIN — IOPAMIDOL 83 ML: 755 INJECTION, SOLUTION INTRAVENOUS at 21:44

## 2020-08-04 RX ADMIN — KETOROLAC TROMETHAMINE 15 MG: 15 INJECTION, SOLUTION INTRAMUSCULAR; INTRAVENOUS at 20:50

## 2020-08-04 RX ADMIN — HYDROMORPHONE HYDROCHLORIDE 0.2 MG: 1 INJECTION, SOLUTION INTRAMUSCULAR; INTRAVENOUS; SUBCUTANEOUS at 20:52

## 2020-08-04 RX ADMIN — CIPROFLOXACIN 400 MG: 2 INJECTION, SOLUTION INTRAVENOUS at 22:43

## 2020-08-04 RX ADMIN — SODIUM CHLORIDE 61 ML: 9 INJECTION, SOLUTION INTRAVENOUS at 21:44

## 2020-08-04 ASSESSMENT — MIFFLIN-ST. JEOR
SCORE: 1243.44
SCORE: 1208.51

## 2020-08-04 ASSESSMENT — ENCOUNTER SYMPTOMS
DIFFICULTY URINATING: 0
ABDOMINAL PAIN: 1
FLANK PAIN: 1
NAUSEA: 1

## 2020-08-04 NOTE — TELEPHONE ENCOUNTER
"Called about left flank and back pain (rated between 8-9/10) and started today suddenly.  States \" I might have passed a kidney stone\"  Caller denied fever, inability to urinate, difficulty breathing or chest pain.  Blanca Chavez RN  COVID 19 Nurse Triage Plan/Patient Instructions    Please be aware that novel coronavirus (COVID-19) may be circulating in the community. If you develop symptoms such as fever, cough, or SOB or if you have concerns about the presence of another infection including coronavirus (COVID-19), please contact your health care provider or visit [email or web address].     Disposition/Instructions    ED Visit recommended. Follow protocol based instructions.     Bring Your Own Device:  Please also bring your smart device(s) (smart phones, tablets, laptops) and their charging cables for your personal use and to communicate with your care team during your visit.    Thank you for taking steps to prevent the spread of this virus.  o Limit your contact with others.  o Wear a simple mask to cover your cough.  o Wash your hands well and often.    Resources    [facility]: About COVID-19: [email or web address]    CDC: What to Do If You're Sick: www.cdc.gov/coronavirus/2019-ncov/about/steps-when-sick.html    CDC: Ending Home Isolation: www.cdc.gov/coronavirus/2019-ncov/hcp/disposition-in-home-patients.html     CDC: Caring for Someone: www.cdc.gov/coronavirus/2019-ncov/if-you-are-sick/care-for-someone.html     [facility]: Interim Guidance for Hospital Discharge to Home: www.health.[facility].[place]/diseases/coronavirus/hcp/hospdischarge.pdf    [facility] clinical trials (COVID-19 research studies): clinicalaffairs.[facility]-clinical-trials     Below are the COVID-19 hotlines at the [place] [facility] ([facility]). Interpreters are available.   o For health questions: Call 772-867-4941 or 1-335.669.9691 (7 a.m. to 7 p.m.)  o For questions about schools and childcare: Call 150-614-4704 or " 7-543-471-8796 (7 a.m. to 7 p.m.)                     Reason for Disposition    [1] SEVERE back pain (e.g., excruciating) AND [2] sudden onset AND [3] age > 60    Additional Information    Negative: Passed out (i.e., lost consciousness, collapsed and was not responding)    Negative: Shock suspected (e.g., cold/pale/clammy skin, too weak to stand, low BP, rapid pulse)    Negative: Sounds like a life-threatening emergency to the triager    Negative: Major injury to the back (e.g., MVA, fall > 10 feet or 3 meters, penetrating injury, etc.)    Negative: Followed a tailbone injury    Negative: [1] Pain in the upper back over the ribs (rib cage) AND [2] radiates (travels, goes) into chest    Negative: [1] Pain in the upper back over the ribs (rib cage) AND [2] worsened by coughing (or clearly increases with breathing)    Negative: Back pain during pregnancy    Negative: Pain mainly in flank (i.e., in the side, over the lower ribs or just below the ribs)    Protocols used: BACK PAIN-A-AH

## 2020-08-05 ENCOUNTER — TELEPHONE (OUTPATIENT)
Dept: UROLOGY | Facility: CLINIC | Age: 70
End: 2020-08-05

## 2020-08-05 VITALS
SYSTOLIC BLOOD PRESSURE: 125 MMHG | WEIGHT: 160.6 LBS | DIASTOLIC BLOOD PRESSURE: 55 MMHG | OXYGEN SATURATION: 95 % | HEART RATE: 78 BPM | RESPIRATION RATE: 18 BRPM | HEIGHT: 64 IN | TEMPERATURE: 98.7 F | BODY MASS INDEX: 27.42 KG/M2

## 2020-08-05 PROBLEM — N20.0 KIDNEY STONES: Status: ACTIVE | Noted: 2020-08-05

## 2020-08-05 LAB
ANION GAP SERPL CALCULATED.3IONS-SCNC: 4 MMOL/L (ref 3–14)
BUN SERPL-MCNC: 12 MG/DL (ref 7–30)
CALCIUM SERPL-MCNC: 8.6 MG/DL (ref 8.5–10.1)
CHLORIDE SERPL-SCNC: 109 MMOL/L (ref 94–109)
CO2 SERPL-SCNC: 28 MMOL/L (ref 20–32)
CREAT SERPL-MCNC: 0.75 MG/DL (ref 0.52–1.04)
ERYTHROCYTE [DISTWIDTH] IN BLOOD BY AUTOMATED COUNT: 12.6 % (ref 10–15)
GFR SERPL CREATININE-BSD FRML MDRD: 79 ML/MIN/{1.73_M2}
GLUCOSE SERPL-MCNC: 95 MG/DL (ref 70–99)
HCT VFR BLD AUTO: 41.8 % (ref 35–47)
HGB BLD-MCNC: 13.1 G/DL (ref 11.7–15.7)
LABORATORY COMMENT REPORT: NORMAL
MCH RBC QN AUTO: 29.2 PG (ref 26.5–33)
MCHC RBC AUTO-ENTMCNC: 31.3 G/DL (ref 31.5–36.5)
MCV RBC AUTO: 93 FL (ref 78–100)
PLATELET # BLD AUTO: 202 10E9/L (ref 150–450)
POTASSIUM SERPL-SCNC: 3.3 MMOL/L (ref 3.4–5.3)
POTASSIUM SERPL-SCNC: 3.5 MMOL/L (ref 3.4–5.3)
RBC # BLD AUTO: 4.49 10E12/L (ref 3.8–5.2)
SARS-COV-2 RNA SPEC QL NAA+PROBE: NEGATIVE
SARS-COV-2 RNA SPEC QL NAA+PROBE: NORMAL
SODIUM SERPL-SCNC: 141 MMOL/L (ref 133–144)
SPECIMEN SOURCE: NORMAL
SPECIMEN SOURCE: NORMAL
WBC # BLD AUTO: 7.8 10E9/L (ref 4–11)

## 2020-08-05 PROCEDURE — 85027 COMPLETE CBC AUTOMATED: CPT | Performed by: INTERNAL MEDICINE

## 2020-08-05 PROCEDURE — 25000132 ZZH RX MED GY IP 250 OP 250 PS 637: Mod: GY | Performed by: PHYSICIAN ASSISTANT

## 2020-08-05 PROCEDURE — 84132 ASSAY OF SERUM POTASSIUM: CPT | Performed by: INTERNAL MEDICINE

## 2020-08-05 PROCEDURE — 99207 ZZC APP CREDIT; MD BILLING SHARED VISIT: CPT | Performed by: PHYSICIAN ASSISTANT

## 2020-08-05 PROCEDURE — 99235 HOSP IP/OBS SAME DATE MOD 70: CPT | Performed by: INTERNAL MEDICINE

## 2020-08-05 PROCEDURE — C9113 INJ PANTOPRAZOLE SODIUM, VIA: HCPCS | Performed by: INTERNAL MEDICINE

## 2020-08-05 PROCEDURE — 99207 ZZC CDG-CODE CATEGORY CHANGED: CPT | Performed by: INTERNAL MEDICINE

## 2020-08-05 PROCEDURE — 25800030 ZZH RX IP 258 OP 636: Performed by: INTERNAL MEDICINE

## 2020-08-05 PROCEDURE — 25000128 H RX IP 250 OP 636: Performed by: INTERNAL MEDICINE

## 2020-08-05 PROCEDURE — 80048 BASIC METABOLIC PNL TOTAL CA: CPT | Performed by: INTERNAL MEDICINE

## 2020-08-05 PROCEDURE — 25000132 ZZH RX MED GY IP 250 OP 250 PS 637: Mod: GY | Performed by: INTERNAL MEDICINE

## 2020-08-05 PROCEDURE — 36415 COLL VENOUS BLD VENIPUNCTURE: CPT | Performed by: INTERNAL MEDICINE

## 2020-08-05 PROCEDURE — 12000011 ZZH R&B MS OVERFLOW

## 2020-08-05 RX ORDER — OXYCODONE HYDROCHLORIDE 5 MG/1
5-10 TABLET ORAL EVERY 6 HOURS PRN
Qty: 12 TABLET | Refills: 0 | Status: SHIPPED | OUTPATIENT
Start: 2020-08-05 | End: 2020-08-08

## 2020-08-05 RX ORDER — PANTOPRAZOLE SODIUM 40 MG/1
40 TABLET, DELAYED RELEASE ORAL DAILY
Qty: 30 TABLET | Refills: 0 | Status: SHIPPED | OUTPATIENT
Start: 2020-08-05 | End: 2020-09-10

## 2020-08-05 RX ORDER — BISACODYL 5 MG
5 TABLET, DELAYED RELEASE (ENTERIC COATED) ORAL DAILY PRN
Status: DISCONTINUED | OUTPATIENT
Start: 2020-08-05 | End: 2020-08-05 | Stop reason: HOSPADM

## 2020-08-05 RX ORDER — POTASSIUM CL/LIDO/0.9 % NACL 10MEQ/0.1L
10 INTRAVENOUS SOLUTION, PIGGYBACK (ML) INTRAVENOUS
Status: DISCONTINUED | OUTPATIENT
Start: 2020-08-05 | End: 2020-08-05 | Stop reason: HOSPADM

## 2020-08-05 RX ORDER — BISACODYL 5 MG
10 TABLET, DELAYED RELEASE (ENTERIC COATED) ORAL DAILY PRN
Status: DISCONTINUED | OUTPATIENT
Start: 2020-08-05 | End: 2020-08-05 | Stop reason: HOSPADM

## 2020-08-05 RX ORDER — CIPROFLOXACIN 250 MG/1
250 TABLET, FILM COATED ORAL EVERY 12 HOURS SCHEDULED
Status: DISCONTINUED | OUTPATIENT
Start: 2020-08-05 | End: 2020-08-05 | Stop reason: HOSPADM

## 2020-08-05 RX ORDER — ACETAMINOPHEN 650 MG/1
650 SUPPOSITORY RECTAL EVERY 4 HOURS PRN
Status: DISCONTINUED | OUTPATIENT
Start: 2020-08-05 | End: 2020-08-05 | Stop reason: HOSPADM

## 2020-08-05 RX ORDER — POTASSIUM CHLORIDE 29.8 MG/ML
20 INJECTION INTRAVENOUS
Status: DISCONTINUED | OUTPATIENT
Start: 2020-08-05 | End: 2020-08-05 | Stop reason: HOSPADM

## 2020-08-05 RX ORDER — HYDROMORPHONE HYDROCHLORIDE 1 MG/ML
0.2 INJECTION, SOLUTION INTRAMUSCULAR; INTRAVENOUS; SUBCUTANEOUS
Status: DISCONTINUED | OUTPATIENT
Start: 2020-08-05 | End: 2020-08-05 | Stop reason: HOSPADM

## 2020-08-05 RX ORDER — CIPROFLOXACIN 250 MG/1
250 TABLET, FILM COATED ORAL EVERY 12 HOURS
Qty: 4 TABLET | Refills: 0 | Status: SHIPPED | OUTPATIENT
Start: 2020-08-05 | End: 2021-12-07

## 2020-08-05 RX ORDER — POTASSIUM CHLORIDE 7.45 MG/ML
10 INJECTION INTRAVENOUS
Status: DISCONTINUED | OUTPATIENT
Start: 2020-08-05 | End: 2020-08-05 | Stop reason: HOSPADM

## 2020-08-05 RX ORDER — PROCHLORPERAZINE MALEATE 5 MG
5 TABLET ORAL EVERY 6 HOURS PRN
Status: DISCONTINUED | OUTPATIENT
Start: 2020-08-05 | End: 2020-08-05 | Stop reason: HOSPADM

## 2020-08-05 RX ORDER — CIPROFLOXACIN 2 MG/ML
400 INJECTION, SOLUTION INTRAVENOUS EVERY 12 HOURS
Status: DISCONTINUED | OUTPATIENT
Start: 2020-08-05 | End: 2020-08-05

## 2020-08-05 RX ORDER — ONDANSETRON 4 MG/1
4 TABLET, ORALLY DISINTEGRATING ORAL EVERY 6 HOURS PRN
Qty: 15 TABLET | Refills: 0 | Status: SHIPPED | OUTPATIENT
Start: 2020-08-05 | End: 2020-11-02

## 2020-08-05 RX ORDER — ACETAMINOPHEN 325 MG/1
650 TABLET ORAL EVERY 4 HOURS PRN
Status: DISCONTINUED | OUTPATIENT
Start: 2020-08-05 | End: 2020-08-05 | Stop reason: HOSPADM

## 2020-08-05 RX ORDER — AMOXICILLIN 250 MG
2 CAPSULE ORAL 2 TIMES DAILY
Status: DISCONTINUED | OUTPATIENT
Start: 2020-08-05 | End: 2020-08-05 | Stop reason: HOSPADM

## 2020-08-05 RX ORDER — POTASSIUM CHLORIDE 1.5 G/1.58G
20-40 POWDER, FOR SOLUTION ORAL
Status: DISCONTINUED | OUTPATIENT
Start: 2020-08-05 | End: 2020-08-05 | Stop reason: HOSPADM

## 2020-08-05 RX ORDER — ALBUTEROL SULFATE 90 UG/1
1-2 AEROSOL, METERED RESPIRATORY (INHALATION) EVERY 4 HOURS PRN
Status: DISCONTINUED | OUTPATIENT
Start: 2020-08-05 | End: 2020-08-05 | Stop reason: HOSPADM

## 2020-08-05 RX ORDER — ONDANSETRON 4 MG/1
4 TABLET, ORALLY DISINTEGRATING ORAL EVERY 6 HOURS PRN
Status: DISCONTINUED | OUTPATIENT
Start: 2020-08-05 | End: 2020-08-05 | Stop reason: HOSPADM

## 2020-08-05 RX ORDER — AMOXICILLIN 250 MG
1 CAPSULE ORAL 2 TIMES DAILY
Status: DISCONTINUED | OUTPATIENT
Start: 2020-08-05 | End: 2020-08-05 | Stop reason: HOSPADM

## 2020-08-05 RX ORDER — TAMSULOSIN HYDROCHLORIDE 0.4 MG/1
0.4 CAPSULE ORAL DAILY
Qty: 14 CAPSULE | Refills: 0 | Status: SHIPPED | OUTPATIENT
Start: 2020-08-05 | End: 2020-09-10

## 2020-08-05 RX ORDER — PROCHLORPERAZINE 25 MG
12.5 SUPPOSITORY, RECTAL RECTAL EVERY 12 HOURS PRN
Status: DISCONTINUED | OUTPATIENT
Start: 2020-08-05 | End: 2020-08-05 | Stop reason: HOSPADM

## 2020-08-05 RX ORDER — LIDOCAINE 40 MG/G
CREAM TOPICAL
Status: DISCONTINUED | OUTPATIENT
Start: 2020-08-05 | End: 2020-08-05 | Stop reason: HOSPADM

## 2020-08-05 RX ORDER — BISACODYL 5 MG
15 TABLET, DELAYED RELEASE (ENTERIC COATED) ORAL DAILY PRN
Status: DISCONTINUED | OUTPATIENT
Start: 2020-08-05 | End: 2020-08-05 | Stop reason: HOSPADM

## 2020-08-05 RX ORDER — SODIUM CHLORIDE 9 MG/ML
INJECTION, SOLUTION INTRAVENOUS CONTINUOUS
Status: DISCONTINUED | OUTPATIENT
Start: 2020-08-05 | End: 2020-08-05

## 2020-08-05 RX ORDER — SODIUM CHLORIDE, SODIUM LACTATE, POTASSIUM CHLORIDE, CALCIUM CHLORIDE 600; 310; 30; 20 MG/100ML; MG/100ML; MG/100ML; MG/100ML
INJECTION, SOLUTION INTRAVENOUS CONTINUOUS
Status: DISCONTINUED | OUTPATIENT
Start: 2020-08-05 | End: 2020-08-05 | Stop reason: HOSPADM

## 2020-08-05 RX ORDER — ONDANSETRON 2 MG/ML
4 INJECTION INTRAMUSCULAR; INTRAVENOUS EVERY 6 HOURS PRN
Status: DISCONTINUED | OUTPATIENT
Start: 2020-08-05 | End: 2020-08-05 | Stop reason: HOSPADM

## 2020-08-05 RX ORDER — NALOXONE HYDROCHLORIDE 0.4 MG/ML
.1-.4 INJECTION, SOLUTION INTRAMUSCULAR; INTRAVENOUS; SUBCUTANEOUS
Status: DISCONTINUED | OUTPATIENT
Start: 2020-08-05 | End: 2020-08-05 | Stop reason: HOSPADM

## 2020-08-05 RX ORDER — ACETAMINOPHEN 325 MG/1
650 TABLET ORAL EVERY 4 HOURS PRN
COMMUNITY
Start: 2020-08-05 | End: 2020-10-21

## 2020-08-05 RX ORDER — POLYETHYLENE GLYCOL 3350 17 G/17G
17 POWDER, FOR SOLUTION ORAL DAILY PRN
Status: DISCONTINUED | OUTPATIENT
Start: 2020-08-05 | End: 2020-08-05 | Stop reason: HOSPADM

## 2020-08-05 RX ORDER — LORAZEPAM 2 MG/ML
.5-1 INJECTION INTRAMUSCULAR EVERY 4 HOURS PRN
Status: DISCONTINUED | OUTPATIENT
Start: 2020-08-05 | End: 2020-08-05 | Stop reason: HOSPADM

## 2020-08-05 RX ORDER — BISACODYL 10 MG
10 SUPPOSITORY, RECTAL RECTAL DAILY PRN
Status: DISCONTINUED | OUTPATIENT
Start: 2020-08-05 | End: 2020-08-05 | Stop reason: HOSPADM

## 2020-08-05 RX ORDER — POTASSIUM CHLORIDE 1500 MG/1
20-40 TABLET, EXTENDED RELEASE ORAL
Status: DISCONTINUED | OUTPATIENT
Start: 2020-08-05 | End: 2020-08-05 | Stop reason: HOSPADM

## 2020-08-05 RX ADMIN — PANTOPRAZOLE SODIUM 40 MG: 40 INJECTION, POWDER, FOR SOLUTION INTRAVENOUS at 07:51

## 2020-08-05 RX ADMIN — POTASSIUM CHLORIDE 40 MEQ: 1500 TABLET, EXTENDED RELEASE ORAL at 08:03

## 2020-08-05 RX ADMIN — SODIUM CHLORIDE, POTASSIUM CHLORIDE, SODIUM LACTATE AND CALCIUM CHLORIDE: 600; 310; 30; 20 INJECTION, SOLUTION INTRAVENOUS at 00:53

## 2020-08-05 RX ADMIN — CIPROFLOXACIN HYDROCHLORIDE 250 MG: 250 TABLET, FILM COATED ORAL at 09:41

## 2020-08-05 ASSESSMENT — MIFFLIN-ST. JEOR: SCORE: 1223.48

## 2020-08-05 NOTE — H&P
Admitted:     08/04/2020      PRIMARY CARE PHYSICIAN:  Robby Sue MD      CHIEF COMPLAINT:  Left flank pain, bilateral lower abdominal pain.      HISTORY OF PRESENT ILLNESS:  Carola Cox is a 70-year-old  female with remote history of kidney stones many years ago, who presented to [facility] with left-sided flank and bilateral lower abdominal pain.  The patient started having some mild lower abdominal pain symptoms yesterday.  Today, the patient's pain got significantly worse and was radiating back into the left side of her flank.  She denies any other infectious symptoms, no fever, no vomiting, no urinary hesitancy, urgency or frequency.  The patient came to [facility] for further assessment.      In the Emergency Department, the patient was afebrile, slightly hypertensive.  Blood work revealed sodium 136.  Potassium was low at 3.3, BUN 18, creatinine 0.8 with calculated GFR 64, glucose 110.  White count 12.6 and elevated, hemoglobin 15.2, platelets of 243.  The patient has a left shift.  Urinalysis specific gravity, 1.014, with 15 white cells, 35 red cells and 20 of protein.  The patient had CT of the abdomen and pelvis.  This revealed mild left-sided hydronephrosis.  No definite obstructing stones seen, but could have passed.  There is mild increased left ureteral wall enhancement.  On the right side, there is no hydronephrosis; however, there is a 2 mm nonobstructing stone in the distal right ureter.  There is a larger 2.7 cm blanching stone in the upper pole of the right kidney.  No acute bowel findings noted.  This case was discussed by the ED physician with the fellow on-call for [facility].  The patient was given IV ciprofloxacin.  She received Dilaudid for pain.  The patient is to be n.p.o. after midnight for possible stenting in the morning.  The patient does not appear to be septic now and is hemodynamically stable.      PAST  MEDICAL HISTORY:   1.  Atrophic vaginitis.   2.  BPPV.   3.  Cardiac dysrhythmia.   4.  Endometriosis.   5.  General anxiety disorder.   6.  Post-menopause on hormone replacement therapy.   7.  Hyperlipidemia.   8.  Osteoporosis.   9.  GERD.   10.  Asthma.     11.  Goiter.   12.  Remote history of kidney stones.      PAST SURGICAL HISTORY:     1.  Hysterectomy and salpingo-oophorectomy.   2.  Lithotripsy.      FAMILY HISTORY:  Father with hypertension and hyperlipidemia.  Sister with thyroid disease.      SOCIAL HISTORY:  The patient is .  No alcohol, former smoker.  Full code.      ALLERGIES:  PENICILLINS, CEFPROZIL.      CURRENT MEDICATIONS:   1.  Hydroxyzine.   2.  Antivert.   3.  Simvastatin.   4.  Albuterol.   5.  Vitamin D.   6.  Pepcid.      REVIEW OF SYSTEMS:  Ten points reviewed and as dictated in the History of Present Illness.      PHYSICAL EXAMINATION:   VITAL SIGNS:  Temperature 97.9, heart rate 78, respirations 16, blood pressure 161/68, saturations 99% on room air.   GENERAL:  The patient is a nonseptic appearing 70-year-old  female.  She is pleasant, cooperative, in no distress other than some mild discomfort.   HEENT:  Pupils equal.  Sclerae are anicteric.  Mucous membranes are moist.   NECK:  JVP is not elevated.   CHEST:  Clear to auscultation.   CARDIOVASCULAR:  S1, S2, regular rate and rhythm.   ABDOMEN:  Obese.  The patient has mild lower abdominal tenderness and mild left flank tenderness.  There is no guarding or rebound.  Normoactive bowel sounds.   MUSCULOSKELETAL:  No clubbing, cyanosis or edema.   NEUROLOGIC:  She is grossly nonfocal.  Cranial nerves grossly intact.   SKIN:  Warm, dry, well perfused.   PSYCHIATRIC:  Mood and affect stable.      LABORATORY DATA:  As dictated in the History of Present Illness.      ASSESSMENT:  Carola Cox is 70, with remote history of kidney stones and lithotripsy, now presents with bilateral kidney stones, some left hydronephrosis and  fat stranding, but no evidence of stone.  On the left, there is a 2 mm stone in the distal ureter.  There is nonobstructing, but has a larger stone of 2.7 cm on the right side, who has numerous other stones, is being admitted for further treatment.      PLAN:   1.  Bilateral kidney stones with fat stranding on the left and some mild hydronephrosis as well as a nonobstructing right-sided kidney stone:  The patient is being admitted as inpatient.  The patient will be given IV fluids.  The patient received IV Cipro.  The patient will be made n.p.o. after midnight.  The patient will receive Dilaudid for pain and Zofran for nausea.  The patient will likely undergo cystoscopy and stent placement and possible laser therapy, either now or in the future.   2.  Reflux disease:  We will continue the patient on IV PPI.   3.  History of asthma:  We will make the patient's inhalers including albuterol available.   4.  Hyperlipidemia:  We will hold the patient's Zocor.   5.  History of anxiety:  We will make IV hydralazine available.      CODE STATUS:  Full.      DEEP VENOUS THROMBOSIS PROPHYLAXIS:  The patient will receive compression boots.         JENNY JORDAN MD             D: 2020   T: 2020   MT: JORGE      Name:     DESMOND SWENSON   MRN:      [id number]        Account:      [id number]   :      1950        Admitted:     2020                   Document: [id number]       cc: Robby Sue MD

## 2020-08-05 NOTE — PHARMACY-ADMISSION MEDICATION HISTORY
Admission medication history interview status for this patient is complete. See [facility] admission navigator for allergy information, prior to admission medications and immunization status.     Medication history interview done via telephone during Covid-19 pandemic, indicate source(s): Patient  Medication history resources (including written lists, pill bottles, clinic record):patient    Changes made to PTA medication list:  Added: None  Deleted: None  Changed: None    Actions taken by pharmacist (provider contacted, etc):None     Additional medication history information:None    Medication reconciliation/reorder completed by provider prior to medication history?  Y   (Y/N)     Prior to Admission medications    Medication Sig Last Dose Taking? Auth Provider   Cholecalciferol (VITAMIN D PO) Take 2,000 Units by mouth daily  8/3/2020 at AM Yes Reported, Patient   famotidine (PEPCID) 10 MG tablet Take 10 mg by mouth nightly as needed  at PRN Yes Reported, Patient   hydrOXYzine (ATARAX) 25 MG tablet Take 0.5-1 tablets (12.5-25 mg) by mouth every 12 hours as needed for anxiety  at PRN Yes Leann Sue MD   meclizine (ANTIVERT) 25 MG tablet Take 1 tablet (25 mg) by mouth 3 times daily as needed for dizziness  at PRN Yes Leann Sue MD   simvastatin (ZOCOR) 20 MG tablet TAKE 1 TABLET BY MOUTH AT BEDTIME 8/3/2020 at AM Yes Leann Sue MD   VENTOLIN  (90 Base) MCG/ACT Inhaler INHALE 2 PUFFS INTO THE LUNGS EVERY 6 HOURS  Patient taking differently: as needed   at PRN Yes Leann Sue MD

## 2020-08-05 NOTE — CONSULTS
[facility] Consultation by [facility]    Carola Cox MRN# [id number]   Age: 70 year old YOB: 1950     Date of Admission:  8/4/2020    Reason for consult: Renal colic       Requesting PA/MD: Dr. Pike       Level of consult: Consult, follow and place orders           Assessment and Plan:   Assessment:   Renal stones, right ureteral stone, probable passage of left ureteral stone      Plan:   Ok for diet  Send home with strainer, Flomax and short course of narcotic  Will arrange virtual visit with Dr. Clark to discuss definitive stone management (right PCNL vs ESWL).  Neph referral as outpt for metabolic analysis.   Reviewed with ERICA.    Evelyn Potter PA-C  [facility]  [phone]               Chief Complaint:   Left lower abdominal     History is obtained from the patient and EMR.         History of Present Illness:   This patient is a 70 year old female admitted with left flank pain and lower abdominal pain. Hx of stones 50 years ago and never required intervention. CT in ED revealed 2.7cm right renal stone, 9mm left renal stone (and 4 others), 2mm right distal ureteral stone and mild left hydro.    UA 35 RBCs, 15 WBCs, nitrite neg, WBC 7.8, Cr 0.75, Ca 8.6. Feeling better this AM. No fevers, chills or gross hematuria.               Past Medical History:     Past Medical History:   Diagnosis Date     Atrophic vaginitis      BPPV (benign paroxysmal positional vertigo)      Cardiac dysrhythmia     beta blocker for this in past     Endometriosis of pelvic peritoneum      Generalized anxiety disorder 2014    cannot tolerate SSRIs. Trial of 12.5mg sertraline and had severe nausea, diarrhea, dizziness. low dose propranolol prn and counseling      History of postmenopausal HRT      Hyperlipidemia      Osteoporosis              Past Surgical History:     Past Surgical History:   Procedure Laterality Date     HYSTERECTOMY TOTAL ABDOMINAL, BILATERAL SALPINGO-OOPHORECTOMY, COMBINED       Endometriosis.     LITHOTRIPSY               Social History:     Social History     Tobacco Use     Smoking status: Former Smoker     Last attempt to quit: 1979     Years since quittin.6     Smokeless tobacco: Never Used   Substance Use Topics     Alcohol use: Not Currently     Alcohol/week: 0.0 standard drinks             Family History:     Family History   Problem Relation Age of Onset     Hyperlipidemia Father      Hypertension Father      Breast Cancer Maternal Grandmother      Heart Disease Other         Family History     Thyroid Disease Sister      Family history reviewed.             Allergies:     Allergies   Allergen Reactions     Cefprozil Hives     Cefzil     Penicillin V              Medications:     Current Facility-Administered Medications   Medication     acetaminophen (TYLENOL) Suppository 650 mg     acetaminophen (TYLENOL) tablet 650 mg     albuterol (PROAIR HFA/PROVENTIL HFA/VENTOLIN HFA) 108 (90 Base) MCG/ACT inhaler 1-2 puff     bisacodyl (DULCOLAX) EC tablet 5 mg    Or     bisacodyl (DULCOLAX) EC tablet 10 mg    Or     bisacodyl (DULCOLAX) EC tablet 15 mg     bisacodyl (DULCOLAX) Suppository 10 mg     ciprofloxacin (CIPRO) tablet 250 mg     HYDROmorphone (PF) (DILAUDID) injection 0.2 mg     HYDROmorphone (PF) (DILAUDID) injection 0.2 mg     lactated ringers infusion     lidocaine (LMX4) cream     lidocaine 1 % 0.1-1 mL     LORazepam (ATIVAN) injection 0.5-1 mg     melatonin tablet 1 mg     naloxone (NARCAN) injection 0.1-0.4 mg     ondansetron (ZOFRAN-ODT) ODT tab 4 mg    Or     ondansetron (ZOFRAN) injection 4 mg     pantoprazole (PROTONIX) 40 mg IV push injection     polyethylene glycol (MIRALAX) Packet 17 g     potassium chloride (KLOR-CON) Packet 20-40 mEq     potassium chloride 10 mEq in 100 mL intermittent infusion with 10 mg lidocaine     potassium chloride 10 mEq in 100 mL sterile water intermittent infusion (premix)     potassium chloride 20 mEq in 50 mL intermittent  "infusion     potassium chloride ER (KLOR-CON M) CR tablet 20-40 mEq     prochlorperazine (COMPAZINE) injection 5 mg    Or     prochlorperazine (COMPAZINE) tablet 5 mg    Or     prochlorperazine (COMPAZINE) Suppository 12.5 mg     senna-docusate (SENOKOT-S/PERICOLACE) 8.6-50 MG per tablet 1 tablet    Or     senna-docusate (SENOKOT-S/PERICOLACE) 8.6-50 MG per tablet 2 tablet     sodium chloride (PF) 0.9% PF flush 3 mL     sodium chloride (PF) 0.9% PF flush 3 mL             Review of Systems:   A comprehensive 10-point review of systems was performed and found to be negative except as described in the HPI.     /59 (BP Location: Left arm)   Pulse 78   Temp 98.2  F (36.8  C) (Oral)   Resp 16   Ht 1.626 m (5' 4\")   Wt 72.8 kg (160 lb 9.6 oz)   SpO2 95%   BMI 27.57 kg/m    PSYCH: NAD  EYES: EOMI  MOUTH: MMM  NECK: Supple, no notable adenopathy  RESP: Unlabored breathing  CARDIAC: No LE edema  SKIN: Warm, no rashes  ABD: soft, Nontender  NEURO: AAO x3            Data:     Lab Results   Component Value Date    WBC 7.8 08/05/2020    HGB 13.1 08/05/2020    HCT 41.8 08/05/2020    MCV 93 08/05/2020     08/05/2020     Lab Results   Component Value Date    CR 0.75 08/05/2020     EXAM: CT ABDOMEN PELVIS W CONTRAST  LOCATION: [facility]  DATE/TIME: 8/4/2020 9:42 PM     INDICATION: Left lower quadrant pain.  COMPARISON: None.  TECHNIQUE: CT scan of the abdomen and pelvis was performed following injection of IV contrast. Multiplanar reformats were obtained. Dose reduction techniques were used.  CONTRAST: 83 mL Isovue-370.     FINDINGS:   LOWER CHEST: Indeterminate 4 mm nodule right lower lobe on series 3 image 2.     HEPATOBILIARY: Normal.     PANCREAS: Normal.     SPLEEN: Normal.     ADRENAL GLANDS: Normal.     KIDNEYS/BLADDER: There is a branching nonobstructing 2.7 cm stone in the upper pole of the right kidney. There are 5 nonobstructing stones in the lower pole of the left kidney with the largest " stone measuring 9 mm. There are a few small benign bilateral   renal cysts. Kidneys are otherwise negative. Mild right-sided hydronephrosis is present with some mild increase left ureteral wall enhancement. This is of indeterminate etiology with a definite obstructing process not identified. No radiopaque   obstructing stones are seen. No hydronephrosis on the right. However, there is a 2 mm stone in the distal right ureter just above the UVJ which does not result in significant obstruction. Bladder is grossly negative.     BOWEL: Normal.     LYMPH NODES: Normal.     VASCULATURE: Unremarkable.     PELVIC ORGANS: Hysterectomy.     MUSCULOSKELETAL: Normal.                                                                      IMPRESSION:   1.  Mild left-sided hydronephrosis. No definite obstructing stone is seen. There is some mild increased left ureteral wall enhancement. Findings are of indeterminate etiology but may account for the patient's symptoms. This could potentially be seen with   a recently passed stone although obstructing etiology not well visualized on CT not excluded. Clinical correlation.     2.  There is no hydronephrosis on the right. However, there is a 2 mm nonobstructing stone seen in the distal right ureter.

## 2020-08-05 NOTE — ED TRIAGE NOTES
Left sided flank pain that started yesterday as abdominal pain and today moved into her back. History of kidney stones. Did not notice blood in her urine.    Denies fevers, cough or shortness of breath.

## 2020-08-05 NOTE — ED NOTES
"[facility]  ED Nurse Handoff Report    Carola Cox is a 70 year old female   ED Chief complaint: Flank Pain  . ED Diagnosis:   Final diagnoses:   Bilateral kidney stones     Allergies:   Allergies   Allergen Reactions     Cefprozil Hives     Cefzil     Penicillin V        Code Status: Not discussed  Activity level - Baseline/Home:  Independent. Activity Level - Current:   Independent. Lift room needed: No. Bariatric: No   Needed: No   Isolation: No. Infection: Not Applicable.     Vital Signs:   Vitals:    08/04/20 1954 08/04/20 2100   BP: (!) 185/110 (!) 153/79   Pulse:  79   Resp: 16    Temp: 98.7  F (37.1  C)    TempSrc: Temporal    SpO2: 99% 96%   Weight: 74.8 kg (165 lb)    Height: 1.626 m (5' 4\")        Cardiac Rhythm:  ,      Pain level: 0-10 Pain Scale: 3  Patient confused: No. Patient Falls Risk: Yes.   Elimination Status: Has voided   Patient Report - Initial Complaint: flank pain. Focused Assessment:  history of kidney stones who presents to the emergency department for evaluation of flank pain. The patient reports that yesterday she began having some abdominal pain that she initially thought was gas. She took Gas-X yesterday and notes that today her pain was improved, but intermittent. However, early this evening she began having greater pain that radiated to her flank and back. She also mentions some nausea. The patient has a history of kidney stones and states that her pain today is similar to this. She does note that when she previously had kidney stones, she experienced some difficulty urinating, but she has not had this during her current episode. She denies any difficulty urinating.     Genitourinary - Genitourinary WDL: -WDL except; urine  Urine Characteristics: clear yellow  Genitourinary Comment: left sided flank pain, constant, dull pain 8/10.   Gastrointestinal - GI WDL: GI symptoms; nausea and vomiting  Nausea/Vomiting Signs/Symptoms: nausea intermittent  All " Quadrants Bowel Sounds: audible and active in all quadrants  Last Bowel Movement: 08/04/20  GI Signs/Symptoms: abdominal pain (lower abdomen)  Tests Performed: labs, CT. Abnormal Results:   Labs Ordered and Resulted from Time of ED Arrival Up to the Time of Departure from the ED   BASIC METABOLIC PANEL - Abnormal; Notable for the following components:       Result Value    Potassium 3.3 (*)     Glucose 110 (*)     All other components within normal limits   ROUTINE UA WITH MICROSCOPIC - Abnormal; Notable for the following components:    Blood Urine Moderate (*)     Protein Albumin Urine 20 (*)     Leukocyte Esterase Urine Moderate (*)     WBC Urine 15 (*)     RBC Urine 35 (*)     Bacteria Urine Few (*)     Squamous Epithelial /HPF Urine 2 (*)     Mucous Urine Present (*)     All other components within normal limits   CBC WITH PLATELETS DIFFERENTIAL - Abnormal; Notable for the following components:    WBC 12.6 (*)     Hematocrit 48.3 (*)     Absolute Neutrophil 10.2 (*)     All other components within normal limits   URINE CULTURE AEROBIC BACTERIAL     Abd/pelvis CT,  IV  contrast only TRAUMA / AAA   Final Result   IMPRESSION:    1.  Mild left-sided hydronephrosis. No definite obstructing stone is seen. There is some mild increased left ureteral wall enhancement. Findings are of indeterminate etiology but may account for the patient's symptoms. This could potentially be seen with    a recently passed stone although obstructing etiology not well visualized on CT not excluded. Clinical correlation.      2.  There is no hydronephrosis on the right. However, there is a 2 mm nonobstructing stone seen in the distal right ureter.      3.  Nonobstructing intrarenal calculi bilaterally including a 2.7 cm branching stone in the upper pole of the right kidney.      4.  Recommend urologic consultation for follow-up. Given obstruction on the left without a definite cause, further evaluation is required which could include CT  using the hematuria protocol or retrograde pyelograms to exclude an obstructing lesion on the    left not well visualized on CT.      5.  No significant findings elsewhere. No acute bowel findings.      6.  Technically indeterminate 4 mm nodule right lower lobe. Follow-up as per Fleischner criteria outlined below.      Fleischner Society Recommendations for Pulmonary Nodules      Nodule size less than 6 mm:       Nodules < 6 mm do not require routine follow-up, but certain patients at high risk with suspicious nodule morphology, upper lobe location, or both may warrant 12-month follow-up.              .   Treatments provided: See MAR  Family Comments: NA  OBS brochure/video discussed/provided to patient:  Yes  ED Medications:   Medications   HYDROmorphone (PF) (DILAUDID) injection 0.2 mg (0.2 mg Intravenous Given 8/4/20 2052)   ciprofloxacin (CIPRO) infusion 400 mg (400 mg Intravenous New Bag 8/4/20 2243)   ondansetron (ZOFRAN) injection 4 mg (4 mg Intravenous Given 8/4/20 2048)   ketorolac (TORADOL) injection 15 mg (15 mg Intravenous Given 8/4/20 2050)   CT Scan Flush (61 mLs Intravenous Given 8/4/20 2144)   iopamidol (ISOVUE-370) solution 500 mL (83 mLs Intravenous Given 8/4/20 2144)     Drips infusing:  Yes  For the majority of the shift, the patient's behavior Green. Interventions performed were NA.    Sepsis treatment initiated: No       ED Nurse Name/Phone Number: Obie Irvin RN,   10:50 PM  RECEIVING UNIT ED HANDOFF REVIEW    Above ED Nurse Handoff Report was reviewed: Yes  Reviewed by: Mirta Ventura RN on August 4, 2020 at 11:42 PM

## 2020-08-05 NOTE — ED PROVIDER NOTES
History     Chief Complaint:  Flank Pain    HPI   Carola Cox is a 70 year old female with a history of kidney stones who presents to the emergency department for evaluation of flank and abdominal pain. The patient reports that yesterday she began having some abdominal pain that she initially thought was gas. She took Gas-X yesterday and notes that today her pain was improved, but intermittent. However, early this evening she began having greater pain that radiated to her flank and back. She also mentions some nausea. The patient has a history of kidney stones and states that her pain today is similar to this. She does note that when she previously had kidney stones, she experienced some difficulty urinating, but she has not had this during her current episode. She denies any hematuria, dysuria, frequency, vomiting, and diarrhea.     Allergies:  Cefprozil  Penicillin V    Medications:    Pepcid  Atarax  Antivert  Zocor  Ventolin    Past Medical History:    Atrophic vaginitis  Benign paroxysmal positional vertigo  Cardiac dysrhythmia  Endometriosis  Generalized anxiety disorder  Postmenopausal HRT  Hyperlipidemia  Osteoporosis  GERD  Asthma  Goiter  Kidney stones    Past Surgical History:    Hysterectomy and Salpingo-oophorectomy  Lithotripsy    Family History:    Father: hyperlipidemia, hypertension  Sister: thyroid disease    Social History:  The patient presents alone  Smoking Status: Former smoker  Smokeless Tobacco: Never  Alcohol Use: Not currently  Drug Use: No  Marital Status:        Review of Systems   Gastrointestinal: Positive for abdominal pain and nausea.   Genitourinary: Positive for flank pain. Negative for difficulty urinating.   All other systems reviewed and are negative.        Physical Exam   Vitals:  Patient Vitals for the past 24 hrs:   BP Temp Temp src Pulse Heart Rate Resp SpO2 Height Weight   08/04/20 2245 (!) 155/80 -- -- 78 -- -- 100 % -- --   08/04/20 2230 (!) 150/69 -- --  "82 -- -- 99 % -- --   08/04/20 2100 (!) 153/79 -- -- 79 -- -- 96 % -- --   08/04/20 1954 (!) 185/110 98.7  F (37.1  C) Temporal -- 87 16 99 % 1.626 m (5' 4\") 74.8 kg (165 lb)       Physical Exam     Nursing note and vitals reviewed.  Constitutional: Cooperative.   HENT:   Mouth/Throat: Moist mucous membranes.   Eyes: EOMI, nonicteric sclera  Cardiovascular: Normal rate, regular rhythm, no murmurs, rubs, or gallops  Pulmonary/Chest: Effort normal and breath sounds normal. No respiratory distress. No wheezes. No rales.   Abdominal: Soft. Mild LLQ and left flank tenderness , nondistended, no guarding or rigidity.   Musculoskeletal: Normal range of motion.   Neurological: Alert. Moves all extremities spontaneously.   Skin: Skin is warm and dry. No rash noted.   Psychiatric: Normal mood and affect.      Emergency Department Course     Imaging:  Radiographic findings were communicated with the patient who voiced understanding of the findings.    CT Abd/Pelvis IV Contrast only Trauma/AAA  1.  Mild left-sided hydronephrosis. No definite obstructing stone is seen. There is some mild increased left ureteral wall enhancement. Findings are of indeterminate etiology but may account for the patient's symptoms. This could potentially be seen with a recently passed stone although obstructing etiology not well visualized on CT not excluded. Clinical correlation.     2.  There is no hydronephrosis on the right. However, there is a 2 mm nonobstructing stone seen in the distal right ureter.     3.  Nonobstructing intrarenal calculi bilaterally including a 2.7 cm branching stone in the upper pole of the right kidney.     4.  Recommend urologic consultation for follow-up. Given obstruction on the left without a definite cause, further evaluation is required which could include CT using the hematuria protocol or retrograde pyelograms to exclude an obstructing lesion on the left not well visualized on CT.     5.  No significant findings " elsewhere. No acute bowel findings.     6.  Technically indeterminate 4 mm nodule right lower lobe. Follow-up as per Fleischner criteria outlined below.  Reading per radiology.      Laboratory:  CBC: WBC 12.6 (H) o/w WNL. (HGB 15.2, )    BMP: Potassium 3.3 (L), Glucose 110 (H) o/w AWNL (Creatinine 0.89)    UA with micro: Blood moderate, Protein Albumin 20, Leukocyte Esterase moderate, WBC 15 (H), RBC 35 (H), Bacteria few, Squamous Epithelial 2 (H), Mucous present o/w negative     Urine Culture: Pending  Asymptomatic Covid-19 Virus by PCR: Pending    Interventions:  2048 Zofran 4 mg IV  2050 Toradol 15 mg IV  2052 Dilaudid 0.2 mg IV  2243 Cipro 400 mg IV    Emergency Department Course:  Past medical records, nursing notes, and vitals reviewed.    2035 I performed an exam of the patient as documented above.     IV was inserted and blood was drawn for laboratory testing, results above.  The patient provided a urine sample here in the emergency department. This was sent for laboratory testing, findings above.  The patient was sent for a abdomen/pelvis CT while in the emergency department, results above.     2215 I rechecked and updated the patient.     2226 I spoke with Dr. Cook, urology, regarding the patient.     2233 I rechecked the patient and discussed the results of her workup thus far.     2316 I spoke with Dr. Pike, hospitalist, who agreed to admit the patient.     Findings and plan explained to the Patient who consents to admission. Discussed the patient with Dr. Pike, who will admit the patient to a medical bed for further monitoring, evaluation, and treatment.    I personally reviewed the laboratory results with the Patient and answered all related questions prior to admission.      Impression & Plan      Covid-19  Carola Cox was evaluated during a global COVID-19 pandemic, which necessitated consideration that the patient might be at risk for infection with the SARS-CoV-2 virus that  causes COVID-19.   Applicable protocols for evaluation were followed during the patient's care.   COVID-19 was considered as part of the patient's evaluation. The plan for testing is:  a test was obtained during this visit.    Medical Decision Making:  Patient presents with chief complaint of left-sided abdominal and flank pain.  Patient with history of kidney stones, though she feels that this is different than her history therefore imaging obtained.  Imaging does show findings consistent with kidney stones, however a specific stone is not visualized on the left, and surprisingly patient has a stone in her distal ureter on the right.  By my read on the CT, there is mild hydronephrosis on the right as well.  Given bilateral obstructing stones, I contacted urology who recommended patient be brought into the hospital overnight with plan for possible stenting in the morning.  Patient does have some white cells in her urine in addition to a mildly elevated white blood cell count.  We will cover her with Cipro in case this represents infection.  I think that she is low risk for this as she has had no infectious symptoms.  All of the above was discussed with her at bedside and she voices understanding and agreement.  Dr. Pike from our hospitalist service accepts patient for admission.  All of her questions were answered.    Diagnosis:    ICD-10-CM    1. Bilateral kidney stones  N20.0        Disposition:  Admitted to the hospital under the care of Jung Acosta, am serving as a scribe on 8/4/2020 at 8:20 PM to personally document services performed by Kenan Mccullough MD based on my observations and the provider's statements to me.   Jung Espinoza  8/4/2020   [facility] EMERGENCY DEPARTMENT       Kenan Mccullough MD  08/05/20 [id number]

## 2020-08-05 NOTE — DISCHARGE SUMMARY
[facility] Outpatient / Observation Unit  Discharge Summary        Carola Cox MRN# [id number]   YOB: 1950 Age: 70 year old     Date of Admission:  8/4/2020  Date of Discharge:  8/5/2020  Admitting Physician:  Fili Pike MD  Discharge Physician: Dia Gonzalez PA-C  Discharging Service: Hospitalist      Primary Provider: Leann Sue  Primary Care Physician Phone Number: [phone]         Primary Discharge Diagnoses:    Carola Cox is a 70-year-old with PMH significant for remote history of kidney stones, BPPV, cardiac dysrhythmia, AIDA, HLD, GERD, and asthma who was admitted on 8/4/20 for left-sided flank and bilateral lower abdominal pain    #Bilateral kidney stones: presented with acute onset of left sided flank pain with CT scan showing 5 nonobstructing stones in the lower pole of the left kidney with the largest stone measuring 9 mm, 2 mm nonobstructing stone of the distal right ureter, and nonobstructing intrarenal calculi bilaterally including a 2.7 cm branching stone in the upper pole of the right kidney. Suspect initial pain was related to a stone the patient has since passed since she has no pain the day of discharge. Urology consulted and felt patient could discharge with being sent home with bibiana and will make arrangements for outpatient follow up with Dr. Clark to discuss definitive stone management as outpatient. Patient should also have nephrology metabolic evaluation due to recurrent kidney stones.     #UTI: UA shows concern for mild infection with moderate LE and blood, but negative nitrites. Initially treated with IV Ciprofloxacin, will have patient complete 2 additional days at discharge. Afebrile but mild leukocytosis of 12.6 that resolved prior to discharge.     #Hypokalemia: initial potassium of 3.3, replaced per protocol with improvement    #Incidental right lung nodule: CT scan showed 4 mm nodule of right lower lobe, discussed with  patient and recommended reviewing with PCP about follow up due to h/o tobacco abuse.        Secondary Discharge Diagnoses:     Past Medical History:   Diagnosis Date     Atrophic vaginitis      BPPV (benign paroxysmal positional vertigo)      Cardiac dysrhythmia     beta blocker for this in past     Endometriosis of pelvic peritoneum      Generalized anxiety disorder 2014    cannot tolerate SSRIs. Trial of 12.5mg sertraline and had severe nausea, diarrhea, dizziness. low dose propranolol prn and counseling      History of postmenopausal HRT      Hyperlipidemia      Osteoporosis             Code Status:      Full Code        Brief Hospital Summary:        Reason for your hospital stay      You were admitted for concerns of abdominal pain related to a kidney   stone that you likely passed prior to coming in. You were treated with   fluids and pain/nausea control as needed. You were seen by our urologists   who felt there was no need for a procedure at this time but that your CT   scan showed multiple kidney stones on the right and left side that will   eventually need to be removed and can be arranged as an outpatient. Your   urine did look infected so antibiotics were prescribed. We will follow   your urine culture and call you if changes need to be made.     If you need to get a hold of [facility] for follow up please call   [phone]                    Significant Lab During Hospitalization:      Recent Labs   Lab 08/05/20 0549 08/04/20 2020   WBC 7.8 12.6*   HGB 13.1 15.2   HCT 41.8 48.3*   MCV 93 94    243     Recent Labs   Lab 08/05/20 0549 08/05/20 0109 08/04/20 2020     --  136   POTASSIUM 3.3* 3.5 3.3*   CHLORIDE 109  --  103   CO2 28  --  26   ANIONGAP 4  --  7   GLC 95  --  110*   BUN 12  --  18   CR 0.75  --  0.89   GFRESTIMATED 79  --  64   GFRESTBLACK >90  --  75   PRINCE 8.6  --  9.5     Recent Labs   Lab 08/04/20 2047   COLOR Light Yellow   APPEARANCE Clear   URINEGLC Negative    URINEBILI Negative   URINEKETONE Negative   SG 1.014   UBLD Moderate*   URINEPH 5.5   PROTEIN 20*   NITRITE Negative   LEUKEST Moderate*   RBCU 35*   WBCU 15*            Significant Imaging During Hospitalization:      Results for orders placed or performed during the hospital encounter of 08/04/20   Abd/pelvis CT,  IV  contrast only TRAUMA / AAA    Narrative    EXAM: CT ABDOMEN PELVIS W CONTRAST  LOCATION: [facility]  DATE/TIME: 8/4/2020 9:42 PM    INDICATION: Left lower quadrant pain.  COMPARISON: None.  TECHNIQUE: CT scan of the abdomen and pelvis was performed following injection of IV contrast. Multiplanar reformats were obtained. Dose reduction techniques were used.  CONTRAST: 83 mL Isovue-370.    FINDINGS:   LOWER CHEST: Indeterminate 4 mm nodule right lower lobe on series 3 image 2.    HEPATOBILIARY: Normal.    PANCREAS: Normal.    SPLEEN: Normal.    ADRENAL GLANDS: Normal.    KIDNEYS/BLADDER: There is a branching nonobstructing 2.7 cm stone in the upper pole of the right kidney. There are 5 nonobstructing stones in the lower pole of the left kidney with the largest stone measuring 9 mm. There are a few small benign bilateral   renal cysts. Kidneys are otherwise negative. Mild right-sided hydronephrosis is present with some mild increase left ureteral wall enhancement. This is of indeterminate etiology with a definite obstructing process not identified. No radiopaque   obstructing stones are seen. No hydronephrosis on the right. However, there is a 2 mm stone in the distal right ureter just above the UVJ which does not result in significant obstruction. Bladder is grossly negative.    BOWEL: Normal.    LYMPH NODES: Normal.    VASCULATURE: Unremarkable.    PELVIC ORGANS: Hysterectomy.    MUSCULOSKELETAL: Normal.      Impression    IMPRESSION:   1.  Mild left-sided hydronephrosis. No definite obstructing stone is seen. There is some mild increased left ureteral wall enhancement. Findings are of  indeterminate etiology but may account for the patient's symptoms. This could potentially be seen with   a recently passed stone although obstructing etiology not well visualized on CT not excluded. Clinical correlation.    2.  There is no hydronephrosis on the right. However, there is a 2 mm nonobstructing stone seen in the distal right ureter.    3.  Nonobstructing intrarenal calculi bilaterally including a 2.7 cm branching stone in the upper pole of the right kidney.    4.  Recommend urologic consultation for follow-up. Given obstruction on the left without a definite cause, further evaluation is required which could include CT using the hematuria protocol or retrograde pyelograms to exclude an obstructing lesion on the   left not well visualized on CT.    5.  No significant findings elsewhere. No acute bowel findings.    6.  Technically indeterminate 4 mm nodule right lower lobe. Follow-up as per Fleischner criteria outlined below.    Fleischner Society Recommendations for Pulmonary Nodules    Nodule size less than 6 mm:     Nodules < 6 mm do not require routine follow-up, but certain patients at high risk with suspicious nodule morphology, upper lobe location, or both may warrant 12-month follow-up.                Pending Results:        Unresulted Labs Ordered in the Past 30 Days of this Admission     Date and Time Order Name Status Description    8/4/2020 2231 Urine Culture Preliminary             Consultations This Hospital Stay:      Consultation during this admission received from urology         Discharge Instructions and Follow-Up:      Follow-up Appointments     Follow Up ([facility])      Please make an appt with Nephrology for recurrent kidney stones.   [facility]  [address]  [address]  T:  [phone]         Follow-up and recommended labs and tests       Follow up with primary care provider, Leann Sue, within 7   days for  hospital follow-up and discuss ongoing monitoring of right lower   lobe lung nodule.  No follow up labs or test are needed.    Follow up with Urology, Dr. Clark, about definitive treatment of kidney   stones.               Discharge Disposition:      Discharged to home         Discharge Medications:        Current Discharge Medication List      START taking these medications    Details   acetaminophen (TYLENOL) 325 MG tablet Take 2 tablets (650 mg) by mouth every 4 hours as needed for mild pain  Qty:      Associated Diagnoses: Bilateral kidney stones      ciprofloxacin (CIPRO) 250 MG tablet Take 1 tablet (250 mg) by mouth every 12 hours for 2 days  Qty: 4 tablet, Refills: 0    Associated Diagnoses: Acute cystitis with hematuria      ondansetron (ZOFRAN-ODT) 4 MG ODT tab Take 1 tablet (4 mg) by mouth every 6 hours as needed for nausea or vomiting  Qty: 15 tablet, Refills: 0    Associated Diagnoses: Bilateral kidney stones      oxyCODONE (ROXICODONE) 5 MG tablet Take 1-2 tablets (5-10 mg) by mouth every 6 hours as needed for severe pain  Qty: 12 tablet, Refills: 0    Associated Diagnoses: Bilateral kidney stones      pantoprazole (PROTONIX) 40 MG EC tablet Take 1 tablet (40 mg) by mouth daily  Qty: 30 tablet, Refills: 0    Associated Diagnoses: Gastroesophageal reflux disease without esophagitis      tamsulosin (FLOMAX) 0.4 MG capsule Take 1 capsule (0.4 mg) by mouth daily  Qty: 14 capsule, Refills: 0    Associated Diagnoses: Bilateral kidney stones         CONTINUE these medications which have NOT CHANGED    Details   Cholecalciferol (VITAMIN D PO) Take 2,000 Units by mouth daily       famotidine (PEPCID) 10 MG tablet Take 10 mg by mouth nightly as needed      hydrOXYzine (ATARAX) 25 MG tablet Take 0.5-1 tablets (12.5-25 mg) by mouth every 12 hours as needed for anxiety  Qty: 30 tablet, Refills: 0    Associated Diagnoses: Generalized anxiety disorder      meclizine (ANTIVERT) 25 MG tablet Take 1 tablet (25 mg) by  "mouth 3 times daily as needed for dizziness  Qty: 30 tablet, Refills: 1    Associated Diagnoses: Positional vertigo, bilateral      simvastatin (ZOCOR) 20 MG tablet TAKE 1 TABLET BY MOUTH AT BEDTIME  Qty: 90 tablet, Refills: 2    Associated Diagnoses: Mixed hyperlipidemia      VENTOLIN  (90 Base) MCG/ACT Inhaler INHALE 2 PUFFS INTO THE LUNGS EVERY 6 HOURS  Qty: 1 Inhaler, Refills: 3    Associated Diagnoses: H/O wheezing               Allergies:         Allergies   Allergen Reactions     Cefprozil Hives     Cefzil     Penicillin V          Condition and Physical on Discharge:      Discharge condition: Stable   Vitals: Blood pressure 123/59, pulse 78, temperature 98.2  F (36.8  C), temperature source Oral, resp. rate 16, height 1.626 m (5' 4\"), weight 72.8 kg (160 lb 9.6 oz), SpO2 95 %, not currently breastfeeding.  160 lbs 9.6 oz      GENERAL:  Comfortable.  PSYCH: pleasant, oriented, No acute distress.  HEENT:  PERRLA. Normal conjunctiva, normal hearing, nasal mucosa and oropharynx are normal.  NECK:  Supple, no neck vein distention, adenopathy or bruits, normal thyroid.  HEART:  Normal S1, S2 with no murmur, no pericardial rub, gallops or S3 or S4.  LUNGS:  Clear to auscultation, normal respiratory effort. No wheezing, rales or ronchi.  ABDOMEN:  Soft, no hepatosplenomegaly, normal bowel sounds. Non-tender, non distended.   EXTREMITIES:  No pedal edema, +2 radial pulses bilateral and equal.  SKIN:  Dry to touch, No rash, wound or ulcerations.  NEUROLOGIC:  CN 2-12 intact, BL 5/5 symmetric upper and lower extremity strength, sensation is intact with no focal deficits.     Dia Gonzalez PA-C  "

## 2020-08-05 NOTE — TELEPHONE ENCOUNTER
Spoke to patients and answered her questions about taking the Flomax for stone passage.Laura Hearn LPN

## 2020-08-05 NOTE — PROGRESS NOTES
ROOM # 225    Living Situation: Home alone in [facility]  Facility name:  : [facility] [phone]    Activity level at baseline: Ind  Activity level on admit: SBA      Patient registered to observation; given Patient Bill of Rights; given the opportunity to ask questions about observation status and their plan of care.  Patient has been oriented to the observation room, bathroom and call light is in place.    Discussed discharge goals and expectations with patient/family.

## 2020-08-05 NOTE — PLAN OF CARE
PRIMARY DIAGNOSIS: ACUTE RENAL COLIC    1. Pain Status: Improved but still requiring IV narcotics.    2. Tolerating adequate PO diet: Yes    3. Surgical Intervention planned: Yes    4. Cleared by consultants (if involved): No    5. Return to near baseline physical activity: Yes    Discharge Planner Nurse   Safe discharge environment identified: Yes  Barriers to discharge: Yes       Entered by: Obie Saha 08/05/2020 9:36 AM    K 3.3, 40 meq potassium this morning, pt denies pain overnight, no pain with palpation, regular diet per urology      .

## 2020-08-05 NOTE — TELEPHONE ENCOUNTER
[facility] Call Center    Phone Message    May a detailed message be left on voicemail: yes     Reason for Call: Other: Patient calling requesting a return call to discuss her flomax prescription and the dosage that is recommended. Please call to discuss thank you.      Action Taken: Message routed to:  [facility] urology    Travel Screening: Not Applicable

## 2020-08-05 NOTE — PROGRESS NOTES
Patient's After Visit Summary was reviewed with patient   Patient verbalized understanding of After Visit Summary, recommended follow up and was given an opportunity to ask questions.   Discharge medications sent home with patient/family: YES   Discharged with daughter

## 2020-08-05 NOTE — PLAN OF CARE
PRIMARY DIAGNOSIS: ACUTE RENAL COLIC    1. Pain Status: Improved but still requiring IV narcotics.    2. Tolerating adequate PO diet: Yes    3. Surgical Intervention planned: Yes    4. Cleared by consultants (if involved): No    5. Return to near baseline physical activity: Yes    Discharge Planner Nurse   Safe discharge environment identified: Yes  Barriers to discharge: Yes       Entered by: Mirta Ventura 08/05/2020 5:05 AM    VSS, pt is A&Ox4, up SBA, IVF @ 100, NPO, urology consult in, straining urine, possible OR procedure today, voiding adequately, K 3.3 --> 3.5 after redraw, will continue to monitor      .

## 2020-08-06 LAB
BACTERIA SPEC CULT: NORMAL
BACTERIA SPEC CULT: NORMAL
Lab: NORMAL
SPECIMEN SOURCE: NORMAL

## 2020-08-10 NOTE — UTILIZATION REVIEW
Admission Status; Secondary Review Determination       As part of the [facility] Utilization review plan, a self-audit is done on Medicare inpatient admission with less than 2 midnights stay. The 2014 IPPS Final Rule allows outpatient billing in the event that a hospital determines that an inpatient admission was not medically necessary under utilization review process.          (x) Outpatient status would be Appropriate- Short Stay- Post discharge review.     RATIONALE FOR DETERMINATION   70-year-old  female with remote history of kidney stones many years ago, who presented to [facility] with left-sided flank and bilateral lower abdominal pain. Bilateral kidney stones with fat stranding on the left and some mild hydronephrosis as well as a nonobstructing right-sided kidney stone. Probable passage of left ureteral stone and discharged home same day.  This account and medical record number for a Medicare beneficiary was an inpatient short stay (<2 midnights) and didn't meet medical necessity for inpatient admission. We recommend billing outpatient services based on the severity of illness, intensity of service provided and the inpatient length of stay.  Please contact me within one week of receiving this letter only if you disagree with this determination. If you concur, no further action is needed.          The information on this document is developed by the utilization review team in order for the business office to ensure compliance.  This only denotes the appropriateness of proper admission status and does not reflect the quality of care rendered.         The definitions of Inpatient Status and Observation Status used in making the determination above are those provided in the CMS Coverage Manual, Chapter 1 and Chapter 6, section 70.4.      Sincerely,       YADIRA GARCIA MD    System Medical Director  Utilization  Management  [facility].

## 2020-09-10 ENCOUNTER — VIRTUAL VISIT (OUTPATIENT)
Dept: PHARMACY | Facility: CLINIC | Age: 70
End: 2020-09-10
Payer: COMMERCIAL

## 2020-09-10 DIAGNOSIS — K21.00 GASTROESOPHAGEAL REFLUX DISEASE WITH ESOPHAGITIS: ICD-10-CM

## 2020-09-10 DIAGNOSIS — F41.1 GENERALIZED ANXIETY DISORDER: ICD-10-CM

## 2020-09-10 DIAGNOSIS — H81.13 BENIGN PAROXYSMAL POSITIONAL VERTIGO DUE TO BILATERAL VESTIBULAR DISORDER: ICD-10-CM

## 2020-09-10 DIAGNOSIS — E78.5 HYPERLIPIDEMIA, UNSPECIFIED HYPERLIPIDEMIA TYPE: ICD-10-CM

## 2020-09-10 DIAGNOSIS — Z23 NEED FOR SHINGLES VACCINE: ICD-10-CM

## 2020-09-10 DIAGNOSIS — I49.8 OTHER CARDIAC ARRHYTHMIA: Primary | ICD-10-CM

## 2020-09-10 DIAGNOSIS — N20.0 KIDNEY STONES: ICD-10-CM

## 2020-09-10 PROCEDURE — 99606 MTMS BY PHARM EST 15 MIN: CPT | Mod: TEL | Performed by: PHARMACIST

## 2020-09-10 PROCEDURE — 99607 MTMS BY PHARM ADDL 15 MIN: CPT | Mod: TEL | Performed by: PHARMACIST

## 2020-09-10 RX ORDER — PROPRANOLOL HYDROCHLORIDE 10 MG/1
1 TABLET ORAL PRN
COMMUNITY
Start: 2020-04-27 | End: 2020-10-21

## 2020-09-10 RX ORDER — ALBUTEROL SULFATE 90 UG/1
AEROSOL, METERED RESPIRATORY (INHALATION)
Qty: 1 INHALER | Refills: 3 | COMMUNITY
Start: 2020-09-10 | End: 2021-10-11

## 2020-09-10 NOTE — PROGRESS NOTES
MTM ENCOUNTER  SUBJECTIVE/OBJECTIVE:                           Carola Cox is a 70 year old female called for a follow-up visit. She was referred to me from Dr. Sue.  Today's visit is a follow-up MTM visit from 1/7/2020.     Patient consented to a telehealth visit: yes  Telemedicine Visit Details  Type of service:  Telephone visit  Start Time: 1:33 PM  End Time: 157 pm  Originating Location (pt. Location): Home  Distant Location (provider location):  [facility]  Mode of Communication:  Telephone    Chief Complaint: no concerns    Allergies/ADRs: Reviewed in EHR  Tobacco: She reports that she quit smoking about 41 years ago. She has never used smokeless tobacco.  Alcohol: not currently using  Caffeine: no caffeine    Medication Adherence/Access: no issues reported    SVT: Not taking medication at this time.  Symptoms are okay at this time.  Has been on beta-blockers in the past but had a hard time tolerating them; prefers to be off therapy which Cardiology is aware of.  She does have propranolol to take as needed.    BP Readings from Last 3 Encounters:   08/05/20 125/55   02/17/20 128/80   11/12/19 130/70     Kidney Stone:  Scheduled with Nephrology tomorrow and Urology the following week. Was admitted for stone in Aug; past episode about 43 years ago. Passed a few but still has few stones left that she will need removed. Has ondansetron to use as needed, not needed. Discharged with tamsulosin and pantoprazole but not needed either.    Hyperlipidemia: Current therapy includes simvastatin 20 mg once daily.    Pt reports no significant myalgias or other side effects.     Recent Labs   Lab Test 11/06/19  1123 11/23/18  0909  10/28/15  0937   CHOL 183 158   < > 152   HDL 60 60   < > 55   * 80   < > 76   TRIG 117 90   < > 104   CHOLHDLRATIO  --   --   --  2.8    < > = values in this interval not displayed.         Positional Vertigo: Tried meclizine as needed.  Has seen therapist to learn  exercises.  Still doing them which are helpful.  Will have symptoms randomly; most recent symptoms about 1 year ago.    GERD: Current medications include: Pepcid (famotidine) 10 mg as needed. Pt c/o no current symptoms.  Patient feels that current regimen is effective.    Vaccines:  Due for flu vaccine and Shingrix.    Today's Vitals: There were no vitals taken for this visit.      ASSESSMENT:                            Medication Adherence: No issues identified    SVT: stable    Kidney Stone:  Follow-up with Urology and Nephrology as planned.    Hyperlipidemia: stable    Anxiety: stable    Positional Vertigo: stable.    GERD: stable    Vaccines: due for flu and Shingrix vaccine    PLAN:                            1.  Flu and Shingrix vaccine    I spent 24 minutes with this patient today. All changes were made via collaborative practice agreement with PCP. A copy of the visit note was provided to the patient's primary care provider.    Will follow up in 1 year.    The patient was sent via [employer] a summary of these recommendations.     Arely Vidal , Pharm D  [phone] (phone)  [phone] (pager)  Medication Therapy Management Pharmacist

## 2020-09-17 ENCOUNTER — VIRTUAL VISIT (OUTPATIENT)
Dept: UROLOGY | Facility: CLINIC | Age: 70
End: 2020-09-17
Payer: MEDICARE

## 2020-09-17 VITALS — WEIGHT: 155 LBS | HEIGHT: 64 IN | BODY MASS INDEX: 26.46 KG/M2

## 2020-09-17 DIAGNOSIS — N20.0 NEPHROLITHIASIS: Primary | ICD-10-CM

## 2020-09-17 PROCEDURE — 99203 OFFICE O/P NEW LOW 30 MIN: CPT | Mod: 95 | Performed by: UROLOGY

## 2020-09-17 RX ORDER — CEFAZOLIN SODIUM 1 G
1 VIAL (EA) INJECTION SEE ADMIN INSTRUCTIONS
Status: CANCELLED | OUTPATIENT
Start: 2020-09-17

## 2020-09-17 ASSESSMENT — MIFFLIN-ST. JEOR: SCORE: 1198.08

## 2020-09-17 ASSESSMENT — PAIN SCALES - GENERAL: PAINLEVEL: NO PAIN (0)

## 2020-09-17 NOTE — PROGRESS NOTES
"Carola Cox is a 70 year old female who is being evaluated via a billable video visit.      The patient has been notified of following:     \"This video visit will be conducted via a call between you and your physician/provider. We have found that certain health care needs can be provided without the need for an in-person physical exam.  This service lets us provide the care you need with a video conversation.  If a prescription is necessary we can send it directly to your pharmacy.  If lab work is needed we can place an order for that and you can then stop by our lab to have the test done at a later time.    Video visits are billed at different rates depending on your insurance coverage.  Please reach out to your insurance provider with any questions.    If during the course of the call the physician/provider feels a video visit is not appropriate, you will not be charged for this service.\"    Patient has given verbal consent for Video visit? Yes  How would you like to obtain your AVS? MyChart  If you are dropped from the video visit, the video invite should be resent to: Text to cell phone: [phone]  Will anyone else be joining your video visit? [facility]  Main Office: [address]  [address] [place] [address]       CHIEF COMPLAINT:   Bilateral kidney stones    HISTORY:   This is a 70-year-old woman who was admitted to the hospital last month when she presented with left-sided flank pain.  She had a CT scan that showed left hydronephrosis but no stone in the left ureter, consistent with a likely recently passed stone.  She did have a small 1 or 2 mm stone in the distal right ureter at the time.  She has had no right-sided pain continues to have no pain.  The CT scan also incidentally found a very large staghorn stone in the upper pole of the right kidney as well as 2 stones in the lower pole of the left kidney.  She remains asymptomatic.  This video visit was set up for today in " order to discuss treatment options for her stones.    She reports that she has passed multiple stones throughout her life but has never required any sort of procedure for her stones.      PAST MEDICAL HISTORY:   Past Medical History:   Diagnosis Date     Atrophic vaginitis      BPPV (benign paroxysmal positional vertigo)      Cardiac dysrhythmia     beta blocker for this in past     Endometriosis of pelvic peritoneum      Generalized anxiety disorder 2014    cannot tolerate SSRIs. Trial of 12.5mg sertraline and had severe nausea, diarrhea, dizziness. low dose propranolol prn and counseling      History of postmenopausal HRT      Hyperlipidemia      Osteoporosis        PAST SURGICAL HISTORY:     Past Surgical History:   Procedure Laterality Date     HYSTERECTOMY TOTAL ABDOMINAL, BILATERAL SALPINGO-OOPHORECTOMY, COMBINED      Endometriosis.     LITHOTRIPSY         FAMILY HISTORY:   Family History   Problem Relation Age of Onset     Hyperlipidemia Father      Hypertension Father      Breast Cancer Maternal Grandmother      Heart Disease Other         Family History     Thyroid Disease Sister        SOCIAL HISTORY:   Social History     Tobacco Use     Smoking status: Former Smoker     Last attempt to quit: 1979     Years since quittin.7     Smokeless tobacco: Never Used   Substance Use Topics     Alcohol use: Not Currently     Alcohol/week: 0.0 standard drinks        ALLERGIES:  Allergies   Allergen Reactions     Cefprozil Hives     Cefzil     Penicillin V        MEDICATIONS:     Current Outpatient Medications:      acetaminophen (TYLENOL) 325 MG tablet, Take 2 tablets (650 mg) by mouth every 4 hours as needed for mild pain, Disp:  , Rfl:      albuterol (VENTOLIN HFA) 108 (90 Base) MCG/ACT inhaler, INHALE 2 PUFFS INTO THE LUNGS EVERY 6 HOURS PRN, Disp: 1 Inhaler, Rfl: 3     Cholecalciferol (VITAMIN D PO), Take 2,000 Units by mouth daily , Disp: , Rfl:      famotidine (PEPCID) 10 MG tablet, Take 10 mg by mouth  nightly as needed, Disp: , Rfl:      meclizine (ANTIVERT) 25 MG tablet, Take 1 tablet (25 mg) by mouth 3 times daily as needed for dizziness, Disp: 30 tablet, Rfl: 1     ondansetron (ZOFRAN-ODT) 4 MG ODT tab, Take 1 tablet (4 mg) by mouth every 6 hours as needed for nausea or vomiting, Disp: 15 tablet, Rfl: 0     propranolol (INDERAL) 10 MG tablet, Take 1 tablet by mouth as needed, Disp: , Rfl:      simvastatin (ZOCOR) 20 MG tablet, TAKE 1 TABLET BY MOUTH AT BEDTIME, Disp: 90 tablet, Rfl: 2    REVIEW OF SYSTEMS:  Allergic/Immunologic: negative  Constitutional Symptoms: negative   Fever: none   Weight loss: none   Other: none  Hematologic/Lymphatic: negative  Integumentary: negative   Breast: negative   Hair: negative   Skin: negative   Skin: negative  Eyes: negative  Ears/Nose/Throat: negative  Respiratory: negative  Cardiovascular: negative  Gastrointestinal: negative  Genitourinary: negative  Musculoskeletal: negative  Neurologic: negative  Psychiatric: negative  Reproductive System: negative  Endocrine: negative      PHYSICAL EXAM:    General: Alert and oriented to time, place, and self. In NAD   HEENT: Head AT/NC, EOMI, CN Grossly intact   Lungs: no respiratory distress, or pursed lip breathing   Heart: No obvious jugular venous distension present   Musculoskeltal: Normal movements. Normal appearing musculature  Skin: no suspicious lesions or rashes   Neuro: Alert, oriented, speech and mentation normal; moving all 4 extremities equally.   Psych: affect and mood normal      Laboratory Studies:     Imaging Studies: I reviewed her CT scan images from last month.  Upper pole the right kidney is enveloped with a large partial staghorn calculus.  On the left side there are two 9 mm stones in the lower pole of the left kidney as well as a small stone.  She had a tiny 1 or 2 mm stone in the distal right ureter at the time.      CLINICAL IMPRESSION:   Bilateral kidney stones    PLAN:   She has large bilateral kidney  stones.  We had a lengthy discussion today about treatment options for kidney stones.  Treatment options discussed included percutaneous nephrolithotomy, ureteroscopy with holmium laser lithotripsy, and shockwave lithotripsy.    I recommended that we begin by treating her right side.  She understands that this side may require multiple procedures in order to remove all her stone burden.  Due to the size of this stone I recommended a right-sided percutaneous nephrolithotomy.  We discussed the procedure in detail today along with its risks and expected recovery.  All of her questions were answered.  She wishes to proceed.    Once she has recovered from treatment on the right side we discussed treatment options for the left side.  These stones could also be treated percutaneously but could also be amenable to ureteroscopy or shockwave lithotripsy with a stent in place.  We can revisit her treatment options once the right-sided stones done.  Also, if for any reason she requires a minimal invasive follow-up procedure on the right side is could potentially be performed at the same time as a minimally invasive procedure on the left side.    She had questions about COVID risks and concerns as well.  We discussed medication methods that are being used in the hospital to minimize COVID risk.  However, she understands that there is still always a risk of wagner, but when she comes in the hospital for surgery.  We discussed the possibility of delaying stone treatment until she has been vaccinated.  The timing of this is of course very uncertain at this time.  We discussed the risks of stone passage or stone progression at time intervening.  It would not be unreasonable to wait at least some period of time for her surgery.  She will think things over but thinks that she likely wants to go ahead and do the right-sided percutaneous removal in the near future.  Therefore I will put in surgery orders for her and we will contact  her to schedule.      Chip Clark M.D.      Video-Visit Details    Type of service:  Video Visit    Video Start Time: 8:14 AM  Video End Time: 8:38 AM    Originating Location (pt. Location): Home    Distant Location (provider location):  [facility] [place]     Platform used for Video Visit: [employer]    Chip Clark MD

## 2020-09-17 NOTE — LETTER
"9/17/2020       RE: Carola Cox  [address] [facility] [id number]     Dear Colleague,    Thank you for referring your patient, Carola Cox, to the [facility] [place] at [facility]. Please see a copy of my visit note below.    Carola Cox is a 70 year old female who is being evaluated via a billable video visit.      The patient has been notified of following:     \"This video visit will be conducted via a call between you and your physician/provider. We have found that certain health care needs can be provided without the need for an in-person physical exam.  This service lets us provide the care you need with a video conversation.  If a prescription is necessary we can send it directly to your pharmacy.  If lab work is needed we can place an order for that and you can then stop by our lab to have the test done at a later time.    Video visits are billed at different rates depending on your insurance coverage.  Please reach out to your insurance provider with any questions.    If during the course of the call the physician/provider feels a video visit is not appropriate, you will not be charged for this service.\"    Patient has given verbal consent for Video visit? Yes  How would you like to obtain your AVS? MyChart  If you are dropped from the video visit, the video invite should be resent to: Text to cell phone: [phone]  Will anyone else be joining your video visit? No      [facility]  Main Office: [address]  [address] [place] [id number]       CHIEF COMPLAINT:   Bilateral kidney stones    HISTORY:   This is a 70-year-old woman who was admitted to the hospital last month when she presented with left-sided flank pain.  She had a CT scan that showed left hydronephrosis but no stone in the left ureter, consistent with a likely recently passed stone.  She did have a small 1 or 2 mm stone in " the distal right ureter at the time.  She has had no right-sided pain continues to have no pain.  The CT scan also incidentally found a very large staghorn stone in the upper pole of the right kidney as well as 2 stones in the lower pole of the left kidney.  She remains asymptomatic.  This video visit was set up for today in order to discuss treatment options for her stones.    She reports that she has passed multiple stones throughout her life but has never required any sort of procedure for her stones.      PAST MEDICAL HISTORY:   Past Medical History:   Diagnosis Date     Atrophic vaginitis      BPPV (benign paroxysmal positional vertigo)      Cardiac dysrhythmia     beta blocker for this in past     Endometriosis of pelvic peritoneum      Generalized anxiety disorder 2014    cannot tolerate SSRIs. Trial of 12.5mg sertraline and had severe nausea, diarrhea, dizziness. low dose propranolol prn and counseling      History of postmenopausal HRT      Hyperlipidemia      Osteoporosis        PAST SURGICAL HISTORY:     Past Surgical History:   Procedure Laterality Date     HYSTERECTOMY TOTAL ABDOMINAL, BILATERAL SALPINGO-OOPHORECTOMY, COMBINED      Endometriosis.     LITHOTRIPSY         FAMILY HISTORY:   Family History   Problem Relation Age of Onset     Hyperlipidemia Father      Hypertension Father      Breast Cancer Maternal Grandmother      Heart Disease Other         Family History     Thyroid Disease Sister        SOCIAL HISTORY:   Social History     Tobacco Use     Smoking status: Former Smoker     Last attempt to quit: 1979     Years since quittin.7     Smokeless tobacco: Never Used   Substance Use Topics     Alcohol use: Not Currently     Alcohol/week: 0.0 standard drinks        ALLERGIES:  Allergies   Allergen Reactions     Cefprozil Hives     Cefzil     Penicillin V        MEDICATIONS:     Current Outpatient Medications:      acetaminophen (TYLENOL) 325 MG tablet, Take 2 tablets (650 mg) by mouth  every 4 hours as needed for mild pain, Disp:  , Rfl:      albuterol (VENTOLIN HFA) 108 (90 Base) MCG/ACT inhaler, INHALE 2 PUFFS INTO THE LUNGS EVERY 6 HOURS PRN, Disp: 1 Inhaler, Rfl: 3     Cholecalciferol (VITAMIN D PO), Take 2,000 Units by mouth daily , Disp: , Rfl:      famotidine (PEPCID) 10 MG tablet, Take 10 mg by mouth nightly as needed, Disp: , Rfl:      meclizine (ANTIVERT) 25 MG tablet, Take 1 tablet (25 mg) by mouth 3 times daily as needed for dizziness, Disp: 30 tablet, Rfl: 1     ondansetron (ZOFRAN-ODT) 4 MG ODT tab, Take 1 tablet (4 mg) by mouth every 6 hours as needed for nausea or vomiting, Disp: 15 tablet, Rfl: 0     propranolol (INDERAL) 10 MG tablet, Take 1 tablet by mouth as needed, Disp: , Rfl:      simvastatin (ZOCOR) 20 MG tablet, TAKE 1 TABLET BY MOUTH AT BEDTIME, Disp: 90 tablet, Rfl: 2    REVIEW OF SYSTEMS:  Allergic/Immunologic: negative  Constitutional Symptoms: negative   Fever: none   Weight loss: none   Other: none  Hematologic/Lymphatic: negative  Integumentary: negative   Breast: negative   Hair: negative   Skin: negative   Skin: negative  Eyes: negative  Ears/Nose/Throat: negative  Respiratory: negative  Cardiovascular: negative  Gastrointestinal: negative  Genitourinary: negative  Musculoskeletal: negative  Neurologic: negative  Psychiatric: negative  Reproductive System: negative  Endocrine: negative      PHYSICAL EXAM:    General: Alert and oriented to time, place, and self. In NAD   HEENT: Head AT/NC, EOMI, CN Grossly intact   Lungs: no respiratory distress, or pursed lip breathing   Heart: No obvious jugular venous distension present   Musculoskeltal: Normal movements. Normal appearing musculature  Skin: no suspicious lesions or rashes   Neuro: Alert, oriented, speech and mentation normal; moving all 4 extremities equally.   Psych: affect and mood normal      Laboratory Studies:     Imaging Studies: I reviewed her CT scan images from last month.  Upper pole the right kidney  is enveloped with a large partial staghorn calculus.  On the left side there are two 9 mm stones in the lower pole of the left kidney as well as a small stone.  She had a tiny 1 or 2 mm stone in the distal right ureter at the time.      CLINICAL IMPRESSION:   Bilateral kidney stones    PLAN:   She has large bilateral kidney stones.  We had a lengthy discussion today about treatment options for kidney stones.  Treatment options discussed included percutaneous nephrolithotomy, ureteroscopy with holmium laser lithotripsy, and shockwave lithotripsy.    I recommended that we begin by treating her right side.  She understands that this side may require multiple procedures in order to remove all her stone burden.  Due to the size of this stone I recommended a right-sided percutaneous nephrolithotomy.  We discussed the procedure in detail today along with its risks and expected recovery.  All of her questions were answered.  She wishes to proceed.    Once she has recovered from treatment on the right side we discussed treatment options for the left side.  These stones could also be treated percutaneously but could also be amenable to ureteroscopy or shockwave lithotripsy with a stent in place.  We can revisit her treatment options once the right-sided stones done.  Also, if for any reason she requires a minimal invasive follow-up procedure on the right side is could potentially be performed at the same time as a minimally invasive procedure on the left side.    She had questions about COVID risks and concerns as well.  We discussed medication methods that are being used in the hospital to minimize COVID risk.  However, she understands that there is still always a risk of wagner, but when she comes in the hospital for surgery.  We discussed the possibility of delaying stone treatment until she has been vaccinated.  The timing of this is of course very uncertain at this time.  We discussed the risks of stone passage or  stone progression at time intervening.  It would not be unreasonable to wait at least some period of time for her surgery.  She will think things over but thinks that she likely wants to go ahead and do the right-sided percutaneous removal in the near future.  Therefore I will put in surgery orders for her and we will contact her to schedule.      Chip Clark M.D.      Video-Visit Details    Type of service:  Video Visit    Video Start Time: 8:14 AM  Video End Time: 8:38 AM    Originating Location (pt. Location): Home    Distant Location (provider location):  [facility] [place]     Platform used for Video Visit: [employer]    Chip Clark MD

## 2020-09-18 DIAGNOSIS — Z11.59 ENCOUNTER FOR SCREENING FOR OTHER VIRAL DISEASES: Primary | ICD-10-CM

## 2020-09-18 PROBLEM — N20.0 NEPHROLITHIASIS: Status: ACTIVE | Noted: 2020-09-18

## 2020-10-02 ENCOUNTER — VIRTUAL VISIT (OUTPATIENT)
Dept: UROLOGY | Facility: CLINIC | Age: 70
End: 2020-10-02
Payer: MEDICARE

## 2020-10-02 VITALS — HEIGHT: 64 IN | WEIGHT: 155 LBS | BODY MASS INDEX: 26.46 KG/M2

## 2020-10-02 DIAGNOSIS — N20.0 KIDNEY STONES: Primary | ICD-10-CM

## 2020-10-02 PROCEDURE — 99213 OFFICE O/P EST LOW 20 MIN: CPT | Mod: 95 | Performed by: PHYSICIAN ASSISTANT

## 2020-10-02 ASSESSMENT — MIFFLIN-ST. JEOR: SCORE: 1198.08

## 2020-10-02 ASSESSMENT — PAIN SCALES - GENERAL: PAINLEVEL: NO PAIN (0)

## 2020-10-02 NOTE — LETTER
"10/2/2020       RE: Carola Cox  [address] [facility] [address]     Dear Colleague,    Thank you for referring your patient, Carola Cox, to the [facility] [place] at [facility]. Please see a copy of my visit note below.    Carola Cox is a 70 year old female who is being evaluated via a billable video visit.      The patient has been notified of following:     \"This video visit will be conducted via a call between you and your physician/provider. We have found that certain health care needs can be provided without the need for an in-person physical exam.  This service lets us provide the care you need with a video conversation.  If a prescription is necessary we can send it directly to your pharmacy.  If lab work is needed we can place an order for that and you can then stop by our lab to have the test done at a later time.    Video visits are billed at different rates depending on your insurance coverage.  Please reach out to your insurance provider with any questions.    If during the course of the call the physician/provider feels a video visit is not appropriate, you will not be charged for this service.\"    Patient has given verbal consent for Video visit? Yes  How would you like to obtain your AVS? MyChart  If you are dropped from the video visit, the video invite should be resent to: Text to cell phone: [phone]  Will anyone else be joining your video visit? No    ROSALINE Evans CMA      Video-Visit Details    Type of service:  Video Visit    Video Start Time: 11:32 AM  Video End Time: 11:48 AM    Originating Location (pt. Location): Home    Distant Location (provider location):  [facility] [place]     Platform used for Video Visit: [employer]        [facility]  Main Office: [address]  [address] [place] [id number]         CHIEF COMPLAINT:   Bilateral kidney " stones     HISTORY:   This is a pleasant 70-year-old woman who was admitted to the hospital last month when she presented with left-sided flank pain.  She had a CT scan that showed left hydronephrosis but no stone in the left ureter, consistent with a likely recently passed stone.  She did have a small 1 or 2 mm stone in the distal right ureter at the time.  She has had no right-sided pain continues to have no pain.  The CT scan also incidentally found a very large staghorn stone in the upper pole of the right kidney as well as 2 stones in the lower pole of the left kidney.  She remains asymptomatic.  This video visit was set up for today in order to discuss questions she has regarding her treatment options for her stones.  She is scheduled for right PCNL and stent with Dr. Clark in Nov. Several questions about what to expect in the sima-op period as well as what to expect about having a stent in place.      She reports that she has passed multiple stones throughout her life but has never required any sort of procedure for her stones.  Recently established with Nephrology.        PAST MEDICAL HISTORY:   Past Medical History        Past Medical History:   Diagnosis Date     Atrophic vaginitis       BPPV (benign paroxysmal positional vertigo)       Cardiac dysrhythmia       beta blocker for this in past     Endometriosis of pelvic peritoneum       Generalized anxiety disorder 2014     cannot tolerate SSRIs. Trial of 12.5mg sertraline and had severe nausea, diarrhea, dizziness. low dose propranolol prn and counseling      History of postmenopausal HRT       Hyperlipidemia       Osteoporosis              PAST SURGICAL HISTORY:     Past Surgical History         Past Surgical History:   Procedure Laterality Date     HYSTERECTOMY TOTAL ABDOMINAL, BILATERAL SALPINGO-OOPHORECTOMY, COMBINED         Endometriosis.     LITHOTRIPSY                FAMILY HISTORY:   Family History         Family History   Problem Relation Age of  Onset     Hyperlipidemia Father       Hypertension Father       Breast Cancer Maternal Grandmother       Heart Disease Other           Family History     Thyroid Disease Sister              SOCIAL HISTORY:   Social History            Tobacco Use     Smoking status: Former Smoker       Last attempt to quit: 1979       Years since quittin.7     Smokeless tobacco: Never Used   Substance Use Topics     Alcohol use: Not Currently       Alcohol/week: 0.0 standard drinks          ALLERGIES:        Allergies   Allergen Reactions     Cefprozil Hives       Cefzil     Penicillin V           MEDICATIONS:      Current Outpatient Medications:      acetaminophen (TYLENOL) 325 MG tablet, Take 2 tablets (650 mg) by mouth every 4 hours as needed for mild pain, Disp:  , Rfl:      albuterol (VENTOLIN HFA) 108 (90 Base) MCG/ACT inhaler, INHALE 2 PUFFS INTO THE LUNGS EVERY 6 HOURS PRN, Disp: 1 Inhaler, Rfl: 3     Cholecalciferol (VITAMIN D PO), Take 2,000 Units by mouth daily , Disp: , Rfl:      famotidine (PEPCID) 10 MG tablet, Take 10 mg by mouth nightly as needed, Disp: , Rfl:      meclizine (ANTIVERT) 25 MG tablet, Take 1 tablet (25 mg) by mouth 3 times daily as needed for dizziness, Disp: 30 tablet, Rfl: 1     ondansetron (ZOFRAN-ODT) 4 MG ODT tab, Take 1 tablet (4 mg) by mouth every 6 hours as needed for nausea or vomiting, Disp: 15 tablet, Rfl: 0     propranolol (INDERAL) 10 MG tablet, Take 1 tablet by mouth as needed, Disp: , Rfl:      simvastatin (ZOCOR) 20 MG tablet, TAKE 1 TABLET BY MOUTH AT BEDTIME, Disp: 90 tablet, Rfl: 2     REVIEW OF SYSTEMS: 10-pt ROS neg other than that noted in the HPI.       PHYSICAL EXAM:     General: Alert and oriented to time, place, and self. In NAD   HEENT: Head AT/NC, EOMI, CN Grossly intact          Imaging Studies: CT upper pole the right kidney is enveloped with a large partial staghorn calculus.  On the left side there are two 9 mm stones in the lower pole of the left kidney as well  as a small stone.  She had a tiny 1 or 2 mm stone in the distal right ureter at the time.        CLINICAL IMPRESSION:   Bilateral kidney stones     PLAN:   -Questions answered.  -Right PCNL and stent planned in Nov. Will have pre-op exam and COVID-19 testing prior. We reviewed she may need more than one procedure to rid her of the right-sided stone burden.    Evelyn Potter PA-C  [facility]  [phone]

## 2020-10-02 NOTE — PROGRESS NOTES
"Carola Cox is a 70 year old female who is being evaluated via a billable video visit.      The patient has been notified of following:     \"This video visit will be conducted via a call between you and your physician/provider. We have found that certain health care needs can be provided without the need for an in-person physical exam.  This service lets us provide the care you need with a video conversation.  If a prescription is necessary we can send it directly to your pharmacy.  If lab work is needed we can place an order for that and you can then stop by our lab to have the test done at a later time.    Video visits are billed at different rates depending on your insurance coverage.  Please reach out to your insurance provider with any questions.    If during the course of the call the physician/provider feels a video visit is not appropriate, you will not be charged for this service.\"    Patient has given verbal consent for Video visit? Yes  How would you like to obtain your AVS? MyChart  If you are dropped from the video visit, the video invite should be resent to: Text to cell phone: [phone]  Will anyone else be joining your video visit? No    ROSALINE Evans CMA      Video-Visit Details    Type of service:  Video Visit    Video Start Time: 11:32 AM  Video End Time: 11:48 AM    Originating Location (pt. Location): Home    Distant Location (provider location):  [facility] [place]     Platform used for Video Visit: Secpanel        [facility]  Main Office: [address]  [address] [place] [address]         CHIEF COMPLAINT:   Bilateral kidney stones     HISTORY:   This is a pleasant 70-year-old woman who was admitted to the hospital last month when she presented with left-sided flank pain.  She had a CT scan that showed left hydronephrosis but no stone in the left ureter, consistent with a likely recently passed stone.  She did have a small 1 or 2 mm stone in the " distal right ureter at the time.  She has had no right-sided pain continues to have no pain.  The CT scan also incidentally found a very large staghorn stone in the upper pole of the right kidney as well as 2 stones in the lower pole of the left kidney.  She remains asymptomatic.  This video visit was set up for today in order to discuss questions she has regarding her treatment options for her stones.  She is scheduled for right PCNL and stent with Dr. Clark in Nov. Several questions about what to expect in the sima-op period as well as what to expect about having a stent in place.      She reports that she has passed multiple stones throughout her life but has never required any sort of procedure for her stones.  Recently established with Nephrology.        PAST MEDICAL HISTORY:   Past Medical History        Past Medical History:   Diagnosis Date     Atrophic vaginitis       BPPV (benign paroxysmal positional vertigo)       Cardiac dysrhythmia       beta blocker for this in past     Endometriosis of pelvic peritoneum       Generalized anxiety disorder 2014     cannot tolerate SSRIs. Trial of 12.5mg sertraline and had severe nausea, diarrhea, dizziness. low dose propranolol prn and counseling      History of postmenopausal HRT       Hyperlipidemia       Osteoporosis              PAST SURGICAL HISTORY:     Past Surgical History         Past Surgical History:   Procedure Laterality Date     HYSTERECTOMY TOTAL ABDOMINAL, BILATERAL SALPINGO-OOPHORECTOMY, COMBINED         Endometriosis.     LITHOTRIPSY                FAMILY HISTORY:   Family History         Family History   Problem Relation Age of Onset     Hyperlipidemia Father       Hypertension Father       Breast Cancer Maternal Grandmother       Heart Disease Other           Family History     Thyroid Disease Sister              SOCIAL HISTORY:   Social History            Tobacco Use     Smoking status: Former Smoker       Last attempt to quit: 1/6/1979        Years since quittin.7     Smokeless tobacco: Never Used   Substance Use Topics     Alcohol use: Not Currently       Alcohol/week: 0.0 standard drinks          ALLERGIES:        Allergies   Allergen Reactions     Cefprozil Hives       Cefzil     Penicillin V           MEDICATIONS:      Current Outpatient Medications:      acetaminophen (TYLENOL) 325 MG tablet, Take 2 tablets (650 mg) by mouth every 4 hours as needed for mild pain, Disp:  , Rfl:      albuterol (VENTOLIN HFA) 108 (90 Base) MCG/ACT inhaler, INHALE 2 PUFFS INTO THE LUNGS EVERY 6 HOURS PRN, Disp: 1 Inhaler, Rfl: 3     Cholecalciferol (VITAMIN D PO), Take 2,000 Units by mouth daily , Disp: , Rfl:      famotidine (PEPCID) 10 MG tablet, Take 10 mg by mouth nightly as needed, Disp: , Rfl:      meclizine (ANTIVERT) 25 MG tablet, Take 1 tablet (25 mg) by mouth 3 times daily as needed for dizziness, Disp: 30 tablet, Rfl: 1     ondansetron (ZOFRAN-ODT) 4 MG ODT tab, Take 1 tablet (4 mg) by mouth every 6 hours as needed for nausea or vomiting, Disp: 15 tablet, Rfl: 0     propranolol (INDERAL) 10 MG tablet, Take 1 tablet by mouth as needed, Disp: , Rfl:      simvastatin (ZOCOR) 20 MG tablet, TAKE 1 TABLET BY MOUTH AT BEDTIME, Disp: 90 tablet, Rfl: 2     REVIEW OF SYSTEMS: 10-pt ROS neg other than that noted in the HPI.       PHYSICAL EXAM:     General: Alert and oriented to time, place, and self. In NAD   HEENT: Head AT/NC, EOMI, CN Grossly intact          Imaging Studies: CT upper pole the right kidney is enveloped with a large partial staghorn calculus.  On the left side there are two 9 mm stones in the lower pole of the left kidney as well as a small stone.  She had a tiny 1 or 2 mm stone in the distal right ureter at the time.        CLINICAL IMPRESSION:   Bilateral kidney stones     PLAN:   -Questions answered.  -Right PCNL and stent planned in Nov. Will have pre-op exam and COVID-19 testing prior. We reviewed she may need more than one procedure to rid  her of the right-sided stone burden.    Evelyn Potter PA-C  [facility]  [phone]

## 2020-10-21 ENCOUNTER — OFFICE VISIT (OUTPATIENT)
Dept: INTERNAL MEDICINE | Facility: CLINIC | Age: 70
End: 2020-10-21
Payer: MEDICARE

## 2020-10-21 VITALS
WEIGHT: 159 LBS | TEMPERATURE: 98.8 F | HEART RATE: 89 BPM | SYSTOLIC BLOOD PRESSURE: 157 MMHG | BODY MASS INDEX: 27.14 KG/M2 | OXYGEN SATURATION: 98 % | DIASTOLIC BLOOD PRESSURE: 88 MMHG | HEIGHT: 64 IN

## 2020-10-21 DIAGNOSIS — R00.2 PALPITATIONS: ICD-10-CM

## 2020-10-21 DIAGNOSIS — N20.0 NEPHROLITHIASIS: ICD-10-CM

## 2020-10-21 DIAGNOSIS — F41.1 GENERALIZED ANXIETY DISORDER: ICD-10-CM

## 2020-10-21 DIAGNOSIS — I10 ESSENTIAL HYPERTENSION: ICD-10-CM

## 2020-10-21 DIAGNOSIS — Z01.818 PREOP GENERAL PHYSICAL EXAM: Primary | ICD-10-CM

## 2020-10-21 PROCEDURE — 93000 ELECTROCARDIOGRAM COMPLETE: CPT | Performed by: INTERNAL MEDICINE

## 2020-10-21 PROCEDURE — 99215 OFFICE O/P EST HI 40 MIN: CPT | Performed by: INTERNAL MEDICINE

## 2020-10-21 RX ORDER — PROPRANOLOL HYDROCHLORIDE 10 MG/1
10 TABLET ORAL 3 TIMES DAILY
Qty: 90 TABLET | Refills: 11 | Status: SHIPPED | OUTPATIENT
Start: 2020-10-21 | End: 2021-05-04

## 2020-10-21 ASSESSMENT — MIFFLIN-ST. JEOR: SCORE: 1216.22

## 2020-10-21 NOTE — PATIENT INSTRUCTIONS

## 2020-10-21 NOTE — PROGRESS NOTES
[place] [address]  [facility] [address]  [phone]  Dept: [phone]    PRE-OP EVALUATION:  Today's date: 10/21/2020    Carola Cox (: 1950) presents for pre-operative evaluation assessment as requested by Dr. Clark.  She requires evaluation and anesthesia risk assessment prior to undergoing surgery/procedure for treatment of kidney stones .    Proposed Surgery/ Procedure: Right percutaneous nephrolithotomy, Flexible cystoscopy with right sided ureteral stent placement  Date of Surgery/ Procedure: 2020  Time of Surgery/ Procedure: 8:00AM  Hospital/Surgical Facility: [facility]  Surgery Fax Number: Note does not need to be faxed, will be available electronically in Epic.  Primary Physician: Leann Sue  Type of Anesthesia Anticipated: General    Preoperative Questionnaire:   No - Have you ever had a heart attack or stroke?  No - Have you ever had surgery on your heart or blood vessels, such as a stent, coronary (heart) bypass, or surgery on an artery in the head, neck, heart, or legs?  No - Do you have chest pain when you are physically active?  No - Do you have a history of heart failure?  No - Do you currently have a cold, bronchitis, or symptoms of other respiratory (head and chest) infections?  No - Do you have a cough, shortness of breath, or wheezing?  No - Do you or anyone in your family have a history of blood clots?  No - Do you or anyone in your family have a serious bleeding problem, such as long-lasting bleeding after surgeries or cuts?  No - Have you ever had anemia or been told to take iron pills?  No - Have you had any abnormal blood loss such as black, tarry or bloody stools, or abnormal vaginal bleeding?  No - Have you ever had a blood transfusion?  Yes - Are you willing to have a blood transfusion if it is medically needed before, during, or after your surgery?  YES - HAVE YOU OR ANYONE IN YOUR FAMILY EVER HAD  PROBLEMS WITH ANESTHESIA (SEDATION FOR SURGERY)?   No - Do you have sleep apnea, excessive snoring, or daytime drowsiness?   YES - DO YOU HAVE SLEEP APNEA, EXCESSIVE SNORING, OR DAYTIME DROWSINESS?  DO YOU HAVE A CPAP MACHINE?   No - Do you have any artifical joints?  No - Are you allergic to latex?  No - Is there any chance that you may be pregnant?    Patient has a Health Care Directive or Living Will:  NO    HPI:     HPI related to upcoming procedure: scheduled for Right percutaneous nephrolithotomy, Flexible cystoscopy with right sided ureteral stent placement.  Has h/o nephrolithiasis. No current pain, dysuria or hematuria.   Has h/o palpitations, Holter showed episodes of SVT, no associated CP, dizziness, SOB.   Able to exercise, no palpitations related to physical activity.   Has h/o anxiety, stress related.       See problem list for active medical problems.  Problems all longstanding and stable, except as noted/documented.  See ROS for pertinent symptoms related to these conditions.      MEDICAL HISTORY:     Patient Active Problem List    Diagnosis Date Noted     Nephrolithiasis 09/18/2020     Priority: Medium     Added automatically from request for surgery [id number]       Kidney stones 08/05/2020     Priority: Medium     H/O wheezing 11/13/2017     Priority: Medium     Allergy induced, takes albuterol prn        Hyperlipidemia      Priority: Medium     Cardiac dysrhythmia      Priority: Medium     beta blocker for this in past       Atrophic vaginitis      Priority: Medium     Osteoporosis      Priority: Medium     Generalized anxiety disorder      Priority: Medium     cannot tolerate SSRIs. Trial of 12.5mg sertraline and had severe nausea, diarrhea, dizziness. low dose propranolol prn and counseling        BPPV (benign paroxysmal positional vertigo)      Priority: Medium     CARDIOVASCULAR SCREENING; LDL GOAL LESS THAN 160 12/31/2013     Priority: Medium      Past Medical History:   Diagnosis Date      Atrophic vaginitis      BPPV (benign paroxysmal positional vertigo)      Cardiac dysrhythmia     beta blocker for this in past     Complication of anesthesia      Endometriosis of pelvic peritoneum      Generalized anxiety disorder 2014    cannot tolerate SSRIs. Trial of 12.5mg sertraline and had severe nausea, diarrhea, dizziness. low dose propranolol prn and counseling      History of postmenopausal HRT      Hyperlipidemia      Mumps      Osteoporosis      Palpitations      Past Surgical History:   Procedure Laterality Date     CYSTOSCOPY       HYSTERECTOMY TOTAL ABDOMINAL, BILATERAL SALPINGO-OOPHORECTOMY, COMBINED      Endometriosis.     LITHOTRIPSY       Current Outpatient Medications   Medication Sig Dispense Refill     albuterol (VENTOLIN HFA) 108 (90 Base) MCG/ACT inhaler INHALE 2 PUFFS INTO THE LUNGS EVERY 6 HOURS PRN 1 Inhaler 3     Cholecalciferol (VITAMIN D PO) Take 2,000 Units by mouth daily        famotidine (PEPCID) 10 MG tablet Take 10 mg by mouth nightly as needed       meclizine (ANTIVERT) 25 MG tablet Take 1 tablet (25 mg) by mouth 3 times daily as needed for dizziness 30 tablet 1     ondansetron (ZOFRAN-ODT) 4 MG ODT tab Take 1 tablet (4 mg) by mouth every 6 hours as needed for nausea or vomiting 15 tablet 0     propranolol (INDERAL) 10 MG tablet Take 1 tablet by mouth as needed       simvastatin (ZOCOR) 20 MG tablet TAKE 1 TABLET BY MOUTH AT BEDTIME 90 tablet 2     OTC products: None, except as noted above    Allergies   Allergen Reactions     Cefprozil Hives     Cefzil     Penicillin V       Latex Allergy: NO    Social History     Tobacco Use     Smoking status: Former Smoker     Quit date: 1979     Years since quittin.8     Smokeless tobacco: Never Used   Substance Use Topics     Alcohol use: Not Currently     Alcohol/week: 0.0 standard drinks     History   Drug Use No       REVIEW OF SYSTEMS:   CONSTITUTIONAL: NEGATIVE for fever, chills, change in weight  INTEGUMENTARY/SKIN: NEGATIVE  "for worrisome rashes, moles or lesions  EYES: NEGATIVE for vision changes or irritation  ENT/MOUTH: NEGATIVE for ear, mouth and throat problems  RESP: NEGATIVE for significant cough or SOB  BREAST: NEGATIVE for masses, tenderness or discharge  CV: NEGATIVE for chest pain, palpitations or peripheral edema  GI: NEGATIVE for nausea, abdominal pain, heartburn, or change in bowel habits  : NEGATIVE for frequency, dysuria, or hematuria  MUSCULOSKELETAL: NEGATIVE for significant arthralgias or myalgia  NEURO: NEGATIVE for weakness, dizziness or paresthesias  ENDOCRINE: NEGATIVE for temperature intolerance, skin/hair changes  HEME: NEGATIVE for bleeding problems  PSYCHIATRIC: NEGATIVE for changes in mood or affect    EXAM:   BP (!) 157/88 (BP Location: Left arm, Patient Position: Sitting, Cuff Size: Adult Regular)   Pulse 89   Temp 98.8  F (37.1  C) (Oral)   Ht 1.626 m (5' 4\")   Wt 72.1 kg (159 lb)   SpO2 98%   BMI 27.29 kg/m      GENERAL APPEARANCE: healthy, alert and no distress     EYES: EOMI, PERRL     HENT: ear canals and TM's normal and nose and mouth without ulcers or lesions     NECK: no adenopathy, no asymmetry, masses, or scars and thyroid normal to palpation     RESP: lungs clear to auscultation - no rales, rhonchi or wheezes     CV: regular rates and rhythm, normal S1 S2, no S3 or S4 and no murmur, click or rub     ABDOMEN:  soft, nontender, no HSM or masses and bowel sounds normal     MS: extremities normal- no gross deformities noted, no evidence of inflammation in joints, FROM in all extremities.     SKIN: no suspicious lesions or rashes     NEURO: Normal strength and tone, sensory exam grossly normal, mentation intact and speech normal     PSYCH: mentation appears normal. and affect normal/bright     LYMPHATICS: No cervical adenopathy    DIAGNOSTICS:     EKG: appears normal, NSR, normal axis, normal intervals, no acute ST/T changes c/w ischemia, no LVH by voltage criteria, nonspecific ST-T " changes  Labs Drawn and in Process:   Unresulted Labs Ordered in the Past 30 Days of this Admission     No orders found from 9/21/2020 to 10/22/2020.          Recent Labs   Lab Test 08/05/20  0549 08/05/20  0109 08/04/20 2020   HGB 13.1  --  15.2     --  243     --  136   POTASSIUM 3.3* 3.5 3.3*   CR 0.75  --  0.89        IMPRESSION:   Reason for surgery/procedure: nephrolithiasis- Right percutaneous nephrolithotomy, Flexible cystoscopy with right sided ureteral stent placement  Diagnosis/reason for consult: preoperative evaluation/ clearance      The proposed surgical procedure is considered INTERMEDIATE risk.    REVISED CARDIAC RISK INDEX  The patient has the following serious cardiovascular risks for perioperative complications such as (MI, PE, VFib and 3  AV Block):  No serious cardiac risks  INTERPRETATION: 0 risks: Class I (very low risk - 0.4% complication rate)    The patient has the following additional risks for perioperative complications:  No identified additional risks      ICD-10-CM    1. Preop general physical exam  Z01.818 EKG 12-lead complete w/read - Clinics       RECOMMENDATIONS:         --Patient is to take all scheduled medications on the day of surgery EXCEPT for modifications listed below.  Hold ASA and NSAID for one week prior to surgery   Start Propranolol 10 mg tid for h/o SVT, anxiety and HTN     APPROVAL GIVEN to proceed with proposed procedure, without further diagnostic evaluation       Signed Electronically by: Thomas Solomon MD    Copy of this evaluation report is provided to requesting physician.    [facility] Preop Guidelines    Revised Cardiac Risk Index

## 2020-10-28 ENCOUNTER — TELEPHONE (OUTPATIENT)
Dept: INTERNAL MEDICINE | Facility: CLINIC | Age: 70
End: 2020-10-28

## 2020-10-28 DIAGNOSIS — E78.2 MIXED HYPERLIPIDEMIA: ICD-10-CM

## 2020-10-28 NOTE — LETTER
[facility]  [address]  [facility] [address]  Phone: [phone]        November 2, 2020      Carola Cox                                                                                                                                [address] [facility] [address]            Dear Ms. Cox,    We are concerned about your health care.  We recently provided you with a medication refill.  Many medications require routine follow-up with your Doctor.      At this time we ask that: You schedule a routine office visit with your physician to follow your cholesterol      Your prescription: Has been refilled for 1 time  so you may have time for the above noted follow-up.      Thank you,      [facility] / BONNIE Sue MD.

## 2020-10-29 RX ORDER — SIMVASTATIN 20 MG
TABLET ORAL
Qty: 90 TABLET | Refills: 0 | Status: SHIPPED | OUTPATIENT
Start: 2020-10-29 | End: 2021-01-22

## 2020-10-29 NOTE — TELEPHONE ENCOUNTER
Medication is being filled for 1 time refill only due to:  Patient needs labs for cholesterol. Future labs ordered for lipid panel.     Please contact patient to schedule a lab only appointment for fasting labs.

## 2020-10-31 DIAGNOSIS — Z11.59 ENCOUNTER FOR SCREENING FOR OTHER VIRAL DISEASES: ICD-10-CM

## 2020-10-31 PROCEDURE — U0003 INFECTIOUS AGENT DETECTION BY NUCLEIC ACID (DNA OR RNA); SEVERE ACUTE RESPIRATORY SYNDROME CORONAVIRUS 2 (SARS-COV-2) (CORONAVIRUS DISEASE [COVID-19]), AMPLIFIED PROBE TECHNIQUE, MAKING USE OF HIGH THROUGHPUT TECHNOLOGIES AS DESCRIBED BY CMS-2020-01-R: HCPCS | Performed by: UROLOGY

## 2020-11-02 LAB
SARS-COV-2 RNA SPEC QL NAA+PROBE: NOT DETECTED
SPECIMEN SOURCE: NORMAL

## 2020-11-02 RX ORDER — CHOLECALCIFEROL (VITAMIN D3) 50 MCG
1 TABLET ORAL DAILY
COMMUNITY
End: 2021-06-18

## 2020-11-02 RX ORDER — TRIAMCINOLONE ACETONIDE 55 UG/1
1 SPRAY, METERED NASAL DAILY
COMMUNITY
End: 2021-10-11

## 2020-11-02 NOTE — PROGRESS NOTES
PTA medications updated by Medication Scribe prior to surgery via phone call with patient      -LAST DOSES ENTERED BY NURSE-    Comments:    Medication history sources: Patient, Surescripts and H&P  Medication history source reliability: Moderate  Adherence assessment: N/A Not Observed    Significant changes made to the medication list:  None      Additional medication history information:   None        Prior to Admission medications    Medication Sig Last Dose Taking? Auth Provider   albuterol (VENTOLIN HFA) 108 (90 Base) MCG/ACT inhaler INHALE 2 PUFFS INTO THE LUNGS EVERY 6 HOURS PRN  at PRN Yes    calcium citrate and vitamin D (CITRACAL) 200-250 MG-UNIT TABS per tablet Take 1 tablet by mouth daily  at AM Yes Reported, Patient   famotidine (PEPCID) 20 MG tablet Take 20 mg by mouth nightly as needed   at PM Yes Reported, Patient   meclizine (ANTIVERT) 25 MG tablet Take 1 tablet (25 mg) by mouth 3 times daily as needed for dizziness  at PRN Yes Leann Sue MD   propranolol (INDERAL) 10 MG tablet Take 1 tablet (10 mg) by mouth 3 times daily  Patient taking differently: Take 5 mg by mouth daily as needed (10mg x 0.5 = 5mg)  at PRN Yes Thomas Solomon MD   simvastatin (ZOCOR) 20 MG tablet TAKE 1 TABLET BY MOUTH EVERYDAY AT BEDTIME  at PM Yes Leann Sue MD   triamcinolone (NASACORT) 55 MCG/ACT nasal aerosol Spray 1 spray into both nostrils daily  at AM Yes Reported, Patient   vitamin D3 (CHOLECALCIFEROL) 50 mcg (2000 units) tablet Take 1 tablet by mouth daily  at PM Yes Reported, Patient

## 2020-11-03 ENCOUNTER — ANESTHESIA (OUTPATIENT)
Dept: SURGERY | Facility: CLINIC | Age: 70
End: 2020-11-03
Payer: MEDICARE

## 2020-11-03 ENCOUNTER — APPOINTMENT (OUTPATIENT)
Dept: GENERAL RADIOLOGY | Facility: CLINIC | Age: 70
End: 2020-11-03
Attending: UROLOGY
Payer: MEDICARE

## 2020-11-03 ENCOUNTER — ANESTHESIA EVENT (OUTPATIENT)
Dept: SURGERY | Facility: CLINIC | Age: 70
End: 2020-11-03
Payer: MEDICARE

## 2020-11-03 ENCOUNTER — HOSPITAL ENCOUNTER (OUTPATIENT)
Facility: CLINIC | Age: 70
LOS: 1 days | Discharge: HOME | End: 2020-11-04
Attending: UROLOGY | Admitting: UROLOGY
Payer: MEDICARE

## 2020-11-03 ENCOUNTER — APPOINTMENT (OUTPATIENT)
Dept: INTERVENTIONAL RADIOLOGY/VASCULAR | Facility: CLINIC | Age: 70
End: 2020-11-03
Attending: UROLOGY
Payer: MEDICARE

## 2020-11-03 DIAGNOSIS — N20.0 NEPHROLITHIASIS: ICD-10-CM

## 2020-11-03 DIAGNOSIS — N20.0 STONE, KIDNEY: ICD-10-CM

## 2020-11-03 LAB
ABO + RH BLD: NORMAL
ABO + RH BLD: NORMAL
BLD GP AB SCN SERPL QL: NORMAL
BLOOD BANK CMNT PATIENT-IMP: NORMAL
COPATH REPORT: NORMAL
HGB BLD-MCNC: 15.2 G/DL (ref 11.7–15.7)
POTASSIUM SERPL-SCNC: 3.4 MMOL/L (ref 3.4–5.3)
SPECIMEN EXP DATE BLD: NORMAL

## 2020-11-03 PROCEDURE — 250N000011 HC RX IP 250 OP 636: Performed by: NURSE ANESTHETIST, CERTIFIED REGISTERED

## 2020-11-03 PROCEDURE — 82365 CALCULUS SPECTROSCOPY: CPT | Performed by: UROLOGY

## 2020-11-03 PROCEDURE — 50080 PERQ NL/PL LITHOTRP SMPL<2CM: CPT | Mod: RT | Performed by: UROLOGY

## 2020-11-03 PROCEDURE — 360N000033 HC SURGERY LEVEL 5 EA 15 ADDTL MIN: Performed by: UROLOGY

## 2020-11-03 PROCEDURE — C1726 CATH, BAL DIL, NON-VASCULAR: HCPCS | Performed by: UROLOGY

## 2020-11-03 PROCEDURE — 250N000013 HC RX MED GY IP 250 OP 250 PS 637: Mod: GY | Performed by: UROLOGY

## 2020-11-03 PROCEDURE — 258N000003 HC RX IP 258 OP 636: Performed by: UROLOGY

## 2020-11-03 PROCEDURE — 250N000012 HC RX MED GY IP 250 OP 636 PS 637: Mod: GZ | Performed by: ANESTHESIOLOGY

## 2020-11-03 PROCEDURE — 250N000009 HC RX 250: Performed by: NURSE ANESTHETIST, CERTIFIED REGISTERED

## 2020-11-03 PROCEDURE — 272N000001 HC OR GENERAL SUPPLY STERILE: Performed by: UROLOGY

## 2020-11-03 PROCEDURE — 370N000002 HC ANESTHESIA TECHNICAL FEE, EACH ADDTL 15 MIN: Performed by: UROLOGY

## 2020-11-03 PROCEDURE — C1887 CATHETER, GUIDING: HCPCS | Performed by: UROLOGY

## 2020-11-03 PROCEDURE — 88300 SURGICAL PATH GROSS: CPT | Mod: 26 | Performed by: PATHOLOGY

## 2020-11-03 PROCEDURE — 88300 SURGICAL PATH GROSS: CPT | Mod: TC | Performed by: UROLOGY

## 2020-11-03 PROCEDURE — 86900 BLOOD TYPING SEROLOGIC ABO: CPT | Performed by: ANESTHESIOLOGY

## 2020-11-03 PROCEDURE — 250N000009 HC RX 250: Performed by: ANESTHESIOLOGY

## 2020-11-03 PROCEDURE — 999N000083 IR RENAL STONE REMOVAL RIGHT

## 2020-11-03 PROCEDURE — C2617 STENT, NON-COR, TEM W/O DEL: HCPCS | Performed by: UROLOGY

## 2020-11-03 PROCEDURE — 84132 ASSAY OF SERUM POTASSIUM: CPT | Performed by: ANESTHESIOLOGY

## 2020-11-03 PROCEDURE — 999N000138 HC STATISTIC PRE-PROCEDURE ASSESSMENT I: Performed by: UROLOGY

## 2020-11-03 PROCEDURE — 761N000001 HC RECOVERY PHASE 1 LEVEL 1 FIRST HR: Performed by: UROLOGY

## 2020-11-03 PROCEDURE — 85018 HEMOGLOBIN: CPT | Performed by: ANESTHESIOLOGY

## 2020-11-03 PROCEDURE — 86901 BLOOD TYPING SEROLOGIC RH(D): CPT | Performed by: ANESTHESIOLOGY

## 2020-11-03 PROCEDURE — C1769 GUIDE WIRE: HCPCS | Performed by: UROLOGY

## 2020-11-03 PROCEDURE — 360N000032 HC SURGERY LEVEL 5 1ST 30 MIN: Performed by: UROLOGY

## 2020-11-03 PROCEDURE — 250N000011 HC RX IP 250 OP 636: Performed by: UROLOGY

## 2020-11-03 PROCEDURE — 370N000001 HC ANESTHESIA TECHNICAL FEE, 1ST 30 MIN: Performed by: UROLOGY

## 2020-11-03 PROCEDURE — 258N000003 HC RX IP 258 OP 636: Performed by: ANESTHESIOLOGY

## 2020-11-03 PROCEDURE — 86850 RBC ANTIBODY SCREEN: CPT | Performed by: ANESTHESIOLOGY

## 2020-11-03 PROCEDURE — 52356 CYSTO/URETERO W/LITHOTRIPSY: CPT | Mod: 59 | Performed by: UROLOGY

## 2020-11-03 PROCEDURE — 250N000003 HC SEVOFLURANE, EA 15 MIN: Performed by: UROLOGY

## 2020-11-03 PROCEDURE — 36415 COLL VENOUS BLD VENIPUNCTURE: CPT | Performed by: ANESTHESIOLOGY

## 2020-11-03 PROCEDURE — 74019 RADEX ABDOMEN 2 VIEWS: CPT

## 2020-11-03 DEVICE — STENT URETERAL CONTOUR SOFT PERCUFLEX 6FRX26CM: Type: IMPLANTABLE DEVICE | Site: URETHRA | Status: FUNCTIONAL

## 2020-11-03 RX ORDER — MECLIZINE HYDROCHLORIDE 25 MG/1
25 TABLET ORAL 3 TIMES DAILY PRN
Status: DISCONTINUED | OUTPATIENT
Start: 2020-11-03 | End: 2020-11-04 | Stop reason: HOSPADM

## 2020-11-03 RX ORDER — FAMOTIDINE 20 MG/1
20 TABLET, FILM COATED ORAL
Status: DISCONTINUED | OUTPATIENT
Start: 2020-11-03 | End: 2020-11-04 | Stop reason: HOSPADM

## 2020-11-03 RX ORDER — PROPOFOL 10 MG/ML
INJECTION, EMULSION INTRAVENOUS CONTINUOUS PRN
Status: DISCONTINUED | OUTPATIENT
Start: 2020-11-03 | End: 2020-11-03

## 2020-11-03 RX ORDER — ONDANSETRON 2 MG/ML
4 INJECTION INTRAMUSCULAR; INTRAVENOUS EVERY 30 MIN PRN
Status: DISCONTINUED | OUTPATIENT
Start: 2020-11-03 | End: 2020-11-03

## 2020-11-03 RX ORDER — PROPOFOL 10 MG/ML
INJECTION, EMULSION INTRAVENOUS PRN
Status: DISCONTINUED | OUTPATIENT
Start: 2020-11-03 | End: 2020-11-03

## 2020-11-03 RX ORDER — ONDANSETRON 2 MG/ML
INJECTION INTRAMUSCULAR; INTRAVENOUS PRN
Status: DISCONTINUED | OUTPATIENT
Start: 2020-11-03 | End: 2020-11-03

## 2020-11-03 RX ORDER — ONDANSETRON 2 MG/ML
4 INJECTION INTRAMUSCULAR; INTRAVENOUS EVERY 6 HOURS PRN
Status: DISCONTINUED | OUTPATIENT
Start: 2020-11-03 | End: 2020-11-04 | Stop reason: HOSPADM

## 2020-11-03 RX ORDER — NALOXONE HYDROCHLORIDE 0.4 MG/ML
.1-.4 INJECTION, SOLUTION INTRAMUSCULAR; INTRAVENOUS; SUBCUTANEOUS
Status: ACTIVE | OUTPATIENT
Start: 2020-11-03 | End: 2020-11-04

## 2020-11-03 RX ORDER — SODIUM CHLORIDE, SODIUM LACTATE, POTASSIUM CHLORIDE, CALCIUM CHLORIDE 600; 310; 30; 20 MG/100ML; MG/100ML; MG/100ML; MG/100ML
INJECTION, SOLUTION INTRAVENOUS CONTINUOUS
Status: DISCONTINUED | OUTPATIENT
Start: 2020-11-03 | End: 2020-11-04 | Stop reason: HOSPADM

## 2020-11-03 RX ORDER — FLUTICASONE PROPIONATE 50 MCG
2 SPRAY, SUSPENSION (ML) NASAL DAILY
Status: DISCONTINUED | OUTPATIENT
Start: 2020-11-03 | End: 2020-11-04 | Stop reason: HOSPADM

## 2020-11-03 RX ORDER — SIMVASTATIN 20 MG
20 TABLET ORAL AT BEDTIME
Status: DISCONTINUED | OUTPATIENT
Start: 2020-11-03 | End: 2020-11-04 | Stop reason: HOSPADM

## 2020-11-03 RX ORDER — EPHEDRINE SULFATE 50 MG/ML
INJECTION, SOLUTION INTRAMUSCULAR; INTRAVENOUS; SUBCUTANEOUS PRN
Status: DISCONTINUED | OUTPATIENT
Start: 2020-11-03 | End: 2020-11-03

## 2020-11-03 RX ORDER — ALBUTEROL SULFATE 90 UG/1
2 AEROSOL, METERED RESPIRATORY (INHALATION) EVERY 4 HOURS PRN
Status: DISCONTINUED | OUTPATIENT
Start: 2020-11-03 | End: 2020-11-04 | Stop reason: HOSPADM

## 2020-11-03 RX ORDER — APREPITANT 40 MG/1
40 CAPSULE ORAL DAILY
Status: DISCONTINUED | OUTPATIENT
Start: 2020-11-03 | End: 2020-11-03

## 2020-11-03 RX ORDER — SCOLOPAMINE TRANSDERMAL SYSTEM 1 MG/1
1 PATCH, EXTENDED RELEASE TRANSDERMAL ONCE
Status: COMPLETED | OUTPATIENT
Start: 2020-11-03 | End: 2020-11-04

## 2020-11-03 RX ORDER — ALBUTEROL SULFATE 0.83 MG/ML
2.5 SOLUTION RESPIRATORY (INHALATION) EVERY 4 HOURS PRN
Status: DISCONTINUED | OUTPATIENT
Start: 2020-11-03 | End: 2020-11-04 | Stop reason: HOSPADM

## 2020-11-03 RX ORDER — ONDANSETRON 4 MG/1
4 TABLET, ORALLY DISINTEGRATING ORAL EVERY 30 MIN PRN
Status: DISCONTINUED | OUTPATIENT
Start: 2020-11-03 | End: 2020-11-03

## 2020-11-03 RX ORDER — CEFAZOLIN SODIUM 2 G/100ML
2 INJECTION, SOLUTION INTRAVENOUS
Status: COMPLETED | OUTPATIENT
Start: 2020-11-03 | End: 2020-11-03

## 2020-11-03 RX ORDER — LIDOCAINE 40 MG/G
CREAM TOPICAL
Status: DISCONTINUED | OUTPATIENT
Start: 2020-11-03 | End: 2020-11-04 | Stop reason: HOSPADM

## 2020-11-03 RX ORDER — OXYBUTYNIN CHLORIDE 5 MG/1
5 TABLET ORAL EVERY 8 HOURS PRN
Status: DISCONTINUED | OUTPATIENT
Start: 2020-11-03 | End: 2020-11-04 | Stop reason: HOSPADM

## 2020-11-03 RX ORDER — LIDOCAINE HYDROCHLORIDE 20 MG/ML
INJECTION, SOLUTION INFILTRATION; PERINEURAL PRN
Status: DISCONTINUED | OUTPATIENT
Start: 2020-11-03 | End: 2020-11-03

## 2020-11-03 RX ORDER — FENTANYL CITRATE 50 UG/ML
25-50 INJECTION, SOLUTION INTRAMUSCULAR; INTRAVENOUS
Status: DISCONTINUED | OUTPATIENT
Start: 2020-11-03 | End: 2020-11-03

## 2020-11-03 RX ORDER — DEXAMETHASONE SODIUM PHOSPHATE 4 MG/ML
INJECTION, SOLUTION INTRA-ARTICULAR; INTRALESIONAL; INTRAMUSCULAR; INTRAVENOUS; SOFT TISSUE PRN
Status: DISCONTINUED | OUTPATIENT
Start: 2020-11-03 | End: 2020-11-03

## 2020-11-03 RX ORDER — HYDROMORPHONE HYDROCHLORIDE 1 MG/ML
.3-.5 INJECTION, SOLUTION INTRAMUSCULAR; INTRAVENOUS; SUBCUTANEOUS EVERY 5 MIN PRN
Status: DISCONTINUED | OUTPATIENT
Start: 2020-11-03 | End: 2020-11-03

## 2020-11-03 RX ORDER — HYDRALAZINE HYDROCHLORIDE 20 MG/ML
2.5-5 INJECTION INTRAMUSCULAR; INTRAVENOUS EVERY 10 MIN PRN
Status: DISCONTINUED | OUTPATIENT
Start: 2020-11-03 | End: 2020-11-03

## 2020-11-03 RX ORDER — CEFAZOLIN SODIUM 1 G/3ML
1 INJECTION, POWDER, FOR SOLUTION INTRAMUSCULAR; INTRAVENOUS SEE ADMIN INSTRUCTIONS
Status: DISCONTINUED | OUTPATIENT
Start: 2020-11-03 | End: 2020-11-03

## 2020-11-03 RX ORDER — ACETAMINOPHEN 325 MG/1
325-650 TABLET ORAL EVERY 6 HOURS PRN
Status: DISCONTINUED | OUTPATIENT
Start: 2020-11-03 | End: 2020-11-04 | Stop reason: HOSPADM

## 2020-11-03 RX ORDER — SODIUM CHLORIDE, SODIUM LACTATE, POTASSIUM CHLORIDE, CALCIUM CHLORIDE 600; 310; 30; 20 MG/100ML; MG/100ML; MG/100ML; MG/100ML
INJECTION, SOLUTION INTRAVENOUS CONTINUOUS
Status: DISCONTINUED | OUTPATIENT
Start: 2020-11-03 | End: 2020-11-03

## 2020-11-03 RX ORDER — HYDROCODONE BITARTRATE AND ACETAMINOPHEN 5; 325 MG/1; MG/1
1 TABLET ORAL EVERY 6 HOURS PRN
Status: DISCONTINUED | OUTPATIENT
Start: 2020-11-03 | End: 2020-11-04 | Stop reason: HOSPADM

## 2020-11-03 RX ORDER — FENTANYL CITRATE 50 UG/ML
INJECTION, SOLUTION INTRAMUSCULAR; INTRAVENOUS PRN
Status: DISCONTINUED | OUTPATIENT
Start: 2020-11-03 | End: 2020-11-03

## 2020-11-03 RX ADMIN — SIMVASTATIN 20 MG: 20 TABLET, FILM COATED ORAL at 22:03

## 2020-11-03 RX ADMIN — PROPOFOL 25 MCG/KG/MIN: 10 INJECTION, EMULSION INTRAVENOUS at 08:02

## 2020-11-03 RX ADMIN — ACETAMINOPHEN 650 MG: 325 TABLET, FILM COATED ORAL at 22:03

## 2020-11-03 RX ADMIN — LIDOCAINE HYDROCHLORIDE 100 MG: 20 INJECTION, SOLUTION INFILTRATION; PERINEURAL at 07:57

## 2020-11-03 RX ADMIN — DEXAMETHASONE SODIUM PHOSPHATE 4 MG: 4 INJECTION, SOLUTION INTRA-ARTICULAR; INTRALESIONAL; INTRAMUSCULAR; INTRAVENOUS; SOFT TISSUE at 08:07

## 2020-11-03 RX ADMIN — APREPITANT 40 MG: 40 CAPSULE ORAL at 07:28

## 2020-11-03 RX ADMIN — ROCURONIUM BROMIDE 50 MG: 10 INJECTION INTRAVENOUS at 07:57

## 2020-11-03 RX ADMIN — FENTANYL CITRATE 50 MCG: 50 INJECTION, SOLUTION INTRAMUSCULAR; INTRAVENOUS at 07:57

## 2020-11-03 RX ADMIN — CEFAZOLIN SODIUM 2 G: 2 INJECTION, SOLUTION INTRAVENOUS at 08:04

## 2020-11-03 RX ADMIN — PROPOFOL 50 MG: 10 INJECTION, EMULSION INTRAVENOUS at 08:42

## 2020-11-03 RX ADMIN — FENTANYL CITRATE 50 MCG: 50 INJECTION, SOLUTION INTRAMUSCULAR; INTRAVENOUS at 08:59

## 2020-11-03 RX ADMIN — SCOPALAMINE 1 PATCH: 1 PATCH, EXTENDED RELEASE TRANSDERMAL at 07:28

## 2020-11-03 RX ADMIN — SUGAMMADEX 150 MG: 100 INJECTION, SOLUTION INTRAVENOUS at 10:40

## 2020-11-03 RX ADMIN — SODIUM CHLORIDE, POTASSIUM CHLORIDE, SODIUM LACTATE AND CALCIUM CHLORIDE: 600; 310; 30; 20 INJECTION, SOLUTION INTRAVENOUS at 13:44

## 2020-11-03 RX ADMIN — PROPOFOL 200 MG: 10 INJECTION, EMULSION INTRAVENOUS at 07:57

## 2020-11-03 RX ADMIN — Medication 10 MG: at 08:36

## 2020-11-03 RX ADMIN — ONDANSETRON 4 MG: 2 INJECTION INTRAMUSCULAR; INTRAVENOUS at 08:24

## 2020-11-03 RX ADMIN — MIDAZOLAM 1 MG: 1 INJECTION INTRAMUSCULAR; INTRAVENOUS at 07:51

## 2020-11-03 RX ADMIN — ACETAMINOPHEN 650 MG: 325 TABLET, FILM COATED ORAL at 15:57

## 2020-11-03 RX ADMIN — PROCHLORPERAZINE EDISYLATE 5 MG: 5 INJECTION INTRAMUSCULAR; INTRAVENOUS at 13:45

## 2020-11-03 RX ADMIN — SODIUM CHLORIDE, POTASSIUM CHLORIDE, SODIUM LACTATE AND CALCIUM CHLORIDE: 600; 310; 30; 20 INJECTION, SOLUTION INTRAVENOUS at 07:04

## 2020-11-03 ASSESSMENT — ENCOUNTER SYMPTOMS
DYSRHYTHMIAS: 0
SEIZURES: 0

## 2020-11-03 ASSESSMENT — LIFESTYLE VARIABLES: TOBACCO_USE: 1

## 2020-11-03 ASSESSMENT — MIFFLIN-ST. JEOR: SCORE: 1211.68

## 2020-11-03 NOTE — ANESTHESIA CARE TRANSFER NOTE
Patient: Carola Cox    Procedure(s):  Right percutaneous nephrolithotomy, Holmium laser lithotripsy, stent placement  Flexible cystoscopy with right sided ureteral catheter and stent placement  Laser holmium nephrolithotomy via percutaneous nephrostomy, stent insertion    Diagnosis: Nephrolithiasis [N20.0]  Diagnosis Additional Information: No value filed.    Anesthesia Type:   General     Note:  Airway :Face Mask  Patient transferred to:PACU  Handoff Report: Identifed the Patient, Identified the Reponsible Provider, Reviewed the pertinent medical history, Discussed the surgical course, Reviewed Intra-OP anesthesia mangement and issues during anesthesia, Set expectations for post-procedure period and Allowed opportunity for questions and acknowledgement of understanding      Vitals: (Last set prior to Anesthesia Care Transfer)    CRNA VITALS  11/3/2020 1020 - 11/3/2020 1054      11/3/2020             Pulse:  95    SpO2:  96 %    Resp Rate (observed):  6                Electronically Signed By: KENA Singh CRNA  November 3, 2020  10:54 AM

## 2020-11-03 NOTE — ANESTHESIA PREPROCEDURE EVALUATION
Anesthesia Pre-Procedure Evaluation    Patient: Carola Cox   MRN: [id number] : 1950          Preoperative Diagnosis: Nephrolithiasis [N20.0]    Procedure(s):  Right percutaneous nephrolithotomy  Flexible cystoscopy with right sided ureteral stent placement    Past Medical History:   Diagnosis Date     Atrophic vaginitis      BPPV (benign paroxysmal positional vertigo)      Cardiac dysrhythmia     beta blocker for this in past     Complication of anesthesia      Endometriosis of pelvic peritoneum      Generalized anxiety disorder 2014    cannot tolerate SSRIs. Trial of 12.5mg sertraline and had severe nausea, diarrhea, dizziness. low dose propranolol prn and counseling      History of postmenopausal HRT      Hyperlipidemia      Mumps      Osteoporosis      Palpitations      PONV (postoperative nausea and vomiting)      Past Surgical History:   Procedure Laterality Date     CYSTOSCOPY       HYSTERECTOMY TOTAL ABDOMINAL, BILATERAL SALPINGO-OOPHORECTOMY, COMBINED      Endometriosis.     LITHOTRIPSY       Allergies   Allergen Reactions     Cefprozil Hives     Cefzil     Penicillin V Rash     As a young adult       Prior to Admission medications    Medication Sig Start Date End Date Taking? Authorizing Provider   calcium citrate and vitamin D (CITRACAL) 200-250 MG-UNIT TABS per tablet Take 1 tablet by mouth daily   Yes Reported, Patient   famotidine (PEPCID) 20 MG tablet Take 20 mg by mouth nightly as needed    Yes Reported, Patient   propranolol (INDERAL) 10 MG tablet Take 1 tablet (10 mg) by mouth 3 times daily  Patient taking differently: Take 5 mg by mouth daily as needed (10mg x 0.5 = 5mg) 10/21/20  Yes Thomas Solomon MD   simvastatin (ZOCOR) 20 MG tablet TAKE 1 TABLET BY MOUTH EVERYDAY AT BEDTIME 10/29/20  Yes Leann Sue MD   triamcinolone (NASACORT) 55 MCG/ACT nasal aerosol Spray 1 spray into both nostrils daily   Yes Reported, Patient   vitamin D3 (CHOLECALCIFEROL) 50 mcg (  units) tablet Take 1 tablet by mouth daily   Yes Reported, Patient   albuterol (VENTOLIN HFA) 108 (90 Base) MCG/ACT inhaler INHALE 2 PUFFS INTO THE LUNGS EVERY 6 HOURS PRN 9/10/20      meclizine (ANTIVERT) 25 MG tablet Take 1 tablet (25 mg) by mouth 3 times daily as needed for dizziness 11/13/17   Leann Sue MD     RECENT LABS:   ECG:   ECHO:   CXR:      Anesthesia Evaluation     . Pt has had prior anesthetic. Type: General    History of anesthetic complications   - PONV        ROS/MED HX    ENT/Pulmonary:     (+)tobacco use, Past use , . .   (-) asthma and sleep apnea   Neurologic:     (+)other neuro vertigo   (-) seizures, CVA, Neuropathy and migraines   Cardiovascular:     (+) Dyslipidemia, ----. : . . . :. .      (-) hypertension, CAD, RAUSCH, arrhythmias and valvular problems/murmurs   METS/Exercise Tolerance:  >4 METS   Hematologic:        (-) history of blood clots, anemia and other hematologic disorder   Musculoskeletal:   (+)  other musculoskeletal- osteoporosis     (-) arthritis   GI/Hepatic:        (-) GERD and liver disease   Renal/Genitourinary:     (+) Nephrolithiasis , Other Renal/ Genitourinary, endometriosis   (-) renal disease   Endo:      (-) Type I DM, Type II DM, thyroid disease and obesity   Psychiatric:     (+) psychiatric history anxiety      Infectious Disease:        (-) Recent Fever   Malignancy:         Other:                          Physical Exam  Normal systems: cardiovascular, pulmonary and dental    Airway   Mallampati: II  TM distance: >3 FB    Dental     Cardiovascular   Rhythm and rate: regular and normal      Pulmonary    breath sounds clear to auscultation            Lab Results   Component Value Date    WBC 7.8 08/05/2020    HGB 15.2 11/03/2020    HCT 41.8 08/05/2020     08/05/2020     08/05/2020    POTASSIUM 3.4 11/03/2020    CHLORIDE 109 08/05/2020    CO2 28 08/05/2020    BUN 12 08/05/2020    CR 0.75 08/05/2020    GLC 95 08/05/2020    PRINCE 8.6  "08/05/2020    MAG 2.4 (H) 07/22/2019    ALBUMIN 3.9 11/06/2019    PROTTOTAL 7.3 11/06/2019    ALT 35 11/06/2019    AST 26 11/06/2019    ALKPHOS 67 11/06/2019    BILITOTAL 0.4 11/06/2019    TSH 0.95 07/12/2019       Preop Vitals  BP Readings from Last 3 Encounters:   11/03/20 139/76   10/21/20 (!) 157/88   08/05/20 125/55    Pulse Readings from Last 3 Encounters:   11/03/20 83   10/21/20 89   08/04/20 78      Resp Readings from Last 3 Encounters:   11/03/20 16   08/05/20 18   11/12/19 16    SpO2 Readings from Last 3 Encounters:   11/03/20 99%   10/21/20 98%   08/05/20 95%      Temp Readings from Last 1 Encounters:   11/03/20 37.2  C (99  F) (Temporal)    Ht Readings from Last 1 Encounters:   11/03/20 1.626 m (5' 4\")      Wt Readings from Last 1 Encounters:   11/03/20 71.7 kg (158 lb)    Estimated body mass index is 27.12 kg/m  as calculated from the following:    Height as of this encounter: 1.626 m (5' 4\").    Weight as of this encounter: 71.7 kg (158 lb).       Anesthesia Plan      History & Physical Review      ASA Status:  2 .    NPO Status:  > 8 hours    Plan for General with Propofol induction. Maintenance will be Balanced.    PONV prophylaxis:  Ondansetron (or other 5HT-3), Dexamethasone or Solumedrol, Scopolamine patch and Aprepitant  Propofol infusion  Dilaudid, ketorolac          Postoperative Care  Postoperative pain management:  Multi-modal analgesia.      Consents  Anesthetic plan, risks, benefits and alternatives discussed with:  Patient..                 Cris Garcia MD  "

## 2020-11-03 NOTE — OP NOTE
Procedure Date: 11/03/2020      SURGEON:  Chip Clark MD      PREOPERATIVE DIAGNOSIS:  Right kidney stones.      POSTOPERATIVE DIAGNOSIS:  Right kidney stones.      PROCEDURE PERFORMED:  Cystoscopy, placement of temporary right ureteral catheter, right percutaneous nephrolithotomy.      ANESTHESIA:  General.      COMPLICATIONS:  None.      DRAINS:  A 22 [place]ian Bazan as a nephrostomy tube, Bazan catheter in the bladder, a 6 x 26 double-J right ureteral stent.        INDICATIONS FOR PROCEDURE:  Carola Cox is a 70-year-old woman who presents with a large stone in the right kidney.  She has stones in her left kidney as well that will be dealt with at a later date.  She now presents for removal of her right kidney stones via percutaneous nephrolithotomy.      DETAILS OF THE PROCEDURE:  The risks and benefits of the procedure were explained in detail to patient and informed consent was obtained.  The patient was brought to the operating room, placed supine on the table and underwent general endotracheal anesthetic.  She was then frog legged and the perineum was prepped and draped in standard sterile fashion.      I introduced the flexible cystoscope and performed cystoscopy.  There were no urothelial abnormalities identified.  I cannulated the right ureteral orifice with a Glidewire and backed off the scope.  I passed a TigerTail catheter over the Glidewire until resistance was felt and then I removed the inner Glidewire.  I reinserted the scope to make certain that the ureteral catheter was in good position.  I drained the bladder with the Bazan catheter.  I then pulled back the ureteral catheter about 5 cm.  I was at the proximal right ureter.  I then secured the ureteral catheter to the Bazan catheter.  The patient was then moved to the prone position and the right flank was prepped and draped in standard sterile fashion.      Dr. Laura Solano with Interventional Radiology now gained access to the  middle adriana of the right kidney.  We performed a retrograde pyelogram through the existing ureteral catheter and injected CO2 and the stone was indeed located in the upper pole adriana.  She had a nice lower pole adriana but, unfortunately, there were no posterior-facing lower pole calices.  Therefore, middle adriana access was preferred as this was nicely posterior facing.  Please see Dr. Solano' dictation for this part of the case.  After she placed a 30 [place] coaxial sheath into the middle adriana.  I used the rigid nephroscope to perform nephroscopy.  I could get a good angle on the stone in order to get to the upper pole, so I inserted the flexible cystoscope.  With the flexible cystoscope, I was able to gain access to the upper pole and see the stone.  I temporarily passed a Glidewire into the upper pole and backed off the rigid cystoscope.  I then tried to follow the Glidewire into the rigid nephroscope but I could not get a good enough angle all the way into the upper pole calyx.        Therefore, I decided I would laser the stone with the flexible cystoscope.  I left the Glidewire in place and used it to guide the flexible cystoscope into the upper pole.  The stone was identified.  I used a 200- micron holmium laser fiber to break up the stone into multiple fragments.  These fragments were then basketed out through the sheath.  Once I had removed all visible stone, I visualized the right UPJ and passed a Glidewire into the bladder.  I backloaded off the scope.  Over this Glidewire, I then passed a 6 x 26 double-J ureteral stent.  The wire was pulled and a curl was seen in the renal pelvis.  I then passed a 22 [place]-tip catheter over the Amplatz Super Stiff wire that was running through the sheath.  I passed IT until the nose of the catheter that was at the UPJ.  I inflated the balloon with 2.5 mL of water using good placement in the lower pole and middle adriana.  I then cut the sheath and removed the  sheath.  I removed the safety wire and injected contrast through the nephrostomy tube to again ensure correct placement.  I then secured the nephrostomy tube to the back with 0 silk suture.  A drain sponge was placed at this site and the procedure was concluded.  I removed the ureteral catheter during the case as well, leaving the stent in place.  X-ray at the end of the case demonstrated a good curl of the stent in the bladder as well.        The patient tolerated the procedure without complications.  She went to post-anesthetic care in good condition.  She will go to the hospital for overnight monitoring from there.  I will look for her stone analysis.  If her stones on the left side are smaller, they may be amenable to shock wave lithotripsy depending on stone analysis results.  They were well visualized on x-ray today.         VENTURA SPENCE MD             D: 2020   T: 2020   MT: PAYTON      Name:     DESMOND SWENSON   MRN:      [id number]        Account:        [id number]   :      1950           Procedure Date: 2020      Document: [id number]

## 2020-11-03 NOTE — ANESTHESIA POSTPROCEDURE EVALUATION
Patient: Carola Cox    Procedure(s):  Right percutaneous nephrolithotomy, Holmium laser lithotripsy, stent placement  Flexible cystoscopy with right sided ureteral catheter and stent placement  Laser holmium nephrolithotomy via percutaneous nephrostomy, stent insertion    Diagnosis:Nephrolithiasis [N20.0]  Diagnosis Additional Information: No value filed.    Anesthesia Type:  General    Note:  Anesthesia Post Evaluation    Patient location during evaluation: PACU  Patient participation: Able to fully participate in evaluation  Level of consciousness: awake and alert  Pain management: adequate  Airway patency: patent  Cardiovascular status: acceptable, hemodynamically stable and blood pressure returned to baseline  Respiratory status: acceptable  Hydration status: acceptable  PONV: none     Anesthetic complications: None          Last vitals:  Vitals:    11/03/20 1221 11/03/20 1307 11/03/20 1347   BP: (!) 149/66 135/68 134/60   Pulse: 79 72 67   Resp: 15 15 16   Temp: 36.9  C (98.5  F)  36.3  C (97.3  F)   SpO2: 94% 95% 95%         Electronically Signed By: Cris Garcia MD  November 3, 2020  2:53 PM

## 2020-11-03 NOTE — ANESTHESIA PROCEDURE NOTES
Airway   Date/Time: 11/3/2020 7:59 AM   Patient location during procedure: OR    Staff -   CRNA: Brice Au APRN CRNA  Performed By: CRNA    Consent for Airway   Urgency: elective    Indications and Patient Condition  Indications for airway management: sima-procedural  Mask difficulty assessment: 2 - vent by mask + OA or adjuvant +/- NMBA    Final Airway Details  Final airway type: endotracheal airway  Successful airway:ETT - single  Endotracheal Airway Details   ETT size (mm): 7.0  Cuffed: yes  Successful intubation technique: direct laryngoscopy  Grade View of Cords: 3  Secured with: pink tape    Post intubation assessment   Placement verified by: capnometry, equal breath sounds and chest rise   Number of attempts at approach: 1  Secured with:pink tape  Ease of procedure: easy  Dentition: Intact and Unchanged

## 2020-11-04 VITALS
OXYGEN SATURATION: 97 % | TEMPERATURE: 98.5 F | DIASTOLIC BLOOD PRESSURE: 53 MMHG | SYSTOLIC BLOOD PRESSURE: 141 MMHG | HEART RATE: 75 BPM | WEIGHT: 158 LBS | RESPIRATION RATE: 16 BRPM | BODY MASS INDEX: 26.98 KG/M2 | HEIGHT: 64 IN

## 2020-11-04 LAB
ANION GAP SERPL CALCULATED.3IONS-SCNC: 5 MMOL/L (ref 3–14)
BUN SERPL-MCNC: 13 MG/DL (ref 7–30)
CALCIUM SERPL-MCNC: 9.1 MG/DL (ref 8.5–10.1)
CHLORIDE SERPL-SCNC: 108 MMOL/L (ref 94–109)
CO2 SERPL-SCNC: 26 MMOL/L (ref 20–32)
CREAT SERPL-MCNC: 0.85 MG/DL (ref 0.52–1.04)
ERYTHROCYTE [DISTWIDTH] IN BLOOD BY AUTOMATED COUNT: 13.4 % (ref 10–15)
GFR SERPL CREATININE-BSD FRML MDRD: 68 ML/MIN/{1.73_M2}
GLUCOSE SERPL-MCNC: 101 MG/DL (ref 70–99)
HCT VFR BLD AUTO: 41.6 % (ref 35–47)
HGB BLD-MCNC: 13.7 G/DL (ref 11.7–15.7)
MCH RBC QN AUTO: 30.7 PG (ref 26.5–33)
MCHC RBC AUTO-ENTMCNC: 32.9 G/DL (ref 31.5–36.5)
MCV RBC AUTO: 93 FL (ref 78–100)
PLATELET # BLD AUTO: 224 10E9/L (ref 150–450)
POTASSIUM SERPL-SCNC: 3.6 MMOL/L (ref 3.4–5.3)
RBC # BLD AUTO: 4.46 10E12/L (ref 3.8–5.2)
SODIUM SERPL-SCNC: 139 MMOL/L (ref 133–144)
WBC # BLD AUTO: 12.3 10E9/L (ref 4–11)

## 2020-11-04 PROCEDURE — 80048 BASIC METABOLIC PNL TOTAL CA: CPT | Performed by: UROLOGY

## 2020-11-04 PROCEDURE — 85027 COMPLETE CBC AUTOMATED: CPT | Performed by: UROLOGY

## 2020-11-04 PROCEDURE — 36415 COLL VENOUS BLD VENIPUNCTURE: CPT | Performed by: UROLOGY

## 2020-11-04 PROCEDURE — 258N000003 HC RX IP 258 OP 636: Performed by: UROLOGY

## 2020-11-04 RX ORDER — TAMSULOSIN HYDROCHLORIDE 0.4 MG/1
0.4 CAPSULE ORAL DAILY
Qty: 20 CAPSULE | Refills: 0 | Status: SHIPPED | OUTPATIENT
Start: 2020-11-04 | End: 2020-11-04

## 2020-11-04 RX ORDER — HYDROCODONE BITARTRATE AND ACETAMINOPHEN 5; 325 MG/1; MG/1
1 TABLET ORAL EVERY 6 HOURS PRN
Qty: 15 TABLET | Refills: 0 | Status: SHIPPED | OUTPATIENT
Start: 2020-11-04 | End: 2021-05-04

## 2020-11-04 RX ORDER — SENNOSIDES 8.6 MG
1-3 TABLET ORAL DAILY PRN
Qty: 20 TABLET | Refills: 0 | Status: SHIPPED | OUTPATIENT
Start: 2020-11-04 | End: 2021-05-04

## 2020-11-04 RX ADMIN — SODIUM CHLORIDE, POTASSIUM CHLORIDE, SODIUM LACTATE AND CALCIUM CHLORIDE: 600; 310; 30; 20 INJECTION, SOLUTION INTRAVENOUS at 00:30

## 2020-11-04 NOTE — PROGRESS NOTES
[facility] Urology Progress Note          Assessment and Plan:   Principal Problem:    Nephrolithiasis      Assessment: POD 1 Cystoscopy, placement of temporary right ureteral catheter, right percutaneous nephrolithotomy    Plan: NT removed and Bazan removed.   Home later today.      Evelyn Potter PA-C  [facility]rology  [phone]               Interval History:   doing well; NT clear, awaiting first void              Review of Systems:   The 5 point Review of Systems is negative other than noted in the HPI             Medications:     Current Facility-Administered Medications Ordered in Epic   Medication Dose Route Frequency Last Rate Last Dose     acetaminophen (TYLENOL) tablet 325-650 mg  325-650 mg Oral Q6H PRN   650 mg at 11/03/20 2203     albuterol (PROAIR HFA/PROVENTIL HFA/VENTOLIN HFA) 108 (90 Base) MCG/ACT inhaler 2 puff  2 puff Inhalation Q4H PRN         albuterol (PROVENTIL) neb solution 2.5 mg  2.5 mg Nebulization Q4H PRN         famotidine (PEPCID) tablet 20 mg  20 mg Oral At Bedtime PRN         fluticasone (FLONASE) 50 MCG/ACT spray 2 spray  2 spray Both Nostrils Daily         HYDROcodone-acetaminophen (NORCO) 5-325 MG per tablet 1 tablet  1 tablet Oral Q6H PRN         lactated ringers infusion   Intravenous Continuous 100 mL/hr at 11/04/20 0030       lidocaine (LMX4) cream   Topical Q1H PRN         lidocaine 1 % 0.1-1 mL  0.1-1 mL Other Q1H PRN         lidocaine 1 % 0.1-1 mL  0.1-1 mL Other Q1H PRN         meclizine (ANTIVERT) tablet 25 mg  25 mg Oral TID PRN         naloxone (NARCAN) injection 0.1-0.4 mg  0.1-0.4 mg Intravenous Q2 Min PRN         ondansetron (ZOFRAN) injection 4 mg  4 mg Intravenous Q6H PRN         oxybutynin (DITROPAN) tablet 5 mg  5 mg Oral Q8H PRN         prochlorperazine (COMPAZINE) injection 5 mg  5 mg Intravenous Q6H PRN   5 mg at 11/03/20 1345     scopolamine (TRANSDERM-SCOP) Patch in Place   Transdermal Q8H         scopolamine (TRANSDERM-SCOP) patch  REMOVAL   Transdermal Once         simvastatin (ZOCOR) tablet 20 mg  20 mg Oral At Bedtime   20 mg at 11/03/20 2203     sodium chloride (PF) 0.9% PF flush 3 mL  3 mL Intracatheter q1 min prn         sodium chloride (PF) 0.9% PF flush 3 mL  3 mL Intracatheter Q8H   3 mL at 11/03/20 1344     Current Outpatient Medications Ordered in Epic   Medication     HYDROcodone-acetaminophen (NORCO) 5-325 MG tablet     sennosides (SENOKOT) 8.6 MG tablet     tamsulosin (FLOMAX) 0.4 MG capsule                  Physical Exam:   Vitals were reviewed  Patient Vitals for the past 8 hrs:   BP Temp Temp src Pulse Resp SpO2   11/04/20 0727 (!) 141/53 98.5  F (36.9  C) Oral 75 16 97 %   11/04/20 0400 138/52 98  F (36.7  C) Oral 72 16 96 %   11/04/20 0100 123/57 98.1  F (36.7  C) Oral 66 16 97 %     GEN: NAD, lying in bed  EYES: EOMI  MOUTH: MMM  NECK: Supple  RESP: Unlabored breathing  CARDIAC: No LE edema  SKIN: Warm  ABD: soft, NT clear  NEURO: AAO           Data:   No results found for: NTBNPI, NTBNP  Lab Results   Component Value Date    WBC 7.8 08/05/2020    WBC 12.6 (H) 08/04/2020    WBC 8.2 07/22/2019    HGB 15.2 11/03/2020    HGB 13.1 08/05/2020    HGB 15.2 08/04/2020    HCT 41.8 08/05/2020    HCT 48.3 (H) 08/04/2020    HCT 45.9 07/22/2019    MCV 93 08/05/2020    MCV 94 08/04/2020    MCV 93 07/22/2019     08/05/2020     08/04/2020     07/22/2019     No results found for: INR

## 2020-11-04 NOTE — PLAN OF CARE
[id number]:  POD0. VSS on RA, refused capno. Continuous PO, O2 in high 90's on RA.  Pt c/o intermittent discomfort in R flank, tylenol and ice effective.  Denies nausea.  Ate small bites of regular diet, tolerated.  Watermelon colored output from PNT and nelson.  BS active, passing flatus.  Ambulated in hallway x1 with SBA. Plan to remove nelson in AM.

## 2020-11-04 NOTE — PLAN OF CARE
POD 1 R perc nephrosotmy- nephrolithotomy/lithotripsy/stent placement. A&Ox4. VSS on RA. Up SBA. C/o some discomfort on neph tube site, declined intervention. LR infusing at 100ml/hr. BS active. Regular diet. R neph tube patent. Bazan removed this AM, due to void. Discharge today pending progress. Continue to monitor.

## 2020-11-04 NOTE — PLAN OF CARE
Patient discharged at 1:50 pm home. IV was discontinued. Bazan removed, neph tube removed. Neph tube site with copious amount of leaking, dressing covered and compressed. Voiding adequately. Tolerating regular diet, passing gas. R stent discomfort but patient declined intervention. Belongings returned to patient.  Discharge instructions and medications reviewed with patient.  Patient verbalized understanding and all questions were answered.  Medications and supplies given to patient. Pt declined flomax. At time of discharge, patient condition was stable and left the unit in a wheel chair escorted by a wheel chair by 88 staff to her ride home.

## 2020-11-04 NOTE — PLAN OF CARE
POD0. VSS on RA, Capno WNL. Pain in R flank/catheter discomfort, tylenol and ice effective. Compazine given x1 for nausea. Pink UOP from neph tube and nelson. Tolerated full liquids, BS active, passing gas. Ambulated briefly for US, plan to ambulate this wero. Plan to remove nelson tomorrow AM.

## 2020-11-04 NOTE — DISCHARGE SUMMARY
[facility] Discharge Summary: Urology    Carola Cox MRN# [id number]   Age: 70 year old YOB: 1950     Date of Admission:  11/3/2020  Date of Discharge::  11/4/2020  1:56 PM  Admitting Physician:  Chip Clark MD  Discharge Physician:  Evelyn Potter PA-C, [place]           Admission Diagnoses:      Nephrolithiasis  Nephrolithiasis  Stone, kidney          Discharge Diagnosis:   Same          Procedures:   Right PCNL, right stent placement          Medications Prior to Admission:     Medications Prior to Admission   Medication Sig Dispense Refill Last Dose     calcium citrate and vitamin D (CITRACAL) 200-250 MG-UNIT TABS per tablet Take 1 tablet by mouth daily   11/2/2020 at AM     famotidine (PEPCID) 20 MG tablet Take 20 mg by mouth nightly as needed    11/2/2020 at 2000     propranolol (INDERAL) 10 MG tablet Take 1 tablet (10 mg) by mouth 3 times daily (Patient taking differently: Take 5 mg by mouth daily as needed (10mg x 0.5 = 5mg)) 90 tablet 11 Past Month at PRN     simvastatin (ZOCOR) 20 MG tablet TAKE 1 TABLET BY MOUTH EVERYDAY AT BEDTIME 90 tablet 0 11/2/2020 at 2000     triamcinolone (NASACORT) 55 MCG/ACT nasal aerosol Spray 1 spray into both nostrils daily   11/2/2020 at 0800     vitamin D3 (CHOLECALCIFEROL) 50 mcg (2000 units) tablet Take 1 tablet by mouth daily   11/2/2020 at 2000     albuterol (VENTOLIN HFA) 108 (90 Base) MCG/ACT inhaler INHALE 2 PUFFS INTO THE LUNGS EVERY 6 HOURS PRN 1 Inhaler 3 More than a month at PRN     meclizine (ANTIVERT) 25 MG tablet Take 1 tablet (25 mg) by mouth 3 times daily as needed for dizziness 30 tablet 1 More than a month at PRN             Discharge Medications:     Current Discharge Medication List      START taking these medications    Details   HYDROcodone-acetaminophen (NORCO) 5-325 MG tablet Take 1 tablet by mouth every 6 hours as needed for moderate to severe pain  Qty: 15 tablet, Refills: 0    Associated Diagnoses:  Nephrolithiasis      sennosides (SENOKOT) 8.6 MG tablet Take 1-3 tablets by mouth daily as needed for constipation  Qty: 20 tablet, Refills: 0    Associated Diagnoses: Nephrolithiasis         CONTINUE these medications which have NOT CHANGED    Details   calcium citrate and vitamin D (CITRACAL) 200-250 MG-UNIT TABS per tablet Take 1 tablet by mouth daily      famotidine (PEPCID) 20 MG tablet Take 20 mg by mouth nightly as needed       propranolol (INDERAL) 10 MG tablet Take 1 tablet (10 mg) by mouth 3 times daily  Qty: 90 tablet, Refills: 11    Associated Diagnoses: Palpitations      simvastatin (ZOCOR) 20 MG tablet TAKE 1 TABLET BY MOUTH EVERYDAY AT BEDTIME  Qty: 90 tablet, Refills: 0    Comments: Medication is being filled for 1 time refill only due to:  Patient needs labs for cholesterol.  Associated Diagnoses: Mixed hyperlipidemia      triamcinolone (NASACORT) 55 MCG/ACT nasal aerosol Spray 1 spray into both nostrils daily      vitamin D3 (CHOLECALCIFEROL) 50 mcg (2000 units) tablet Take 1 tablet by mouth daily      albuterol (VENTOLIN HFA) 108 (90 Base) MCG/ACT inhaler INHALE 2 PUFFS INTO THE LUNGS EVERY 6 HOURS PRN  Qty: 1 Inhaler, Refills: 3    Comments: Pharmacy may dispense brand covered by insurance (Proair, or proventil or ventolin or generic albuterol inhaler)      meclizine (ANTIVERT) 25 MG tablet Take 1 tablet (25 mg) by mouth 3 times daily as needed for dizziness  Qty: 30 tablet, Refills: 1    Associated Diagnoses: Positional vertigo, bilateral                   Consultations:     None          Hospital Course:     The patient underwent the above procedure and tolerated this well. Uncomplicated post operative course. Had adequate pain control, ambulating and tolerating regular diet at the time of discharge.            Discharge Instructions and Follow-Up:   Discharge diet: Regular   Discharge activity: No lifting >10lbs or strenuous exercise for 4 week(s)   Discharge follow-up: Dr. Clark    Wound  care: Ice to area for comfort  Keep wound clean and dry           Discharge Disposition:   Discharged to home        Evelyn Potter PA-C  [facility]

## 2020-11-06 LAB
APPEARANCE STONE: NORMAL
COMPN STONE: NORMAL
NUMBER STONE: NORMAL
SIZE STONE: NORMAL MM
WT STONE: 331 MG

## 2020-11-09 ENCOUNTER — TELEPHONE (OUTPATIENT)
Dept: SURGERY | Facility: CLINIC | Age: 70
End: 2020-11-09

## 2020-11-09 ENCOUNTER — TELEPHONE (OUTPATIENT)
Dept: UROLOGY | Facility: CLINIC | Age: 70
End: 2020-11-09

## 2020-11-09 DIAGNOSIS — N20.0 KIDNEY STONES: Primary | ICD-10-CM

## 2020-11-09 NOTE — TELEPHONE ENCOUNTER
----- Message from Wendy Van sent at 11/9/2020  3:56 PM CST -----  Can you please order? Thanks!  ----- Message -----  From: Chip Clark MD  Sent: 11/9/2020   3:47 PM CST  To: [facility] - Scheduling Pool    See me in 2-4 weeks for KUB same day and stent out in office

## 2020-11-09 NOTE — TELEPHONE ENCOUNTER
Spoke on phone about stone analysis results  For her left-sided stones I think it would be reasonable to perform shockwave lithotripsy along with stent placement  She wishes to do this, but has decided she wishes to wait until the coronavirus pandemic subsides somewhat before undergoing surgery on the left side  We will have her come into the clinic for right-sided stent removal along with repeat KUB and then discuss timing of the left side further from there.

## 2020-11-20 DIAGNOSIS — E78.2 MIXED HYPERLIPIDEMIA: ICD-10-CM

## 2020-11-20 PROCEDURE — 80061 LIPID PANEL: CPT | Performed by: INTERNAL MEDICINE

## 2020-11-20 PROCEDURE — 36415 COLL VENOUS BLD VENIPUNCTURE: CPT | Performed by: INTERNAL MEDICINE

## 2020-11-20 PROCEDURE — 84460 ALANINE AMINO (ALT) (SGPT): CPT | Performed by: INTERNAL MEDICINE

## 2020-11-21 LAB
ALT SERPL W P-5'-P-CCNC: 27 U/L (ref 0–50)
CHOLEST SERPL-MCNC: 201 MG/DL
HDLC SERPL-MCNC: 60 MG/DL
LDLC SERPL CALC-MCNC: 120 MG/DL
NONHDLC SERPL-MCNC: 141 MG/DL
TRIGL SERPL-MCNC: 106 MG/DL

## 2020-11-30 ENCOUNTER — OFFICE VISIT (OUTPATIENT)
Dept: UROLOGY | Facility: CLINIC | Age: 70
End: 2020-11-30
Payer: MEDICARE

## 2020-11-30 ENCOUNTER — HOSPITAL ENCOUNTER (OUTPATIENT)
Dept: GENERAL RADIOLOGY | Facility: CLINIC | Age: 70
Discharge: HOME | End: 2020-11-30
Attending: UROLOGY | Admitting: UROLOGY
Payer: MEDICARE

## 2020-11-30 VITALS
BODY MASS INDEX: 26.12 KG/M2 | HEIGHT: 64 IN | DIASTOLIC BLOOD PRESSURE: 90 MMHG | SYSTOLIC BLOOD PRESSURE: 160 MMHG | WEIGHT: 153 LBS

## 2020-11-30 DIAGNOSIS — Z79.2 PROPHYLACTIC ANTIBIOTIC: ICD-10-CM

## 2020-11-30 DIAGNOSIS — N20.0 KIDNEY STONES: ICD-10-CM

## 2020-11-30 DIAGNOSIS — N20.0 KIDNEY STONES: Primary | ICD-10-CM

## 2020-11-30 PROCEDURE — 74019 RADEX ABDOMEN 2 VIEWS: CPT

## 2020-11-30 PROCEDURE — 52310 CYSTOSCOPY AND TREATMENT: CPT | Mod: 58 | Performed by: UROLOGY

## 2020-11-30 RX ORDER — LIDOCAINE HYDROCHLORIDE 20 MG/ML
JELLY TOPICAL ONCE
Status: COMPLETED | OUTPATIENT
Start: 2020-11-30 | End: 2020-11-30

## 2020-11-30 RX ORDER — METHYLCELLULOSE 2 G/19G
POWDER, FOR SOLUTION ORAL
COMMUNITY
End: 2021-05-04

## 2020-11-30 RX ORDER — CIPROFLOXACIN 500 MG/1
500 TABLET, FILM COATED ORAL ONCE
Qty: 1 TABLET | Refills: 0 | Status: SHIPPED | OUTPATIENT
Start: 2020-11-30 | End: 2020-11-30

## 2020-11-30 RX ADMIN — LIDOCAINE HYDROCHLORIDE: 20 JELLY TOPICAL at 10:12

## 2020-11-30 ASSESSMENT — MIFFLIN-ST. JEOR: SCORE: 1189

## 2020-11-30 ASSESSMENT — PAIN SCALES - GENERAL: PAINLEVEL: NO PAIN (0)

## 2020-11-30 NOTE — PROGRESS NOTES
Office Visit Note  [facility]  [phone]    Nov 30, 2020    [unfilled]    1950    UROLOGIC DIAGNOSES:    Bilateral kidney stones    CURRENT INTERVENTIONS:    S/P right PCNL    History:    Corina returns to clinic today for postoperative stent removal and discussion of her remaining stones.  She has felt well since her procedure.  Stones were composed of calcium oxalate mixed with calcium phosphate.      Imaging: I reviewed her KUB images from today.  Stent in good position.  There are still some small stone fragments remaining on the right side.    Labs:      MEDICATIONS:    Current Outpatient Medications:      albuterol (VENTOLIN HFA) 108 (90 Base) MCG/ACT inhaler, INHALE 2 PUFFS INTO THE LUNGS EVERY 6 HOURS PRN, Disp: 1 Inhaler, Rfl: 3     ciprofloxacin (CIPRO) 500 MG tablet, Take 1 tablet (500 mg) by mouth once for 1 dose, Disp: 1 tablet, Rfl: 0     famotidine (PEPCID) 20 MG tablet, Take 20 mg by mouth nightly as needed , Disp: , Rfl:      meclizine (ANTIVERT) 25 MG tablet, Take 1 tablet (25 mg) by mouth 3 times daily as needed for dizziness, Disp: 30 tablet, Rfl: 1     methylcellulose (CITRUCEL) powder, , Disp: , Rfl:      simvastatin (ZOCOR) 20 MG tablet, TAKE 1 TABLET BY MOUTH EVERYDAY AT BEDTIME, Disp: 90 tablet, Rfl: 0     triamcinolone (NASACORT) 55 MCG/ACT nasal aerosol, Spray 1 spray into both nostrils daily, Disp: , Rfl:      vitamin D3 (CHOLECALCIFEROL) 50 mcg (2000 units) tablet, Take 1 tablet by mouth daily, Disp: , Rfl:      calcium citrate and vitamin D (CITRACAL) 200-250 MG-UNIT TABS per tablet, Take 1 tablet by mouth daily, Disp: , Rfl:      HYDROcodone-acetaminophen (NORCO) 5-325 MG tablet, Take 1 tablet by mouth every 6 hours as needed for moderate to severe pain (Patient not taking: Reported on 11/30/2020), Disp: 15 tablet, Rfl: 0     propranolol (INDERAL) 10 MG tablet, Take 1 tablet (10 mg) by mouth 3 times daily (Patient not taking: Reported on 11/30/2020), Disp: 90  "tablet, Rfl: 11     sennosides (SENOKOT) 8.6 MG tablet, Take 1-3 tablets by mouth daily as needed for constipation (Patient not taking: Reported on 11/30/2020), Disp: 20 tablet, Rfl: 0  No current facility-administered medications for this visit.     ALLERGIES:     Allergies   Allergen Reactions     Cefprozil Hives     Cefzil     Penicillin V Rash     As a young adult         REVIEW OF SYSTEMS: Ten point review of systems without change as outlined in HPI    SURGICAL HISTORY:    Past Surgical History:   Procedure Laterality Date     COMBINED CYSTOSCOPY, INSERT STENT URETER(S) Right 11/3/2020    Procedure: Flexible cystoscopy with right sided ureteral catheter and stent placement;  Surgeon: Chip Clark MD;  Location:  OR     CYSTOSCOPY       HYSTERECTOMY TOTAL ABDOMINAL, BILATERAL SALPINGO-OOPHORECTOMY, COMBINED      Endometriosis.     IR RENAL STONE REMOVAL RIGHT  11/3/2020     LASER HOLMIUM NEPHROLITHOTOMY VIA PERCUTANEOUS NEPHROSTOMY Right 11/3/2020    Procedure: Laser holmium nephrolithotomy via percutaneous nephrostomy, stent insertion;  Surgeon: Chip Clark MD;  Location:  OR     LITHOTRIPSY       PERCUTANEOUS NEPHROLITHOTOMY Right 11/3/2020    Procedure: Right percutaneous nephrolithotomy, Holmium laser lithotripsy, stent placement;  Surgeon: Chip Clark MD;  Location:  OR         PHYSICAL EXAM:    BP (!) 160/90   Ht 1.626 m (5' 4\")   Wt 69.4 kg (153 lb)   BMI 26.26 kg/m      Constitutional: Well developed. Conversant and in no acute distress  Eyes: Anicteric sclera, conjunctiva clear, normal extraocular movements  ENT: Normocephalic and atraumatic,   Skin: Warm and dry. No rashes or lesions  Cardiac: No peripheral edema  Back/Flank: Not done  CNS/PNS: Normal musculature and movements, moves all extremities normally  Respiratory: Normal non-labored breathing  Abdomen: Soft nontender and nondistended  Peripheral Vascular: No peripheral edema  Mental Status/Psych: " Alert and Oriented x 3. Normal mood and affect      External Genitalia: Not done  Bladder: Not done  Urethra: Not done   Vagina: Not done    Cystoscopy: I performed flexible cystoscopy and the stent was removed without difficulty      Urinalysis:  UA RESULTS:  Recent Labs   Lab Test 08/04/20 2047 01/06/14  1423   COLOR Light Yellow Yellow   APPEARANCE Clear Clear   URINEGLC Negative Negative   URINEBILI Negative Negative   URINEKETONE Negative Negative   SG 1.014 1.005   UBLD Moderate* Negative   URINEPH 5.5 6.5   PROTEIN 20* Negative   UROBILINOGEN  --  0.2   NITRITE Negative Negative   LEUKEST Moderate* Negative   RBCU 35*  --    WBCU 15*  --          IMPRESSION:    Bilateral kidney stones    PLAN:    We discussed prevention methods for future stones.  We discussed her KUB results from today.  There are a few fragments remaining in the right kidney and she is advised of this.  We still have not treated her stones on the left side as well.  We discussed possible treatment options including percutaneous removal on the left side, shockwave lithotripsy with a stent in place, or ureteroscopy.  Given her stone composition I think that any of the above options would be reasonable and we discussed the pros and cons of each option.  She would like to proceed with a shockwave lithotripsy on the left side along with left-sided stent placement.  I discussed with her that we will get a KUB before the procedure and if necessary we can treat any remaining fragments on the right side at the same time as well.    Due to the current coronavirus pandemic the patient does not have anyone who can stay overnight with her after an outpatient procedure.  Therefore, she would like to delay the procedure until she is hopefully vaccinated and it is safe to have someone stay with her at home at night.  I will check in with her again in the spring with a KUB.    Total Time:  20 min  Time in Consultation: 15 min      Chip Clark  M.D.

## 2020-11-30 NOTE — LETTER
11/30/2020       RE: Carola Cox  [address] [address] [address]     Dear Colleague,    Thank you for referring your patient, Carola Cox, to the [facility] [place] at [facility]. Please see a copy of my visit note below.    Office Visit Note  [facility]  [phone]    Nov 30, 2020    [unfilled]    1950    UROLOGIC DIAGNOSES:    Bilateral kidney stones    CURRENT INTERVENTIONS:    S/P right PCNL    History:    Corina returns to clinic today for postoperative stent removal and discussion of her remaining stones.  She has felt well since her procedure.  Stones were composed of calcium oxalate mixed with calcium phosphate.      Imaging: I reviewed her KUB images from today.  Stent in good position.  There are still some small stone fragments remaining on the right side.    Labs:      MEDICATIONS:    Current Outpatient Medications:      albuterol (VENTOLIN HFA) 108 (90 Base) MCG/ACT inhaler, INHALE 2 PUFFS INTO THE LUNGS EVERY 6 HOURS PRN, Disp: 1 Inhaler, Rfl: 3     ciprofloxacin (CIPRO) 500 MG tablet, Take 1 tablet (500 mg) by mouth once for 1 dose, Disp: 1 tablet, Rfl: 0     famotidine (PEPCID) 20 MG tablet, Take 20 mg by mouth nightly as needed , Disp: , Rfl:      meclizine (ANTIVERT) 25 MG tablet, Take 1 tablet (25 mg) by mouth 3 times daily as needed for dizziness, Disp: 30 tablet, Rfl: 1     methylcellulose (CITRUCEL) powder, , Disp: , Rfl:      simvastatin (ZOCOR) 20 MG tablet, TAKE 1 TABLET BY MOUTH EVERYDAY AT BEDTIME, Disp: 90 tablet, Rfl: 0     triamcinolone (NASACORT) 55 MCG/ACT nasal aerosol, Spray 1 spray into both nostrils daily, Disp: , Rfl:      vitamin D3 (CHOLECALCIFEROL) 50 mcg (2000 units) tablet, Take 1 tablet by mouth daily, Disp: , Rfl:      calcium citrate and vitamin D (CITRACAL) 200-250 MG-UNIT TABS per tablet, Take 1 tablet by mouth daily, Disp: , Rfl:       "HYDROcodone-acetaminophen (NORCO) 5-325 MG tablet, Take 1 tablet by mouth every 6 hours as needed for moderate to severe pain (Patient not taking: Reported on 11/30/2020), Disp: 15 tablet, Rfl: 0     propranolol (INDERAL) 10 MG tablet, Take 1 tablet (10 mg) by mouth 3 times daily (Patient not taking: Reported on 11/30/2020), Disp: 90 tablet, Rfl: 11     sennosides (SENOKOT) 8.6 MG tablet, Take 1-3 tablets by mouth daily as needed for constipation (Patient not taking: Reported on 11/30/2020), Disp: 20 tablet, Rfl: 0  No current facility-administered medications for this visit.     ALLERGIES:     Allergies   Allergen Reactions     Cefprozil Hives     Cefzil     Penicillin V Rash     As a young adult         REVIEW OF SYSTEMS: Ten point review of systems without change as outlined in HPI    SURGICAL HISTORY:    Past Surgical History:   Procedure Laterality Date     COMBINED CYSTOSCOPY, INSERT STENT URETER(S) Right 11/3/2020    Procedure: Flexible cystoscopy with right sided ureteral catheter and stent placement;  Surgeon: Chip Clark MD;  Location:  OR     CYSTOSCOPY       HYSTERECTOMY TOTAL ABDOMINAL, BILATERAL SALPINGO-OOPHORECTOMY, COMBINED      Endometriosis.     IR RENAL STONE REMOVAL RIGHT  11/3/2020     LASER HOLMIUM NEPHROLITHOTOMY VIA PERCUTANEOUS NEPHROSTOMY Right 11/3/2020    Procedure: Laser holmium nephrolithotomy via percutaneous nephrostomy, stent insertion;  Surgeon: Chip Clark MD;  Location:  OR     LITHOTRIPSY       PERCUTANEOUS NEPHROLITHOTOMY Right 11/3/2020    Procedure: Right percutaneous nephrolithotomy, Holmium laser lithotripsy, stent placement;  Surgeon: Chip Clark MD;  Location:  OR         PHYSICAL EXAM:    BP (!) 160/90   Ht 1.626 m (5' 4\")   Wt 69.4 kg (153 lb)   BMI 26.26 kg/m      Constitutional: Well developed. Conversant and in no acute distress  Eyes: Anicteric sclera, conjunctiva clear, normal extraocular movements  ENT: Normocephalic " and atraumatic,   Skin: Warm and dry. No rashes or lesions  Cardiac: No peripheral edema  Back/Flank: Not done  CNS/PNS: Normal musculature and movements, moves all extremities normally  Respiratory: Normal non-labored breathing  Abdomen: Soft nontender and nondistended  Peripheral Vascular: No peripheral edema  Mental Status/Psych: Alert and Oriented x 3. Normal mood and affect      External Genitalia: Not done  Bladder: Not done  Urethra: Not done   Vagina: Not done    Cystoscopy: I performed flexible cystoscopy and the stent was removed without difficulty      Urinalysis:  UA RESULTS:  Recent Labs   Lab Test 08/04/20 2047 01/06/14  1423   COLOR Light Yellow Yellow   APPEARANCE Clear Clear   URINEGLC Negative Negative   URINEBILI Negative Negative   URINEKETONE Negative Negative   SG 1.014 1.005   UBLD Moderate* Negative   URINEPH 5.5 6.5   PROTEIN 20* Negative   UROBILINOGEN  --  0.2   NITRITE Negative Negative   LEUKEST Moderate* Negative   RBCU 35*  --    WBCU 15*  --          IMPRESSION:    Bilateral kidney stones    PLAN:    We discussed prevention methods for future stones.  We discussed her KUB results from today.  There are a few fragments remaining in the right kidney and she is advised of this.  We still have not treated her stones on the left side as well.  We discussed possible treatment options including percutaneous removal on the left side, shockwave lithotripsy with a stent in place, or ureteroscopy.  Given her stone composition I think that any of the above options would be reasonable and we discussed the pros and cons of each option.  She would like to proceed with a shockwave lithotripsy on the left side along with left-sided stent placement.  I discussed with her that we will get a KUB before the procedure and if necessary we can treat any remaining fragments on the right side at the same time as well.    Due to the current coronavirus pandemic the patient does not have anyone who can stay  overnight with her after an outpatient procedure.  Therefore, she would like to delay the procedure until she is hopefully vaccinated and it is safe to have someone stay with her at home at night.  I will check in with her again in the spring with a KUB.    Total Time:  20 min  Time in Consultation: 15 min      Chip Clark M.D.

## 2020-11-30 NOTE — PATIENT INSTRUCTIONS
"AFTER YOUR CYSTOSCOPY AND STENT REMOVAL  ?  ?  You have just completed a cystoscopy, or \"cysto\", which allowed your physician to learn more about your bladder (or to remove a stent placed after surgery). We suggest that you continue to avoid caffeine, fruit juice, and alcohol for the next 24 hours, however, you are encouraged to return to your normal activities.  ?  ?  A few things that are considered normal after your cystoscopy:  ?  * small amount of bleeding (or spotting) that clears within the next 24 hours  ?  * slight burning sensation with urination  ?  * sensation of needing to void (urinate) more frequently  ?  * the feeling of \"air\" in your urine  ?  * mild discomfort that is relieved with Tylenol    * bladder spasms  ?  ?  ?  Please contact our office promptly if you:  ?  * develop a fever above 101 degrees  ?  * are unable to urinate  ?  * develop bright red blood that does not stop  ?  * experience severe pain or swelling  ?  ?  ?  And of course, please contact our office with any concerns or questions [phone]  ?      "

## 2020-11-30 NOTE — NURSING NOTE
Chief Complaint   Patient presents with     Kidney Stone Related     Stent removal     Prior to the start of the procedure and with procedural staff participation, I verbally confirmed the patient s identity using two indicators, relevant allergies, that the procedure was appropriate and matched the consent or emergent situation, and that the correct equipment/implants were available. Immediately prior to starting the procedure I conducted the Time Out with the procedural staff and re-confirmed the patient s name, procedure, and site/side. I have wiped the patient off with the povidone-Iodine solution, draped them, and used Lidocaine hydrochloride jelly. (The Joint Commission universal protocol was followed.)  Yes    Sedation (Moderate or Deep): None    5mL 2% lidocaine hydrochloride Urojet instilled into urethra.    NDC# [address]-6  Lot #: [id number]  Expiration Date:  06/22    Maria Guadalupe Leon, EMT

## 2020-12-01 DIAGNOSIS — N20.0 KIDNEY STONES: Primary | ICD-10-CM

## 2020-12-18 ENCOUNTER — TELEPHONE (OUTPATIENT)
Dept: UROLOGY | Facility: CLINIC | Age: 70
End: 2020-12-18

## 2020-12-18 NOTE — TELEPHONE ENCOUNTER
Returned call to Corina. Stone analysis was just done in November. She doesn't need to bring stone in again. She expressed understanding.  DOYLE Chan RN       Health Call Center    Phone Message    May a detailed message be left on voicemail: yes     Reason for Call: Symptoms or Concerns     If patient has red-flag symptoms, warm transfer to triage line    Current symptom or concern: Pt thinks she passed a stone    Symptoms have been present for:  Just this morning    Has patient previously been seen for this? Yes    By : Dr. Clark    Pt thinks she passed a stone this morning but had no discomfort at all. She is wondering if she should bring the stone in or what she should do with it. Please call back to discuss. Thank you      Action Taken: Message routed to:  Other:  clinical pool    Travel Screening: Not Applicable

## 2020-12-30 ENCOUNTER — MYC MEDICAL ADVICE (OUTPATIENT)
Dept: INTERNAL MEDICINE | Facility: CLINIC | Age: 70
End: 2020-12-30

## 2020-12-30 NOTE — TELEPHONE ENCOUNTER
See her [employer] message. Should she schedule VV or OV? There are limited openings tomorrow.   Or could do Evisit?

## 2021-01-08 DIAGNOSIS — N20.0 KIDNEY STONE: Primary | ICD-10-CM

## 2021-01-15 ENCOUNTER — HEALTH MAINTENANCE LETTER (OUTPATIENT)
Age: 71
End: 2021-01-15

## 2021-01-22 DIAGNOSIS — E78.2 MIXED HYPERLIPIDEMIA: ICD-10-CM

## 2021-01-22 RX ORDER — SIMVASTATIN 20 MG
TABLET ORAL
Qty: 90 TABLET | Refills: 2 | Status: SHIPPED | OUTPATIENT
Start: 2021-01-22 | End: 2021-11-05

## 2021-01-22 NOTE — TELEPHONE ENCOUNTER
Pending Prescriptions:                       Disp   Refills    simvastatin (ZOCOR) 20 MG tablet [Pharmac*90 tab*2            Sig: TAKE 1 TABLET BY MOUTH EVERYDAY AT BEDTIME -           NEEDS LABS FOR REFILLS    Prescription approved per [facility] Refill Protocol.

## 2021-02-24 ENCOUNTER — MYC MEDICAL ADVICE (OUTPATIENT)
Dept: INTERNAL MEDICINE | Facility: CLINIC | Age: 71
End: 2021-02-24

## 2021-03-01 ENCOUNTER — IMMUNIZATION (OUTPATIENT)
Dept: NURSING | Facility: CLINIC | Age: 71
End: 2021-03-01
Payer: MEDICARE

## 2021-03-01 PROCEDURE — 91301 PR COVID VAC MODERNA 100 MCG/0.5 ML IM: CPT

## 2021-03-01 PROCEDURE — 0011A PR COVID VAC MODERNA 100 MCG/0.5 ML IM: CPT

## 2021-03-24 ENCOUNTER — FCC EXTENDED DOCUMENTATION (OUTPATIENT)
Dept: PSYCHOLOGY | Facility: CLINIC | Age: 71
End: 2021-03-24

## 2021-03-24 NOTE — PROGRESS NOTES
Discharge Summary  Multiple Sessions    Client Name: Carola Cox MRN#: [id number] YOB: 1950      Intake / Discharge Date: 2/14/2020 - 4/24/2020      DSM5 Diagnoses: (Sustained by DSM5 Criteria Listed Above)  Diagnoses: Panic Disorder   Psychosocial & Contextual Factors: panic attacks impacting functioning  WHODAS 2.0 (12 item) Score: 12 on 2/14/2020          Presenting Concern:  Anxiety    Reason for Discharge:  Client is satisfied with progress and Client did not return      Disposition at Time of Last Encounter:   Comments:   Client can return to [facility] at anytime     Risk Management:   Client denies a history of suicidal ideation, suicide attempts, self-injurious behavior, homicidal ideation, homicidal behavior and and other safety concerns  Recommended that patient call 911 or go to the local ED should there be a change in any of these risk factors.      Referred To:  None        Sarah Sanders, [facility]   3/24/2021

## 2021-03-29 ENCOUNTER — IMMUNIZATION (OUTPATIENT)
Dept: NURSING | Facility: CLINIC | Age: 71
End: 2021-03-29
Attending: INTERNAL MEDICINE
Payer: MEDICARE

## 2021-03-29 ENCOUNTER — VIRTUAL VISIT (OUTPATIENT)
Dept: UROLOGY | Facility: CLINIC | Age: 71
End: 2021-03-29
Payer: MEDICARE

## 2021-03-29 ENCOUNTER — HOSPITAL ENCOUNTER (OUTPATIENT)
Dept: GENERAL RADIOLOGY | Facility: CLINIC | Age: 71
Discharge: HOME | End: 2021-03-29
Attending: UROLOGY | Admitting: UROLOGY
Payer: MEDICARE

## 2021-03-29 VITALS — WEIGHT: 148 LBS | HEIGHT: 64 IN | BODY MASS INDEX: 25.27 KG/M2

## 2021-03-29 DIAGNOSIS — N20.0 KIDNEY STONES: ICD-10-CM

## 2021-03-29 DIAGNOSIS — N20.0 KIDNEY STONE: Primary | ICD-10-CM

## 2021-03-29 PROCEDURE — 0012A PR COVID VAC MODERNA 100 MCG/0.5 ML IM: CPT

## 2021-03-29 PROCEDURE — 74019 RADEX ABDOMEN 2 VIEWS: CPT

## 2021-03-29 PROCEDURE — 91301 PR COVID VAC MODERNA 100 MCG/0.5 ML IM: CPT

## 2021-03-29 PROCEDURE — 99214 OFFICE O/P EST MOD 30 MIN: CPT | Mod: 95 | Performed by: UROLOGY

## 2021-03-29 RX ORDER — CEFAZOLIN SODIUM 2 G/50ML
2 SOLUTION INTRAVENOUS
Status: CANCELLED | OUTPATIENT
Start: 2021-03-29

## 2021-03-29 RX ORDER — CEFAZOLIN SODIUM 2 G/50ML
2 SOLUTION INTRAVENOUS SEE ADMIN INSTRUCTIONS
Status: CANCELLED | OUTPATIENT
Start: 2021-03-29

## 2021-03-29 ASSESSMENT — PAIN SCALES - GENERAL: PAINLEVEL: NO PAIN (0)

## 2021-03-29 ASSESSMENT — MIFFLIN-ST. JEOR: SCORE: 1161.32

## 2021-03-29 NOTE — PROGRESS NOTES
Corina is a 71 year old who is being evaluated via a billable video visit.      How would you like to obtain your AVS? MyChart  If the video visit is dropped, the invitation should be resent by: Text to cell phone: [phone]  Will anyone else be joining your video visit? No         Office Visit Note  [facility]  [phone]    Mar 29, 2021    [unfilled]    1950    UROLOGIC DIAGNOSES:    Bilateral kidney stones    CURRENT INTERVENTIONS:    S/P right PCNL    History:    Corina is set up for virtual visit today for kidney stone follow-up.  She has had no pain or symptoms in the past few months.  She says that she did pass a couple of small fragments earlier this winter as well.      Imaging: I reviewed her KUB images from today.  2 stones in the lower pole of the left kidney, unchanged.  The right-sided kidney stones are difficult to visualize today because of bowel gas present.     Labs:      MEDICATIONS:    Current Outpatient Medications:      methylcellulose (CITRUCEL) powder, , Disp: , Rfl:      simvastatin (ZOCOR) 20 MG tablet, TAKE 1 TABLET BY MOUTH EVERYDAY AT BEDTIME - NEEDS LABS FOR REFILLS, Disp: 90 tablet, Rfl: 2     albuterol (VENTOLIN HFA) 108 (90 Base) MCG/ACT inhaler, INHALE 2 PUFFS INTO THE LUNGS EVERY 6 HOURS PRN, Disp: 1 Inhaler, Rfl: 3     calcium citrate and vitamin D (CITRACAL) 200-250 MG-UNIT TABS per tablet, Take 1 tablet by mouth daily, Disp: , Rfl:      famotidine (PEPCID) 20 MG tablet, Take 20 mg by mouth nightly as needed , Disp: , Rfl:      HYDROcodone-acetaminophen (NORCO) 5-325 MG tablet, Take 1 tablet by mouth every 6 hours as needed for moderate to severe pain (Patient not taking: Reported on 11/30/2020), Disp: 15 tablet, Rfl: 0     meclizine (ANTIVERT) 25 MG tablet, Take 1 tablet (25 mg) by mouth 3 times daily as needed for dizziness (Patient not taking: Reported on 3/29/2021), Disp: 30 tablet, Rfl: 1     propranolol (INDERAL) 10 MG tablet, Take 1 tablet (10 mg) by  mouth 3 times daily (Patient not taking: Reported on 11/30/2020), Disp: 90 tablet, Rfl: 11     sennosides (SENOKOT) 8.6 MG tablet, Take 1-3 tablets by mouth daily as needed for constipation (Patient not taking: Reported on 11/30/2020), Disp: 20 tablet, Rfl: 0     triamcinolone (NASACORT) 55 MCG/ACT nasal aerosol, Spray 1 spray into both nostrils daily, Disp: , Rfl:      vitamin D3 (CHOLECALCIFEROL) 50 mcg (2000 units) tablet, Take 1 tablet by mouth daily, Disp: , Rfl:     ALLERGIES:     Allergies   Allergen Reactions     Cefprozil Hives     Cefzil     Penicillin V Rash     As a young adult         REVIEW OF SYSTEMS:   Skin: No rash, pruritis, or skin pigmentation  Eyes: No changes in vision  Ears/Nose/Throat: No changes in hearing, no nosebleeds  Respiratory: No shortness of breath, dyspnea on exertion, cough, or hemoptysis  Cardiovascular: No chest pain or palpitations  Gastrointestinal: No diarrhea or constipation. No abdominal pain. No hematochezia  Genitourinary: see HPI  Musculoskeletal: No pain or swelling of joints, normal range of motion  Neurologic: No weakness or tremors  Psychiatric: No recent changes in memory or mood  Hematologic/Lymphatic/Immunologic: No easy bruising or enlarged lymph nodes  Endocrine: No weight gain or loss    SURGICAL HISTORY:    Past Surgical History:   Procedure Laterality Date     COMBINED CYSTOSCOPY, INSERT STENT URETER(S) Right 11/3/2020    Procedure: Flexible cystoscopy with right sided ureteral catheter and stent placement;  Surgeon: Chip Clark MD;  Location:  OR     CYSTOSCOPY       HYSTERECTOMY TOTAL ABDOMINAL, BILATERAL SALPINGO-OOPHORECTOMY, COMBINED      Endometriosis.     IR RENAL STONE REMOVAL RIGHT  11/3/2020     LASER HOLMIUM NEPHROLITHOTOMY VIA PERCUTANEOUS NEPHROSTOMY Right 11/3/2020    Procedure: Laser holmium nephrolithotomy via percutaneous nephrostomy, stent insertion;  Surgeon: Chip Clark MD;  Location:  OR     LITHOTRIPSY        PERCUTANEOUS NEPHROLITHOTOMY Right 11/3/2020    Procedure: Right percutaneous nephrolithotomy, Holmium laser lithotripsy, stent placement;  Surgeon: Chip Clark MD;  Location:  OR         PHYSICAL EXAM:    General: Alert and oriented to time, place, and self. In NAD   HEENT: Head AT/NC, EOMI, CN Grossly intact   Lungs: no respiratory distress, or pursed lip breathing   Heart: No obvious jugular venous distension present   Musculoskeltal: Normal movements. Normal appearing musculature  Skin: no suspicious lesions or rashes   Neuro: Alert, oriented, speech and mentation normal; moving all 4 extremities equally.   Psych: affect and mood normal        Urinalysis:  UA RESULTS:  Recent Labs   Lab Test 08/04/20 2047 01/06/14  1423   COLOR Light Yellow Yellow   APPEARANCE Clear Clear   URINEGLC Negative Negative   URINEBILI Negative Negative   URINEKETONE Negative Negative   SG 1.014 1.005   UBLD Moderate* Negative   URINEPH 5.5 6.5   PROTEIN 20* Negative   UROBILINOGEN  --  0.2   NITRITE Negative Negative   LEUKEST Moderate* Negative   RBCU 35*  --    WBCU 15*  --          IMPRESSION:    Bilateral kidney stones    PLAN:    She had calcium oxalate and calcium phosphate stones removed from the right kidney previously.  On her KUB today she still has the stones on the left that are unchanged.  She had a few fragments previously on the right side that are not visible on KUB today.  We discussed treatment options for her stones.  She wishes to undergo shockwave lithotripsy.    I recommended that we proceed with left-sided shockwave lithotripsy and also place a stent and that said due to the size of the stone.  This procedure was discussed.  Under the same anesthetic I recommended that we look at the right side with the fluoroscopy to see if we see any stones and then we can treat any right-sided stones with shockwave lithotripsy under the same anesthetic.  We discussed the risks of the procedure and she wishes to  proceed.    We will get her scheduled as an outpatient in the operating room.  She will have a KUB and stent removal 2 weeks after surgery.      Chip Clark M.D.          Video Start Time: 10:45 AM  Video-Visit Details    Type of service:  Video Visit    Video End Time:11:02 AM    Originating Location (pt. Location): Home    Distant Location (provider location):  [facility] [place]     Platform used for Video Visit: [employer] the [employer]

## 2021-03-29 NOTE — LETTER
3/29/2021       RE: Carola Cox  [id number] [facility] [address]     Dear Colleague,    Thank you for referring your patient, Carola Cox, to the [facility] [place] at [facility]. Please see a copy of my visit note below.    Corina is a 71 year old who is being evaluated via a billable video visit.      How would you like to obtain your AVS? MyChart  If the video visit is dropped, the invitation should be resent by: Text to cell phone: [phone]  Will anyone else be joining your video visit? No         Office Visit Note  [facility]  [phone]    Mar 29, 2021    [unfilled]    1950    UROLOGIC DIAGNOSES:    Bilateral kidney stones    CURRENT INTERVENTIONS:    S/P right PCNL    History:    Corina is set up for virtual visit today for kidney stone follow-up.  She has had no pain or symptoms in the past few months.  She says that she did pass a couple of small fragments earlier this winter as well.      Imaging: I reviewed her KUB images from today.  2 stones in the lower pole of the left kidney, unchanged.  The right-sided kidney stones are difficult to visualize today because of bowel gas present.     Labs:      MEDICATIONS:    Current Outpatient Medications:      methylcellulose (CITRUCEL) powder, , Disp: , Rfl:      simvastatin (ZOCOR) 20 MG tablet, TAKE 1 TABLET BY MOUTH EVERYDAY AT BEDTIME - NEEDS LABS FOR REFILLS, Disp: 90 tablet, Rfl: 2     albuterol (VENTOLIN HFA) 108 (90 Base) MCG/ACT inhaler, INHALE 2 PUFFS INTO THE LUNGS EVERY 6 HOURS PRN, Disp: 1 Inhaler, Rfl: 3     calcium citrate and vitamin D (CITRACAL) 200-250 MG-UNIT TABS per tablet, Take 1 tablet by mouth daily, Disp: , Rfl:      famotidine (PEPCID) 20 MG tablet, Take 20 mg by mouth nightly as needed , Disp: , Rfl:      HYDROcodone-acetaminophen (NORCO) 5-325 MG tablet, Take 1 tablet by mouth every 6 hours as needed for moderate  to severe pain (Patient not taking: Reported on 11/30/2020), Disp: 15 tablet, Rfl: 0     meclizine (ANTIVERT) 25 MG tablet, Take 1 tablet (25 mg) by mouth 3 times daily as needed for dizziness (Patient not taking: Reported on 3/29/2021), Disp: 30 tablet, Rfl: 1     propranolol (INDERAL) 10 MG tablet, Take 1 tablet (10 mg) by mouth 3 times daily (Patient not taking: Reported on 11/30/2020), Disp: 90 tablet, Rfl: 11     sennosides (SENOKOT) 8.6 MG tablet, Take 1-3 tablets by mouth daily as needed for constipation (Patient not taking: Reported on 11/30/2020), Disp: 20 tablet, Rfl: 0     triamcinolone (NASACORT) 55 MCG/ACT nasal aerosol, Spray 1 spray into both nostrils daily, Disp: , Rfl:      vitamin D3 (CHOLECALCIFEROL) 50 mcg (2000 units) tablet, Take 1 tablet by mouth daily, Disp: , Rfl:     ALLERGIES:     Allergies   Allergen Reactions     Cefprozil Hives     Cefzil     Penicillin V Rash     As a young adult         REVIEW OF SYSTEMS:   Skin: No rash, pruritis, or skin pigmentation  Eyes: No changes in vision  Ears/Nose/Throat: No changes in hearing, no nosebleeds  Respiratory: No shortness of breath, dyspnea on exertion, cough, or hemoptysis  Cardiovascular: No chest pain or palpitations  Gastrointestinal: No diarrhea or constipation. No abdominal pain. No hematochezia  Genitourinary: see HPI  Musculoskeletal: No pain or swelling of joints, normal range of motion  Neurologic: No weakness or tremors  Psychiatric: No recent changes in memory or mood  Hematologic/Lymphatic/Immunologic: No easy bruising or enlarged lymph nodes  Endocrine: No weight gain or loss    SURGICAL HISTORY:    Past Surgical History:   Procedure Laterality Date     COMBINED CYSTOSCOPY, INSERT STENT URETER(S) Right 11/3/2020    Procedure: Flexible cystoscopy with right sided ureteral catheter and stent placement;  Surgeon: Chip Clark MD;  Location: [facility] OR     CYSTOSCOPY       HYSTERECTOMY TOTAL ABDOMINAL, BILATERAL  SALPINGO-OOPHORECTOMY, COMBINED      Endometriosis.     IR RENAL STONE REMOVAL RIGHT  11/3/2020     LASER HOLMIUM NEPHROLITHOTOMY VIA PERCUTANEOUS NEPHROSTOMY Right 11/3/2020    Procedure: Laser holmium nephrolithotomy via percutaneous nephrostomy, stent insertion;  Surgeon: Chip Clark MD;  Location:  OR     LITHOTRIPSY       PERCUTANEOUS NEPHROLITHOTOMY Right 11/3/2020    Procedure: Right percutaneous nephrolithotomy, Holmium laser lithotripsy, stent placement;  Surgeon: Chip Clark MD;  Location:  OR         PHYSICAL EXAM:    General: Alert and oriented to time, place, and self. In NAD   HEENT: Head AT/NC, EOMI, CN Grossly intact   Lungs: no respiratory distress, or pursed lip breathing   Heart: No obvious jugular venous distension present   Musculoskeltal: Normal movements. Normal appearing musculature  Skin: no suspicious lesions or rashes   Neuro: Alert, oriented, speech and mentation normal; moving all 4 extremities equally.   Psych: affect and mood normal        Urinalysis:  UA RESULTS:  Recent Labs   Lab Test 08/04/20 2047 01/06/14  1423   COLOR Light Yellow Yellow   APPEARANCE Clear Clear   URINEGLC Negative Negative   URINEBILI Negative Negative   URINEKETONE Negative Negative   SG 1.014 1.005   UBLD Moderate* Negative   URINEPH 5.5 6.5   PROTEIN 20* Negative   UROBILINOGEN  --  0.2   NITRITE Negative Negative   LEUKEST Moderate* Negative   RBCU 35*  --    WBCU 15*  --          IMPRESSION:    Bilateral kidney stones    PLAN:    She had calcium oxalate and calcium phosphate stones removed from the right kidney previously.  On her KUB today she still has the stones on the left that are unchanged.  She had a few fragments previously on the right side that are not visible on KUB today.  We discussed treatment options for her stones.  She wishes to undergo shockwave lithotripsy.    I recommended that we proceed with left-sided shockwave lithotripsy and also place a stent and that  said due to the size of the stone.  This procedure was discussed.  Under the same anesthetic I recommended that we look at the right side with the fluoroscopy to see if we see any stones and then we can treat any right-sided stones with shockwave lithotripsy under the same anesthetic.  We discussed the risks of the procedure and she wishes to proceed.    We will get her scheduled as an outpatient in the operating room.  She will have a KUB and stent removal 2 weeks after surgery.      Chip Clark M.D.          Video Start Time: 10:45 AM  Video-Visit Details    Type of service:  Video Visit    Video End Time:11:02 AM    Originating Location (pt. Location): Home    Distant Location (provider location):  [facility] [place]     Platform used for Video Visit: [employer]

## 2021-03-29 NOTE — NURSING NOTE
"Chief Complaint   Patient presents with     Kidney Stone Related     Patient will have a video with Dr Clark to discuss her KUB       Height 1.626 m (5' 4\"), weight 67.1 kg (148 lb), not currently breastfeeding. Body mass index is 25.4 kg/m .    Patient Active Problem List   Diagnosis     CARDIOVASCULAR SCREENING; LDL GOAL LESS THAN 160     Osteoporosis     Generalized anxiety disorder     BPPV (benign paroxysmal positional vertigo)     Hyperlipidemia     Cardiac dysrhythmia     Atrophic vaginitis     H/O wheezing     Kidney stones     Nephrolithiasis     Stone, kidney       Allergies   Allergen Reactions     Cefprozil Hives     Cefzil     Penicillin V Rash     As a young adult         Current Outpatient Medications   Medication Sig Dispense Refill     methylcellulose (CITRUCEL) powder        simvastatin (ZOCOR) 20 MG tablet TAKE 1 TABLET BY MOUTH EVERYDAY AT BEDTIME - NEEDS LABS FOR REFILLS 90 tablet 2     albuterol (VENTOLIN HFA) 108 (90 Base) MCG/ACT inhaler INHALE 2 PUFFS INTO THE LUNGS EVERY 6 HOURS PRN 1 Inhaler 3     calcium citrate and vitamin D (CITRACAL) 200-250 MG-UNIT TABS per tablet Take 1 tablet by mouth daily       famotidine (PEPCID) 20 MG tablet Take 20 mg by mouth nightly as needed        HYDROcodone-acetaminophen (NORCO) 5-325 MG tablet Take 1 tablet by mouth every 6 hours as needed for moderate to severe pain (Patient not taking: Reported on 11/30/2020) 15 tablet 0     meclizine (ANTIVERT) 25 MG tablet Take 1 tablet (25 mg) by mouth 3 times daily as needed for dizziness (Patient not taking: Reported on 3/29/2021) 30 tablet 1     propranolol (INDERAL) 10 MG tablet Take 1 tablet (10 mg) by mouth 3 times daily (Patient not taking: Reported on 11/30/2020) 90 tablet 11     sennosides (SENOKOT) 8.6 MG tablet Take 1-3 tablets by mouth daily as needed for constipation (Patient not taking: Reported on 11/30/2020) 20 tablet 0     triamcinolone (NASACORT) 55 MCG/ACT nasal aerosol Spray 1 spray into both " nostrils daily       vitamin D3 (CHOLECALCIFEROL) 50 mcg (2000 units) tablet Take 1 tablet by mouth daily         Social History     Tobacco Use     Smoking status: Former Smoker     Quit date: 1979     Years since quittin.2     Smokeless tobacco: Never Used   Substance Use Topics     Alcohol use: Not Currently     Alcohol/week: 0.0 standard drinks     Drug use: DAVID Liang  3/29/2021  9:06 AM

## 2021-03-30 PROBLEM — N20.0 KIDNEY STONE: Status: ACTIVE | Noted: 2021-03-30

## 2021-03-31 DIAGNOSIS — N20.0 KIDNEY STONE: Primary | ICD-10-CM

## 2021-04-29 DIAGNOSIS — Z11.59 ENCOUNTER FOR SCREENING FOR OTHER VIRAL DISEASES: ICD-10-CM

## 2021-05-04 ENCOUNTER — OFFICE VISIT (OUTPATIENT)
Dept: INTERNAL MEDICINE | Facility: CLINIC | Age: 71
End: 2021-05-04
Payer: MEDICARE

## 2021-05-04 DIAGNOSIS — E78.2 MIXED HYPERLIPIDEMIA: ICD-10-CM

## 2021-05-04 DIAGNOSIS — Z01.818 PREOP GENERAL PHYSICAL EXAM: Primary | ICD-10-CM

## 2021-05-04 DIAGNOSIS — N20.0 STONE, KIDNEY: ICD-10-CM

## 2021-05-04 DIAGNOSIS — F41.1 GENERALIZED ANXIETY DISORDER: ICD-10-CM

## 2021-05-04 LAB — HGB BLD-MCNC: 14.3 G/DL (ref 11.7–15.7)

## 2021-05-04 PROCEDURE — 99214 OFFICE O/P EST MOD 30 MIN: CPT | Performed by: INTERNAL MEDICINE

## 2021-05-04 PROCEDURE — 85018 HEMOGLOBIN: CPT | Performed by: INTERNAL MEDICINE

## 2021-05-04 PROCEDURE — 93000 ELECTROCARDIOGRAM COMPLETE: CPT | Performed by: INTERNAL MEDICINE

## 2021-05-04 PROCEDURE — 36415 COLL VENOUS BLD VENIPUNCTURE: CPT | Performed by: INTERNAL MEDICINE

## 2021-05-04 PROCEDURE — 84132 ASSAY OF SERUM POTASSIUM: CPT | Performed by: INTERNAL MEDICINE

## 2021-05-04 ASSESSMENT — MIFFLIN-ST. JEOR: SCORE: 1184

## 2021-05-04 NOTE — H&P (VIEW-ONLY)
[place] [address]  [facility] [address]  Phone: [phone]  Primary Provider: Asa Miguel  Pre-op Performing Provider: ASA MIGUEL      PREOPERATIVE EVALUATION:  Today's date: 5/4/2021    Carola Cox is a 71 year old female who presents for a preoperative evaluation.    Surgical Information:  Surgery/Procedure:BILATERAL EXTRACORPOREAL SHOCK WAVE LITHOTRIPSY  Surgery Location: [facility]   Surgeon: Dr. Clark  Surgery Date: 5/13/21  Time of Surgery: 1300  Where patient plans to recover: At home with family  Fax number for surgical facility: Note does not need to be faxed, will be available electronically in Epic.    Type of Anesthesia Anticipated: General    Assessment & Plan     The proposed surgical procedure is considered LOW risk.     (Z01.818) Preop general physical exam  (primary encounter diagnosis)  Comment: BILATERAL EXTRACORPOREAL SHOCK WAVE LITHOTRIPSY  Plan: EKG 12-lead complete w/read - Clinics,         Potassium, Hemoglobin       (N20.0) Stone, kidney   Plan: BILATERAL EXTRACORPOREAL SHOCK WAVE LITHOTRIPSY    (E78.2) Mixed hyperlipidemia  Plan: on simvastatin     (F41.1) Generalized anxiety disorder  Plan: stable , not on any meds       Risks and Recommendations:  The patient has the following additional risks and recommendations for perioperative complications:   - No identified additional risk factors other than previously addressed    Medication Instructions:   - aspirin: Discontinue aspirin 7 days prior to procedure to reduce bleeding risk.    -Hold NSAID's 3 days before surgery     RECOMMENDATION:  APPROVAL GIVEN to proceed with proposed procedure, without further diagnostic evaluation.    Review of the result(s) of each unique test - Hgb, Potassium  Independent interpretation of a test  - EKG       Subjective     HPI related to upcoming procedure: h/o kidney stones and getting BILATERAL EXTRACORPOREAL SHOCK WAVE  LITHOTRIPSY    Preop Questions 5/4/2021   1. Have you ever had a heart attack or stroke? No   2. Have you ever had surgery on your heart or blood vessels, such as a stent placement, a coronary artery bypass, or surgery on an artery in your head, neck, heart, or legs? No   3. Do you have chest pain with activity? No   4. Do you have a history of  heart failure? No   5. Do you currently have a cold, bronchitis or symptoms of other infection? No   6. Do you have a cough, shortness of breath, or wheezing? No   7. Do you or anyone in your family have previous history of blood clots? No   8. Do you or does anyone in your family have a serious bleeding problem such as prolonged bleeding following surgeries or cuts? No   9. Have you ever had problems with anemia or been told to take iron pills? No   10. Have you had any abnormal blood loss such as black, tarry or bloody stools, or abnormal vaginal bleeding? No   11. Have you ever had a blood transfusion? No   12. Are you willing to have a blood transfusion if it is medically needed before, during, or after your surgery? Yes   13. Have you or any of your relatives ever had problems with anesthesia? YES -nausea    14. Do you have sleep apnea, excessive snoring or daytime drowsiness? YES - snoring    14a. Do you have a CPAP machine? No   15. Do you have any artifical heart valves or other implanted medical devices like a pacemaker, defibrillator, or continuous glucose monitor? No   16. Do you have artificial joints? No   17. Are you allergic to latex? No   18. Is there any chance that you may be pregnant? -       Health Care Directive:  Patient does not have a Health Care Directive or Living Will: Discussed advance care planning with patient; information given to patient to review.  Patient will bring copy.       Status of Chronic Conditions:  HYPERLIPIDEMIA - Patient has a long history of significant Hyperlipidemia requiring medication for treatment with recent good control.  Patient reports no problems or side effects with the medication.     H/o Anxiety ; stable , was on prn propranolol but has been taking. not on any meds at this time     Review of Systems  CONSTITUTIONAL: NEGATIVE for fever, chills, change in weight  INTEGUMENTARY/SKIN: NEGATIVE for worrisome rashes, moles or lesions  EYES: NEGATIVE for vision changes or irritation  ENT/MOUTH: NEGATIVE for ear, mouth and throat problems  RESP: NEGATIVE for significant cough or SOB  CV: NEGATIVE for chest pain, palpitations or peripheral edema  GI: NEGATIVE for nausea, abdominal pain, heartburn, or change in bowel habits  : kidney stone NEGATIVE for frequency,    MUSCULOSKELETAL: NEGATIVE for significant arthralgias or myalgia  NEURO: NEGATIVE for weakness, dizziness or paresthesias  ENDOCRINE: NEGATIVE for temperature intolerance, skin/hair changes  HEME: NEGATIVE for bleeding problems  PSYCHIATRIC: NEGATIVE for changes in mood or affect    Patient Active Problem List    Diagnosis Date Noted     Kidney stone 03/30/2021     Priority: Medium     Added automatically from request for surgery [id number]       Stone, kidney 11/03/2020     Priority: Medium     Nephrolithiasis 09/18/2020     Priority: Medium     Added automatically from request for surgery [id number]       Kidney stones 08/05/2020     Priority: Medium     H/O wheezing 11/13/2017     Priority: Medium     Allergy induced, takes albuterol prn        Hyperlipidemia      Priority: Medium     Cardiac dysrhythmia      Priority: Medium     beta blocker for this in past       Atrophic vaginitis      Priority: Medium     Osteoporosis      Priority: Medium     Generalized anxiety disorder      Priority: Medium     cannot tolerate SSRIs. Trial of 12.5mg sertraline and had severe nausea, diarrhea, dizziness. low dose propranolol prn and counseling        BPPV (benign paroxysmal positional vertigo)      Priority: Medium     CARDIOVASCULAR SCREENING; LDL GOAL LESS THAN 160 12/31/2013      Priority: Medium      Past Medical History:   Diagnosis Date     Atrophic vaginitis      BPPV (benign paroxysmal positional vertigo)      Cardiac dysrhythmia     beta blocker for this in past     Complication of anesthesia      Endometriosis of pelvic peritoneum      Generalized anxiety disorder 2014    cannot tolerate SSRIs. Trial of 12.5mg sertraline and had severe nausea, diarrhea, dizziness. low dose propranolol prn and counseling      History of postmenopausal HRT      Hyperlipidemia      Mumps      Osteoporosis      Palpitations      PONV (postoperative nausea and vomiting)      Past Surgical History:   Procedure Laterality Date     COMBINED CYSTOSCOPY, INSERT STENT URETER(S) Right 11/3/2020    Procedure: Flexible cystoscopy with right sided ureteral catheter and stent placement;  Surgeon: Chip Clark MD;  Location:  OR     CYSTOSCOPY       HYSTERECTOMY TOTAL ABDOMINAL, BILATERAL SALPINGO-OOPHORECTOMY, COMBINED      Endometriosis.     IR RENAL STONE REMOVAL RIGHT  11/3/2020     LASER HOLMIUM NEPHROLITHOTOMY VIA PERCUTANEOUS NEPHROSTOMY Right 11/3/2020    Procedure: Laser holmium nephrolithotomy via percutaneous nephrostomy, stent insertion;  Surgeon: Chip Clark MD;  Location:  OR     LITHOTRIPSY       PERCUTANEOUS NEPHROLITHOTOMY Right 11/3/2020    Procedure: Right percutaneous nephrolithotomy, Holmium laser lithotripsy, stent placement;  Surgeon: Chip Clark MD;  Location:  OR     Current Outpatient Medications   Medication Sig Dispense Refill     albuterol (VENTOLIN HFA) 108 (90 Base) MCG/ACT inhaler INHALE 2 PUFFS INTO THE LUNGS EVERY 6 HOURS PRN 1 Inhaler 3     famotidine (PEPCID) 20 MG tablet Take 20 mg by mouth nightly as needed        meclizine (ANTIVERT) 25 MG tablet Take 1 tablet (25 mg) by mouth 3 times daily as needed for dizziness 30 tablet 1     simvastatin (ZOCOR) 20 MG tablet TAKE 1 TABLET BY MOUTH EVERYDAY AT BEDTIME - NEEDS LABS FOR REFILLS 90 tablet  "2     triamcinolone (NASACORT) 55 MCG/ACT nasal aerosol Spray 1 spray into both nostrils daily       vitamin D3 (CHOLECALCIFEROL) 50 mcg (2000 units) tablet Take 1 tablet by mouth daily         Allergies   Allergen Reactions     Cefprozil Hives     Cefzil     Penicillin V Rash     As a young adult          Social History     Tobacco Use     Smoking status: Former Smoker     Quit date: 1979     Years since quittin.3     Smokeless tobacco: Never Used   Substance Use Topics     Alcohol use: Not Currently     Alcohol/week: 0.0 standard drinks     Family History   Problem Relation Age of Onset     Hyperlipidemia Father      Hypertension Father      Breast Cancer Maternal Grandmother      Heart Disease Other         Family History     Thyroid Disease Sister      History   Drug Use No         Objective     BP (!) 144/88   Pulse 107   Temp 98.5  F (36.9  C) (Oral)   Resp 14   Ht 1.626 m (5' 4\")   Wt 69.4 kg (153 lb)   SpO2 96%   BMI 26.26 kg/m      Physical Exam    GENERAL APPEARANCE: healthy, alert and no distress     EYES: EOMI, PERRL      NECK: no adenopathy, no asymmetry, masses, or scars and thyroid normal to palpation     RESP: lungs clear to auscultation - no rales, rhonchi or wheezes     CV: regular rates and rhythm, normal S1 S2, no S3 or S4 and no murmur, click or rub     ABDOMEN:  soft, nontender, no HSM or masses and bowel sounds normal     MS: extremities normal- no gross deformities noted, no evidence of inflammation in joints, FROM in all extremities.     NEURO: Normal strength and tone, sensory exam grossly normal, mentation intact and speech normal     PSYCH: mentation appears normal. and affect normal/bright     LYMPHATICS: No cervical adenopathy    Recent Labs   Lab Test 20  0856 20  0623 20  0549   HGB 13.7 15.2 13.1     --  202     --  141   POTASSIUM 3.6 3.4 3.3*   CR 0.85  --  0.75        Diagnostics:  Recent Results (from the past 168 hour(s)) "   Potassium    Collection Time: 05/04/21  1:44 PM   Result Value Ref Range    Potassium 3.8 3.4 - 5.3 mmol/L   Hemoglobin    Collection Time: 05/04/21  1:44 PM   Result Value Ref Range    Hemoglobin 14.3 11.7 - 15.7 g/dL        EKG: appears normal, NSR,  normal intervals, no acute ST/T changes c/w ischemia, no LVH by voltage criteria,      Revised Cardiac Risk Index (RCRI):  The patient has the following serious cardiovascular risks for perioperative complications:   - No serious cardiac risks = 0 points       Signed Electronically by: Leann Sue MD  Copy of this evaluation report is provided to requesting physician.

## 2021-05-04 NOTE — NURSING NOTE
"BP (!) 156/90   Pulse 107   Temp 98.5  F (36.9  C) (Oral)   Resp 14   Ht 1.626 m (5' 4\")   Wt 69.4 kg (153 lb)   SpO2 96%   BMI 26.26 kg/m    Patient in for Pre-Op.  Lyndsey Arredondo CMA    "

## 2021-05-04 NOTE — PROGRESS NOTES
[place] [address]  [facility] [address]  Phone: [phone]  Primary Provider: Asa Miguel  Pre-op Performing Provider: ASA MIGUEL      PREOPERATIVE EVALUATION:  Today's date: 5/4/2021    Carola Cox is a 71 year old female who presents for a preoperative evaluation.    Surgical Information:  Surgery/Procedure:BILATERAL EXTRACORPOREAL SHOCK WAVE LITHOTRIPSY  Surgery Location: [facility]   Surgeon: Dr. Clark  Surgery Date: 5/13/21  Time of Surgery: 1300  Where patient plans to recover: At home with family  Fax number for surgical facility: Note does not need to be faxed, will be available electronically in Epic.    Type of Anesthesia Anticipated: General    Assessment & Plan     The proposed surgical procedure is considered LOW risk.     (Z01.818) Preop general physical exam  (primary encounter diagnosis)  Comment: BILATERAL EXTRACORPOREAL SHOCK WAVE LITHOTRIPSY  Plan: EKG 12-lead complete w/read - Clinics,         Potassium, Hemoglobin       (N20.0) Stone, kidney   Plan: BILATERAL EXTRACORPOREAL SHOCK WAVE LITHOTRIPSY    (E78.2) Mixed hyperlipidemia  Plan: on simvastatin     (F41.1) Generalized anxiety disorder  Plan: stable , not on any meds       Risks and Recommendations:  The patient has the following additional risks and recommendations for perioperative complications:   - No identified additional risk factors other than previously addressed    Medication Instructions:   - aspirin: Discontinue aspirin 7 days prior to procedure to reduce bleeding risk.    -Hold NSAID's 3 days before surgery     RECOMMENDATION:  APPROVAL GIVEN to proceed with proposed procedure, without further diagnostic evaluation.    Review of the result(s) of each unique test - Hgb, Potassium  Independent interpretation of a test  - EKG       Subjective     HPI related to upcoming procedure: h/o kidney stones and getting BILATERAL EXTRACORPOREAL SHOCK WAVE  LITHOTRIPSY    Preop Questions 5/4/2021   1. Have you ever had a heart attack or stroke? No   2. Have you ever had surgery on your heart or blood vessels, such as a stent placement, a coronary artery bypass, or surgery on an artery in your head, neck, heart, or legs? No   3. Do you have chest pain with activity? No   4. Do you have a history of  heart failure? No   5. Do you currently have a cold, bronchitis or symptoms of other infection? No   6. Do you have a cough, shortness of breath, or wheezing? No   7. Do you or anyone in your family have previous history of blood clots? No   8. Do you or does anyone in your family have a serious bleeding problem such as prolonged bleeding following surgeries or cuts? No   9. Have you ever had problems with anemia or been told to take iron pills? No   10. Have you had any abnormal blood loss such as black, tarry or bloody stools, or abnormal vaginal bleeding? No   11. Have you ever had a blood transfusion? No   12. Are you willing to have a blood transfusion if it is medically needed before, during, or after your surgery? Yes   13. Have you or any of your relatives ever had problems with anesthesia? YES -nausea    14. Do you have sleep apnea, excessive snoring or daytime drowsiness? YES - snoring    14a. Do you have a CPAP machine? No   15. Do you have any artifical heart valves or other implanted medical devices like a pacemaker, defibrillator, or continuous glucose monitor? No   16. Do you have artificial joints? No   17. Are you allergic to latex? No   18. Is there any chance that you may be pregnant? -       Health Care Directive:  Patient does not have a Health Care Directive or Living Will: Discussed advance care planning with patient; information given to patient to review.  Patient will bring copy.       Status of Chronic Conditions:  HYPERLIPIDEMIA - Patient has a long history of significant Hyperlipidemia requiring medication for treatment with recent good control.  Patient reports no problems or side effects with the medication.     H/o Anxiety ; stable , was on prn propranolol but has been taking. not on any meds at this time     Review of Systems  CONSTITUTIONAL: NEGATIVE for fever, chills, change in weight  INTEGUMENTARY/SKIN: NEGATIVE for worrisome rashes, moles or lesions  EYES: NEGATIVE for vision changes or irritation  ENT/MOUTH: NEGATIVE for ear, mouth and throat problems  RESP: NEGATIVE for significant cough or SOB  CV: NEGATIVE for chest pain, palpitations or peripheral edema  GI: NEGATIVE for nausea, abdominal pain, heartburn, or change in bowel habits  : kidney stone NEGATIVE for frequency,    MUSCULOSKELETAL: NEGATIVE for significant arthralgias or myalgia  NEURO: NEGATIVE for weakness, dizziness or paresthesias  ENDOCRINE: NEGATIVE for temperature intolerance, skin/hair changes  HEME: NEGATIVE for bleeding problems  PSYCHIATRIC: NEGATIVE for changes in mood or affect    Patient Active Problem List    Diagnosis Date Noted     Kidney stone 03/30/2021     Priority: Medium     Added automatically from request for surgery [id number]       Stone, kidney 11/03/2020     Priority: Medium     Nephrolithiasis 09/18/2020     Priority: Medium     Added automatically from request for surgery [id number]       Kidney stones 08/05/2020     Priority: Medium     H/O wheezing 11/13/2017     Priority: Medium     Allergy induced, takes albuterol prn        Hyperlipidemia      Priority: Medium     Cardiac dysrhythmia      Priority: Medium     beta blocker for this in past       Atrophic vaginitis      Priority: Medium     Osteoporosis      Priority: Medium     Generalized anxiety disorder      Priority: Medium     cannot tolerate SSRIs. Trial of 12.5mg sertraline and had severe nausea, diarrhea, dizziness. low dose propranolol prn and counseling        BPPV (benign paroxysmal positional vertigo)      Priority: Medium     CARDIOVASCULAR SCREENING; LDL GOAL LESS THAN 160 12/31/2013      Priority: Medium      Past Medical History:   Diagnosis Date     Atrophic vaginitis      BPPV (benign paroxysmal positional vertigo)      Cardiac dysrhythmia     beta blocker for this in past     Complication of anesthesia      Endometriosis of pelvic peritoneum      Generalized anxiety disorder 2014    cannot tolerate SSRIs. Trial of 12.5mg sertraline and had severe nausea, diarrhea, dizziness. low dose propranolol prn and counseling      History of postmenopausal HRT      Hyperlipidemia      Mumps      Osteoporosis      Palpitations      PONV (postoperative nausea and vomiting)      Past Surgical History:   Procedure Laterality Date     COMBINED CYSTOSCOPY, INSERT STENT URETER(S) Right 11/3/2020    Procedure: Flexible cystoscopy with right sided ureteral catheter and stent placement;  Surgeon: Chip Clark MD;  Location:  OR     CYSTOSCOPY       HYSTERECTOMY TOTAL ABDOMINAL, BILATERAL SALPINGO-OOPHORECTOMY, COMBINED      Endometriosis.     IR RENAL STONE REMOVAL RIGHT  11/3/2020     LASER HOLMIUM NEPHROLITHOTOMY VIA PERCUTANEOUS NEPHROSTOMY Right 11/3/2020    Procedure: Laser holmium nephrolithotomy via percutaneous nephrostomy, stent insertion;  Surgeon: Chip Clark MD;  Location:  OR     LITHOTRIPSY       PERCUTANEOUS NEPHROLITHOTOMY Right 11/3/2020    Procedure: Right percutaneous nephrolithotomy, Holmium laser lithotripsy, stent placement;  Surgeon: Chip Clark MD;  Location:  OR     Current Outpatient Medications   Medication Sig Dispense Refill     albuterol (VENTOLIN HFA) 108 (90 Base) MCG/ACT inhaler INHALE 2 PUFFS INTO THE LUNGS EVERY 6 HOURS PRN 1 Inhaler 3     famotidine (PEPCID) 20 MG tablet Take 20 mg by mouth nightly as needed        meclizine (ANTIVERT) 25 MG tablet Take 1 tablet (25 mg) by mouth 3 times daily as needed for dizziness 30 tablet 1     simvastatin (ZOCOR) 20 MG tablet TAKE 1 TABLET BY MOUTH EVERYDAY AT BEDTIME - NEEDS LABS FOR REFILLS 90 tablet  "2     triamcinolone (NASACORT) 55 MCG/ACT nasal aerosol Spray 1 spray into both nostrils daily       vitamin D3 (CHOLECALCIFEROL) 50 mcg (2000 units) tablet Take 1 tablet by mouth daily         Allergies   Allergen Reactions     Cefprozil Hives     Cefzil     Penicillin V Rash     As a young adult          Social History     Tobacco Use     Smoking status: Former Smoker     Quit date: 1979     Years since quittin.3     Smokeless tobacco: Never Used   Substance Use Topics     Alcohol use: Not Currently     Alcohol/week: 0.0 standard drinks     Family History   Problem Relation Age of Onset     Hyperlipidemia Father      Hypertension Father      Breast Cancer Maternal Grandmother      Heart Disease Other         Family History     Thyroid Disease Sister      History   Drug Use No         Objective     BP (!) 144/88   Pulse 107   Temp 98.5  F (36.9  C) (Oral)   Resp 14   Ht 1.626 m (5' 4\")   Wt 69.4 kg (153 lb)   SpO2 96%   BMI 26.26 kg/m      Physical Exam    GENERAL APPEARANCE: healthy, alert and no distress     EYES: EOMI, PERRL      NECK: no adenopathy, no asymmetry, masses, or scars and thyroid normal to palpation     RESP: lungs clear to auscultation - no rales, rhonchi or wheezes     CV: regular rates and rhythm, normal S1 S2, no S3 or S4 and no murmur, click or rub     ABDOMEN:  soft, nontender, no HSM or masses and bowel sounds normal     MS: extremities normal- no gross deformities noted, no evidence of inflammation in joints, FROM in all extremities.     NEURO: Normal strength and tone, sensory exam grossly normal, mentation intact and speech normal     PSYCH: mentation appears normal. and affect normal/bright     LYMPHATICS: No cervical adenopathy    Recent Labs   Lab Test 20  0856 20  0623 20  0549   HGB 13.7 15.2 13.1     --  202     --  141   POTASSIUM 3.6 3.4 3.3*   CR 0.85  --  0.75        Diagnostics:  Recent Results (from the past 168 hour(s)) "   Potassium    Collection Time: 05/04/21  1:44 PM   Result Value Ref Range    Potassium 3.8 3.4 - 5.3 mmol/L   Hemoglobin    Collection Time: 05/04/21  1:44 PM   Result Value Ref Range    Hemoglobin 14.3 11.7 - 15.7 g/dL        EKG: appears normal, NSR,  normal intervals, no acute ST/T changes c/w ischemia, no LVH by voltage criteria,      Revised Cardiac Risk Index (RCRI):  The patient has the following serious cardiovascular risks for perioperative complications:   - No serious cardiac risks = 0 points       Signed Electronically by: Leann Sue MD  Copy of this evaluation report is provided to requesting physician.

## 2021-05-05 LAB — POTASSIUM SERPL-SCNC: 3.8 MMOL/L (ref 3.4–5.3)

## 2021-05-07 ENCOUNTER — RESEARCH ENCOUNTER (OUTPATIENT)
Dept: NEUROLOGY | Facility: CLINIC | Age: 71
End: 2021-05-07

## 2021-05-08 VITALS
WEIGHT: 153 LBS | HEIGHT: 64 IN | SYSTOLIC BLOOD PRESSURE: 144 MMHG | OXYGEN SATURATION: 96 % | DIASTOLIC BLOOD PRESSURE: 88 MMHG | HEART RATE: 107 BPM | BODY MASS INDEX: 26.12 KG/M2 | TEMPERATURE: 98.5 F | RESPIRATION RATE: 14 BRPM

## 2021-05-10 ENCOUNTER — HOSPITAL ENCOUNTER (OUTPATIENT)
Dept: LAB | Facility: CLINIC | Age: 71
Discharge: HOME | End: 2021-05-10
Attending: UROLOGY | Admitting: UROLOGY
Payer: MEDICARE

## 2021-05-10 DIAGNOSIS — Z11.59 ENCOUNTER FOR SCREENING FOR OTHER VIRAL DISEASES: ICD-10-CM

## 2021-05-10 LAB
LABORATORY COMMENT REPORT: NORMAL
SARS-COV-2 RNA RESP QL NAA+PROBE: NEGATIVE
SARS-COV-2 RNA RESP QL NAA+PROBE: NORMAL
SPECIMEN SOURCE: NORMAL
SPECIMEN SOURCE: NORMAL

## 2021-05-10 PROCEDURE — U0005 INFEC AGEN DETEC AMPLI PROBE: HCPCS | Performed by: UROLOGY

## 2021-05-10 PROCEDURE — U0003 INFECTIOUS AGENT DETECTION BY NUCLEIC ACID (DNA OR RNA); SEVERE ACUTE RESPIRATORY SYNDROME CORONAVIRUS 2 (SARS-COV-2) (CORONAVIRUS DISEASE [COVID-19]), AMPLIFIED PROBE TECHNIQUE, MAKING USE OF HIGH THROUGHPUT TECHNOLOGIES AS DESCRIBED BY CMS-2020-01-R: HCPCS | Performed by: UROLOGY

## 2021-05-12 RX ORDER — PROPRANOLOL HYDROCHLORIDE 10 MG/1
5 TABLET ORAL 2 TIMES DAILY PRN
COMMUNITY
End: 2021-12-07

## 2021-05-13 ENCOUNTER — ANESTHESIA EVENT (OUTPATIENT)
Dept: SURGERY | Facility: CLINIC | Age: 71
End: 2021-05-13
Payer: MEDICARE

## 2021-05-13 ENCOUNTER — ANESTHESIA (OUTPATIENT)
Dept: SURGERY | Facility: CLINIC | Age: 71
End: 2021-05-13
Payer: MEDICARE

## 2021-05-13 ENCOUNTER — APPOINTMENT (OUTPATIENT)
Dept: GENERAL RADIOLOGY | Facility: CLINIC | Age: 71
End: 2021-05-13
Attending: UROLOGY
Payer: MEDICARE

## 2021-05-13 ENCOUNTER — HOSPITAL ENCOUNTER (OUTPATIENT)
Facility: CLINIC | Age: 71
Discharge: HOME | End: 2021-05-13
Attending: UROLOGY | Admitting: UROLOGY
Payer: MEDICARE

## 2021-05-13 VITALS
HEART RATE: 78 BPM | BODY MASS INDEX: 26.12 KG/M2 | TEMPERATURE: 96.9 F | WEIGHT: 153 LBS | RESPIRATION RATE: 16 BRPM | DIASTOLIC BLOOD PRESSURE: 85 MMHG | OXYGEN SATURATION: 100 % | SYSTOLIC BLOOD PRESSURE: 164 MMHG | HEIGHT: 64 IN

## 2021-05-13 PROCEDURE — 710N000009 HC RECOVERY PHASE 1, LEVEL 1, PER MIN: Performed by: UROLOGY

## 2021-05-13 PROCEDURE — 250N000009 HC RX 250: Performed by: UROLOGY

## 2021-05-13 PROCEDURE — 250N000012 HC RX MED GY IP 250 OP 636 PS 637: Mod: GY | Performed by: ANESTHESIOLOGY

## 2021-05-13 PROCEDURE — 250N000009 HC RX 250: Performed by: NURSE ANESTHETIST, CERTIFIED REGISTERED

## 2021-05-13 PROCEDURE — 272N000001 HC OR GENERAL SUPPLY STERILE: Performed by: UROLOGY

## 2021-05-13 PROCEDURE — 258N000003 HC RX IP 258 OP 636: Performed by: NURSE ANESTHETIST, CERTIFIED REGISTERED

## 2021-05-13 PROCEDURE — C1769 GUIDE WIRE: HCPCS | Performed by: UROLOGY

## 2021-05-13 PROCEDURE — C2617 STENT, NON-COR, TEM W/O DEL: HCPCS | Performed by: UROLOGY

## 2021-05-13 PROCEDURE — 250N000025 HC SEVOFLURANE, PER MIN: Performed by: UROLOGY

## 2021-05-13 PROCEDURE — 250N000011 HC RX IP 250 OP 636: Performed by: ANESTHESIOLOGY

## 2021-05-13 PROCEDURE — 370N000017 HC ANESTHESIA TECHNICAL FEE, PER MIN: Performed by: UROLOGY

## 2021-05-13 PROCEDURE — 74019 RADEX ABDOMEN 2 VIEWS: CPT

## 2021-05-13 PROCEDURE — 999N000094 HC STATISTIC LITHOTRIPSY IDENTIFIER: Performed by: UROLOGY

## 2021-05-13 PROCEDURE — 710N000012 HC RECOVERY PHASE 2, PER MINUTE: Performed by: UROLOGY

## 2021-05-13 PROCEDURE — 360N000077 HC SURGERY LEVEL 4, PER MIN: Performed by: UROLOGY

## 2021-05-13 PROCEDURE — 52332 CYSTOSCOPY AND TREATMENT: CPT | Mod: LT | Performed by: UROLOGY

## 2021-05-13 PROCEDURE — 999N000141 HC STATISTIC PRE-PROCEDURE NURSING ASSESSMENT: Performed by: UROLOGY

## 2021-05-13 PROCEDURE — 50590 FRAGMENTING OF KIDNEY STONE: CPT | Mod: 50 | Performed by: UROLOGY

## 2021-05-13 PROCEDURE — 250N000011 HC RX IP 250 OP 636: Performed by: NURSE ANESTHETIST, CERTIFIED REGISTERED

## 2021-05-13 DEVICE — URETERAL STENT
Type: IMPLANTABLE DEVICE | Site: BLADDER | Status: FUNCTIONAL
Brand: POLARIS™ ULTRA

## 2021-05-13 RX ORDER — ATROPA BELLADONNA AND OPIUM 16.2; 3 MG/1; MG/1
SUPPOSITORY RECTAL PRN
Status: DISCONTINUED | OUTPATIENT
Start: 2021-05-13 | End: 2021-05-13 | Stop reason: HOSPADM

## 2021-05-13 RX ORDER — CIPROFLOXACIN 2 MG/ML
INJECTION, SOLUTION INTRAVENOUS PRN
Status: DISCONTINUED | OUTPATIENT
Start: 2021-05-13 | End: 2021-05-13

## 2021-05-13 RX ORDER — ONDANSETRON 2 MG/ML
INJECTION INTRAMUSCULAR; INTRAVENOUS PRN
Status: DISCONTINUED | OUTPATIENT
Start: 2021-05-13 | End: 2021-05-13

## 2021-05-13 RX ORDER — ONDANSETRON 2 MG/ML
4 INJECTION INTRAMUSCULAR; INTRAVENOUS EVERY 30 MIN PRN
Status: DISCONTINUED | OUTPATIENT
Start: 2021-05-13 | End: 2021-05-13 | Stop reason: HOSPADM

## 2021-05-13 RX ORDER — NALOXONE HYDROCHLORIDE 0.4 MG/ML
0.2 INJECTION, SOLUTION INTRAMUSCULAR; INTRAVENOUS; SUBCUTANEOUS
Status: DISCONTINUED | OUTPATIENT
Start: 2021-05-13 | End: 2021-05-13 | Stop reason: HOSPADM

## 2021-05-13 RX ORDER — LIDOCAINE HYDROCHLORIDE 20 MG/ML
INJECTION, SOLUTION INFILTRATION; PERINEURAL PRN
Status: DISCONTINUED | OUTPATIENT
Start: 2021-05-13 | End: 2021-05-13

## 2021-05-13 RX ORDER — FENTANYL CITRATE 50 UG/ML
25-50 INJECTION, SOLUTION INTRAMUSCULAR; INTRAVENOUS
Status: DISCONTINUED | OUTPATIENT
Start: 2021-05-13 | End: 2021-05-13 | Stop reason: HOSPADM

## 2021-05-13 RX ORDER — PROPOFOL 10 MG/ML
INJECTION, EMULSION INTRAVENOUS PRN
Status: DISCONTINUED | OUTPATIENT
Start: 2021-05-13 | End: 2021-05-13

## 2021-05-13 RX ORDER — DEXAMETHASONE SODIUM PHOSPHATE 4 MG/ML
INJECTION, SOLUTION INTRA-ARTICULAR; INTRALESIONAL; INTRAMUSCULAR; INTRAVENOUS; SOFT TISSUE PRN
Status: DISCONTINUED | OUTPATIENT
Start: 2021-05-13 | End: 2021-05-13

## 2021-05-13 RX ORDER — FENTANYL CITRATE 50 UG/ML
INJECTION, SOLUTION INTRAMUSCULAR; INTRAVENOUS PRN
Status: DISCONTINUED | OUTPATIENT
Start: 2021-05-13 | End: 2021-05-13

## 2021-05-13 RX ORDER — APREPITANT 40 MG/1
40 CAPSULE ORAL DAILY
Status: DISCONTINUED | OUTPATIENT
Start: 2021-05-13 | End: 2021-05-13 | Stop reason: HOSPADM

## 2021-05-13 RX ORDER — PROPOFOL 10 MG/ML
INJECTION, EMULSION INTRAVENOUS CONTINUOUS PRN
Status: DISCONTINUED | OUTPATIENT
Start: 2021-05-13 | End: 2021-05-13

## 2021-05-13 RX ORDER — NALOXONE HYDROCHLORIDE 0.4 MG/ML
0.4 INJECTION, SOLUTION INTRAMUSCULAR; INTRAVENOUS; SUBCUTANEOUS
Status: DISCONTINUED | OUTPATIENT
Start: 2021-05-13 | End: 2021-05-13 | Stop reason: HOSPADM

## 2021-05-13 RX ORDER — SODIUM CHLORIDE, SODIUM LACTATE, POTASSIUM CHLORIDE, CALCIUM CHLORIDE 600; 310; 30; 20 MG/100ML; MG/100ML; MG/100ML; MG/100ML
INJECTION, SOLUTION INTRAVENOUS CONTINUOUS
Status: DISCONTINUED | OUTPATIENT
Start: 2021-05-13 | End: 2021-05-13 | Stop reason: HOSPADM

## 2021-05-13 RX ORDER — SODIUM CHLORIDE, SODIUM LACTATE, POTASSIUM CHLORIDE, CALCIUM CHLORIDE 600; 310; 30; 20 MG/100ML; MG/100ML; MG/100ML; MG/100ML
INJECTION, SOLUTION INTRAVENOUS CONTINUOUS PRN
Status: DISCONTINUED | OUTPATIENT
Start: 2021-05-13 | End: 2021-05-13

## 2021-05-13 RX ORDER — ONDANSETRON 4 MG/1
4 TABLET, ORALLY DISINTEGRATING ORAL EVERY 30 MIN PRN
Status: DISCONTINUED | OUTPATIENT
Start: 2021-05-13 | End: 2021-05-13 | Stop reason: HOSPADM

## 2021-05-13 RX ORDER — HYDROMORPHONE HYDROCHLORIDE 1 MG/ML
.3-.5 INJECTION, SOLUTION INTRAMUSCULAR; INTRAVENOUS; SUBCUTANEOUS EVERY 10 MIN PRN
Status: DISCONTINUED | OUTPATIENT
Start: 2021-05-13 | End: 2021-05-13 | Stop reason: HOSPADM

## 2021-05-13 RX ADMIN — FENTANYL CITRATE 50 MCG: 50 INJECTION, SOLUTION INTRAMUSCULAR; INTRAVENOUS at 14:22

## 2021-05-13 RX ADMIN — LIDOCAINE HYDROCHLORIDE 80 MG: 20 INJECTION, SOLUTION INFILTRATION; PERINEURAL at 14:22

## 2021-05-13 RX ADMIN — DEXAMETHASONE SODIUM PHOSPHATE 4 MG: 4 INJECTION, SOLUTION INTRA-ARTICULAR; INTRALESIONAL; INTRAMUSCULAR; INTRAVENOUS; SOFT TISSUE at 14:42

## 2021-05-13 RX ADMIN — PROPOFOL 150 MG: 10 INJECTION, EMULSION INTRAVENOUS at 14:22

## 2021-05-13 RX ADMIN — PHENYLEPHRINE HYDROCHLORIDE 100 MCG: 10 INJECTION INTRAVENOUS at 14:46

## 2021-05-13 RX ADMIN — SODIUM CHLORIDE, POTASSIUM CHLORIDE, SODIUM LACTATE AND CALCIUM CHLORIDE: 600; 310; 30; 20 INJECTION, SOLUTION INTRAVENOUS at 14:20

## 2021-05-13 RX ADMIN — ONDANSETRON 4 MG: 2 INJECTION INTRAMUSCULAR; INTRAVENOUS at 14:42

## 2021-05-13 RX ADMIN — ONDANSETRON 4 MG: 2 INJECTION INTRAMUSCULAR; INTRAVENOUS at 16:27

## 2021-05-13 RX ADMIN — PROPOFOL 40 MCG/KG/MIN: 10 INJECTION, EMULSION INTRAVENOUS at 14:23

## 2021-05-13 RX ADMIN — FENTANYL CITRATE 50 MCG: 50 INJECTION, SOLUTION INTRAMUSCULAR; INTRAVENOUS at 14:20

## 2021-05-13 RX ADMIN — CIPROFLOXACIN 400 MG: 2 INJECTION INTRAVENOUS at 14:30

## 2021-05-13 RX ADMIN — APREPITANT 40 MG: 40 CAPSULE ORAL at 12:24

## 2021-05-13 RX ADMIN — PHENYLEPHRINE HYDROCHLORIDE 100 MCG: 10 INJECTION INTRAVENOUS at 14:53

## 2021-05-13 ASSESSMENT — ENCOUNTER SYMPTOMS: DYSRHYTHMIAS: 1

## 2021-05-13 ASSESSMENT — MIFFLIN-ST. JEOR: SCORE: 1184

## 2021-05-13 NOTE — ANESTHESIA PROCEDURE NOTES
Airway       Patient location: [facility] - Operating Room.       Procedure Start/Stop Times: 5/13/2021 2:23 PM and 5/13/2021 2:23 PM  Staff -        CRNA: Canelo Stewart APRN CRNA       Performed By: CRNA  Consent for Airway        Urgency: elective  Indications and Patient Condition       Indications for airway management: sima-procedural       Induction type:intravenous       Mask difficulty assessment: 0 - not attempted    Final Airway Details       Final airway type: supraglottic airway    Supraglottic Airway Details        Type: LMA       Brand: LMA Unique       LMA size: 4    Post intubation assessment        Placement verified by: capnometry, equal breath sounds and chest rise        Number of attempts at approach: 1       Secured with: pink tape       Ease of procedure: easy       Dentition: Intact and Unchanged    Medication(s) Administered   Medication Administration Time: 5/13/2021 2:22 PM    Additional Comments         LMA Unique Size 4.

## 2021-05-13 NOTE — DISCHARGE INSTRUCTIONS
Same Day Surgery Discharge Instructions for  Sedation and General Anesthesia       It's not unusual to feel dizzy, light-headed or faint for up to 24 hours after surgery or while taking pain medication.  If you have these symptoms: sit for a few minutes before standing and have someone assist you when you get up to walk or use the bathroom.      You should rest and relax for the next 24 hours. We recommend you make arrangements to have an adult stay with you for at least 24 hours after your discharge.  Avoid hazardous and strenuous activity.      DO NOT DRIVE any vehicle or operate mechanical equipment for 24 hours following the end of your surgery.  Even though you may feel normal, your reactions may be affected by the medication you have received.      Do not drink alcoholic beverages for 24 hours following surgery.       Slowly progress to your regular diet as you feel able. It's not unusual to feel nauseated and/or vomit after receiving anesthesia.  If you develop these symptoms, drink clear liquids (apple juice, ginger ale, broth, 7-up, etc. ) until you feel better.  If your nausea and vomiting persists for 24 hours, please notify your surgeon.        All narcotic pain medications, along with inactivity and anesthesia, can cause constipation. Drinking plenty of liquids and increasing fiber intake will help.      For any questions of a medical nature, call your surgeon.      Do not make important decisions for 24 hours.      If you had general anesthesia, you may have a sore throat for a couple of days related to the breathing tube used during surgery.  You may use Cepacol lozenges to help with this discomfort.  If it worsens or if you develop a fever, contact your surgeon.       If you feel your pain is not well managed with the pain medications prescribed by your surgeon, please contact your surgeon's office to let them know so they can address your concerns.       CoVid 19 Information    We want to give you  information regarding Covid. Please consult your primary care provider with any questions you might have.     Patient who have symptoms (cough, fever, or shortness of breath), need to isolate for 7 days from when symptoms started OR 72 hours after fever resolves (without fever reducing medications) AND improvement of respiratory symptoms (whichever is longer).      Isolate yourself at home (in own room/own bathroom if possible)    Do Not allow any visitors    Do Not go to work or school    Do Not go to [employer],  centers, shopping, or other public places.    Do Not shake hands.    Avoid close and intimate contact with others (hugging, kissing).    Follow CDC recommendations for household cleaning of frequently touched services.     After the initial 7 days, continue to isolate yourself from household members as much as possible. To continue decrease the risk of community spread and exposure, you and any members of your household should limit activities in public for 14 days after starting home isolation.     You can reference the following CDC link for helpful home isolation/care tips:  https://www.cdc.gov/coronavirus/2019-ncov/downloads/10Things.pdf    Protect Others:    Cover Your Mouth and Nose with a mask, disposable tissue or wash cloth to avoid spreading germs to others.    Wash your hands and face frequently with soap and water    Call Your Primary Doctor If: Breathing difficulty develops or you become worse.    For more information about COVID19 and options for caring for yourself at home, please visit the CDC website at https://www.cdc.gov/coronavirus/2019-ncov/about/steps-when-sick.html  For more options for care at [facility], please visit our website at [email or web address]    DISCHARGE INSTRUCTIONS FOLLOWING EXTRACORPOREAL SHOCK LITHOTRIPSY (ESWL)      Your stone(s) has been fragmented into many tiny pieces, which must now pass in your urine.  Usually this  process is uneventful.  Most fragments pass in the first one or two week, but some may continue to pass for three months or more.  Some pain or discomfort may accompany the passage of these fragments.    To aid in the passage of fragments, drink lots of fluid.  Aim for 1 glass an hour for the next 1 to 2 weeks (2 quarts or more a day).  Most stone patients will benefit from a continued high fluid intake and urine output indefinitely, even after the fragments are gone.  This helps prevent new stone formation.    Strain your urine.  Take the stone fragments to your urologist.  They will have them analyzed to help determine the cause of your stone(s).    You should walk around and resume every day activities.  Activity may help the stone fragments pass.  You should, however, avoid sports or really strenuous exercise for about a week, or at least until there is no more blood in your urine.    You may resume regular diet.    Call your urologist if you have:  a. Persistent severe pain not relieved by oral medications.  b. Fever (over 101 ).  c. Persistent vomiting.    See your urologist as directed.  They will need to take an x-ray to check your progress.  Take your stone fragments to your follow-up appointment.    STENT INFORMATION SHEET  [facility]  MICKI Shannon M.D., STAN Torre M.D.,   SHIRIN Clark M.D., GALLITO Casas M.D.    [phone]    During surgery, a stent may be place in the ureter.  The ureter is the tube that drains urine from the kidney to the bladder.  The stent is placed to dilate (open) the ureter so the stone fragments can pass easily through the ureter or to decrease ureteral swelling after surgery, or to relieve an obstruction.    The stent is made of rubber.  The upper end of the stent curls in the kidney while the lower end rests in the bladder.    While the stent is in place you may experience the following symptoms:    Blood and/or small blood clots in urine.    Bladder spasm  (frequency and urgency of urination).    Discomfort or aching in the back or side where the stent is.    Burning or discomfort at the end of urine stream.    To decrease these symptoms you should:    Take pain medication as prescribed.    Drink plenty of fluids.    If you experience pain at the end of urination try not emptying your bladder completely.    If having discomfort in back or side, decrease activity.    Please call your physician or the physician on call if you experience:    Fever greater than 101 .    Severe pain not relieved by pain medication or rest.      Please make an appointment for removal of the stent according to your physician s instructions.    **If you have questions or concerns about your procedure,   call Dr. Clark at [phone]**

## 2021-05-13 NOTE — ANESTHESIA PREPROCEDURE EVALUATION
Anesthesia Pre-Procedure Evaluation    Patient: Carola Cox   MRN: [id number] : 1950        Preoperative Diagnosis: Kidney stone [N20.0]   Procedure : Procedure(s):  BILATERAL EXTRACORPOREAL SHOCK WAVE LITHOTRIPSY(ESWL)  CYSTOSCOPY, PLACEMENT LEFT URETERAL STENT INSERTION     Past Medical History:   Diagnosis Date     Atrophic vaginitis      BPPV (benign paroxysmal positional vertigo)      Cardiac dysrhythmia     beta blocker for this in past     Complication of anesthesia      Endometriosis of pelvic peritoneum      Generalized anxiety disorder 2014    cannot tolerate SSRIs. Trial of 12.5mg sertraline and had severe nausea, diarrhea, dizziness. low dose propranolol prn and counseling      History of postmenopausal HRT      Hyperlipidemia      Mumps      Osteoporosis      Palpitations      PONV (postoperative nausea and vomiting)       Past Surgical History:   Procedure Laterality Date     COMBINED CYSTOSCOPY, INSERT STENT URETER(S) Right 11/3/2020    Procedure: Flexible cystoscopy with right sided ureteral catheter and stent placement;  Surgeon: Chip Clark MD;  Location:  OR     CYSTOSCOPY       HYSTERECTOMY TOTAL ABDOMINAL, BILATERAL SALPINGO-OOPHORECTOMY, COMBINED      Endometriosis.     IR RENAL STONE REMOVAL RIGHT  11/3/2020     LASER HOLMIUM NEPHROLITHOTOMY VIA PERCUTANEOUS NEPHROSTOMY Right 11/3/2020    Procedure: Laser holmium nephrolithotomy via percutaneous nephrostomy, stent insertion;  Surgeon: Chip Clakr MD;  Location:  OR     LITHOTRIPSY       PERCUTANEOUS NEPHROLITHOTOMY Right 11/3/2020    Procedure: Right percutaneous nephrolithotomy, Holmium laser lithotripsy, stent placement;  Surgeon: Chip Clark MD;  Location:  OR      Allergies   Allergen Reactions     Cefprozil Hives     Cefzil     Penicillin V Rash     As a young adult        Social History     Tobacco Use     Smoking status: Former Smoker     Quit date: 1979     Years since  quittin.3     Smokeless tobacco: Never Used   Substance Use Topics     Alcohol use: Not Currently     Alcohol/week: 0.0 standard drinks      Wt Readings from Last 1 Encounters:   21 69.4 kg (153 lb)        Anesthesia Evaluation   Pt has had prior anesthetic. Type: General.    History of anesthetic complications  - PONV.      ROS/MED HX  ENT/Pulmonary:  - neg pulmonary ROS     Neurologic: Comment: BPPV      Cardiovascular: Comment: PRN metoprolol    (+) -----dysrhythmias,     METS/Exercise Tolerance:     Hematologic:       Musculoskeletal:       GI/Hepatic:  - neg GI/hepatic ROS     Renal/Genitourinary:     (+) Nephrolithiasis ,     Endo:    (-) Type II DM   Psychiatric/Substance Use:     (+) psychiatric history anxiety     Infectious Disease:       Malignancy:       Other:            Physical Exam    Airway        Mallampati: II   TM distance: > 3 FB   Neck ROM: full   Mouth opening: > 3 cm    Respiratory Devices and Support         Dental  no notable dental history         Cardiovascular             Pulmonary                   OUTSIDE LABS:  CBC:   Lab Results   Component Value Date    WBC 12.3 (H) 2020    WBC 7.8 2020    HGB 14.3 2021    HGB 13.7 2020    HCT 41.6 2020    HCT 41.8 2020     2020     2020     BMP:   Lab Results   Component Value Date     2020     2020    POTASSIUM 3.8 2021    POTASSIUM 3.6 2020    CHLORIDE 108 2020    CHLORIDE 109 2020    CO2 26 2020    CO2 28 2020    BUN 13 2020    BUN 12 2020    CR 0.85 2020    CR 0.75 2020     (H) 2020    GLC 95 2020     COAGS: No results found for: PTT, INR, FIBR  POC: No results found for: BGM, HCG, HCGS  HEPATIC:   Lab Results   Component Value Date    ALBUMIN 3.9 2019    PROTTOTAL 7.3 2019    ALT 27 2020    AST 26 2019    ALKPHOS 67 2019    BILITOTAL 0.4  11/06/2019     OTHER:   Lab Results   Component Value Date    PRINCE 9.1 11/04/2020    MAG 2.4 (H) 07/22/2019    TSH 0.95 07/12/2019       Anesthesia Plan    ASA Status:  2      Anesthesia Type: General.     - Airway: LMA   Induction: Intravenous, Propofol.   Maintenance: TIVA.        Consents    Anesthesia Plan(s) and associated risks, benefits, and realistic alternatives discussed. Questions answered and patient/representative(s) expressed understanding.     - Discussed with:  Patient         Postoperative Care    Pain management: IV analgesics, Oral pain medications.   PONV prophylaxis: Ondansetron (or other 5HT-3), Dexamethasone or Solumedrol, Background Propofol Infusion, Aprepitant     Comments:    Patient reports being very sensitive to medications.  See previous record.            Leonel Pizarro MD

## 2021-05-13 NOTE — ANESTHESIA POSTPROCEDURE EVALUATION
Patient: Carola Cox    Procedure(s):  BILATERAL EXTRACORPOREAL SHOCK WAVE LITHOTRIPSY(ESWL)  CYSTOSCOPY, PLACEMENT LEFT URETERAL STENT INSERTION    Diagnosis:Kidney stone [N20.0]  Diagnosis Additional Information: No value filed.    Anesthesia Type:  General    Note:  Disposition: Outpatient   Postop Pain Control: Uneventful            Sign Out: Well controlled pain   PONV:    Neuro/Psych: Uneventful            Sign Out: Acceptable/Baseline neuro status   Airway/Respiratory: Uneventful            Sign Out: Acceptable/Baseline resp. status   CV/Hemodynamics: Uneventful            Sign Out: Acceptable CV status   Other NRE: NONE   DID A NON-ROUTINE EVENT OCCUR? No           Last vitals:  Vitals:    05/13/21 1615 05/13/21 1630 05/13/21 1645   BP: (!) 148/74 (!) 145/95 (!) 164/85   Pulse: 75 72 66   Resp: 12 18 11   Temp:   36.1  C (96.9  F)   SpO2: 98% 99% 100%       Last vitals prior to Anesthesia Care Transfer:  CRNA VITALS  5/13/2021 1525 - 5/13/2021 1625      5/13/2021             Resp Rate (observed):  18          Electronically Signed By: Meek Knox MD  May 13, 2021  4:48 PM

## 2021-05-13 NOTE — ANESTHESIA CARE TRANSFER NOTE
Patient: Carola Cox    Procedure(s):  BILATERAL EXTRACORPOREAL SHOCK WAVE LITHOTRIPSY(ESWL)  CYSTOSCOPY, PLACEMENT LEFT URETERAL STENT INSERTION    Diagnosis: Kidney stone [N20.0]  Diagnosis Additional Information: No value filed.    Anesthesia Type:   General     Note:    Oropharynx: oropharynx clear of all foreign objects and spontaneously breathing  Level of Consciousness: awake  Oxygen Supplementation: face mask  Level of Supplemental Oxygen (L/min / FiO2): 6  Independent Airway: airway patency satisfactory and stable  Dentition: dentition unchanged  Vital Signs Stable: post-procedure vital signs reviewed and stable  Report to RN Given: handoff report given  Patient transferred to: PACU  Comments:   At end of procedure, spontaneous respirations, adequate tidal volumes, LMA removed atraumatically, airway patent after LMA removal. Oxygen via facemask at 6 liters per minute to PACU. Oxygen tubing connected to wall O2 in PACU, SpO2, NiBP, and EKG monitors and alarms on and functioning, Brenda Hugger warmer connected to patient gown, report on patient's clinical status given to PACU RN, RN questions answered.      Handoff Report: Identifed the Patient, Identified the Reponsible Provider, Reviewed the pertinent medical history, Discussed the surgical course, Reviewed Intra-OP anesthesia mangement and issues during anesthesia, Set expectations for post-procedure period and Allowed opportunity for questions and acknowledgement of understanding      Vitals: (Last set prior to Anesthesia Care Transfer)  CRNA VITALS  5/13/2021 1525 - 5/13/2021 1559      5/13/2021             NIBP:  133/76    Pulse:  78    NIBP Mean:  100    SpO2:  100 %    Resp Rate (observed):  11    Resp Rate (set):  10        Electronically Signed By: KENA Melendez CRNA  May 13, 2021  3:59 PM

## 2021-05-14 NOTE — OP NOTE
Procedure Date: 05/13/2021    SURGEON:  Chip Clark MD    PREOPERATIVE DIAGNOSIS:  Bilateral kidney stones.    POSTOPERATIVE DIAGNOSIS:  Bilateral kidney stones.    PROCEDURE PERFORMED:  Cystoscopy, placement of 5 x 24 double-J left ureteral stent, bilateral extracorporeal shock wave lithotripsy.    ANESTHESIA:  General endotracheal.    COMPLICATIONS:  None.    INDICATIONS FOR PROCEDURE:  Carola Cox is a 71-year-old woman with bilateral kidney stones, now presents for shockwave lithotripsy.  The stone in the left side a fairly large left recommended a stent placement on this side as well, details procedure, risks and benefits of the procedure were explained in detail to patient and informed consent was obtained.    DESCRIPTION OF PROCEDURE:  The patient was brought to the operating room and placed supine on the table where she underwent general endotracheal anesthetic.  She was then moved down to the dorsal lithotomy position, where she was prepped and draped in standard sterile fashion.    Procedure began by introducing the 22-Australian rigid cystoscope through the urethra into the bladder for cystoscopy.  There were no urothelial abnormalities identified.  I cannulated the left orifice with ureteral catheter and then passed a Glidewire into the left kidney.  I watched the Glidewire into the left kidney under fluoroscopic guidance.  I backed off the ureteral catheter.  I then passed a 5 x 24 double-J ureteral stent a wire.  The wire was pulled back and a good curl was seen in the renal pelvis under fluoroscopy.  A good curl was seen in the bladder under direct visualization.  The patient was then moved to the supine position over the lithotripter.  I then delivered 2400 shocks at a maximum power of 7 to the left sided stones and appeared to fragment completely.  I then repositioned the patient and delivered 2000 shocks at a maximum power of 6 to the right sided stones.  They appeared to fragment well.   Also, the patient was woken up and the procedure was concluded.    The patient tolerated the procedure without complications.  She went to post-anesthetic care unit in good condition.  She will go home from there.  I will see her back next week with a KUB and for stent removal.    Chip Clark MD        D: 2021   T: 2021   MT: JECMT    Name:     DESMOND SWENSON  MRN:      [id number]        Account:        [id number]   :      1950           Procedure Date: 2021     Document: [id number]

## 2021-05-20 ENCOUNTER — HOSPITAL ENCOUNTER (OUTPATIENT)
Dept: GENERAL RADIOLOGY | Facility: CLINIC | Age: 71
Discharge: HOME | End: 2021-05-20
Attending: UROLOGY | Admitting: UROLOGY
Payer: MEDICARE

## 2021-05-20 ENCOUNTER — OFFICE VISIT (OUTPATIENT)
Dept: UROLOGY | Facility: CLINIC | Age: 71
End: 2021-05-20
Payer: MEDICARE

## 2021-05-20 VITALS
SYSTOLIC BLOOD PRESSURE: 132 MMHG | WEIGHT: 147 LBS | OXYGEN SATURATION: 98 % | BODY MASS INDEX: 25.1 KG/M2 | HEART RATE: 92 BPM | HEIGHT: 64 IN | DIASTOLIC BLOOD PRESSURE: 82 MMHG

## 2021-05-20 DIAGNOSIS — N20.0 KIDNEY STONE: ICD-10-CM

## 2021-05-20 DIAGNOSIS — Z79.2 PROPHYLACTIC ANTIBIOTIC: Primary | ICD-10-CM

## 2021-05-20 PROCEDURE — 52310 CYSTOSCOPY AND TREATMENT: CPT | Mod: 58 | Performed by: UROLOGY

## 2021-05-20 PROCEDURE — 74019 RADEX ABDOMEN 2 VIEWS: CPT

## 2021-05-20 RX ORDER — LIDOCAINE HYDROCHLORIDE 20 MG/ML
JELLY TOPICAL ONCE
Status: COMPLETED | OUTPATIENT
Start: 2021-05-20 | End: 2021-05-20

## 2021-05-20 RX ORDER — CIPROFLOXACIN 500 MG/1
500 TABLET, FILM COATED ORAL ONCE
Qty: 1 TABLET | Refills: 0 | Status: SHIPPED | OUTPATIENT
Start: 2021-05-20 | End: 2021-05-20

## 2021-05-20 RX ADMIN — LIDOCAINE HYDROCHLORIDE 5 ML: 20 JELLY TOPICAL at 10:52

## 2021-05-20 ASSESSMENT — MIFFLIN-ST. JEOR: SCORE: 1156.79

## 2021-05-20 ASSESSMENT — PAIN SCALES - GENERAL: PAINLEVEL: NO PAIN (0)

## 2021-05-20 NOTE — PROGRESS NOTES
Office Visit Note  [facility]  [phone]    May 20, 2021    [unfilled]    1950    UROLOGIC DIAGNOSES:    History of bilateral kidney stones    CURRENT INTERVENTIONS:    S/P right PCNL, bilateral ESWL    History:    Corina underwent left-sided stent placement along with bilateral extracorporeal shockwave lithotripsy in the operating room last week.  She did well after the procedure.  She is here today for a KUB and stent removal.  She said that she felt fairly groggy from the anesthesia after ESWL but otherwise has felt well.  The stent has not been bothersome to her.      Imaging:    I reviewed her KUB images.  No stones identified in either side.  Stent in good position.  Labs:      MEDICATIONS:    Current Outpatient Medications:      albuterol (VENTOLIN HFA) 108 (90 Base) MCG/ACT inhaler, INHALE 2 PUFFS INTO THE LUNGS EVERY 6 HOURS PRN, Disp: 1 Inhaler, Rfl: 3     ciprofloxacin (CIPRO) 500 MG tablet, Take 1 tablet (500 mg) by mouth once for 1 dose, Disp: 1 tablet, Rfl: 0     famotidine (PEPCID) 20 MG tablet, Take 20 mg by mouth nightly as needed , Disp: , Rfl:      meclizine (ANTIVERT) 25 MG tablet, Take 1 tablet (25 mg) by mouth 3 times daily as needed for dizziness, Disp: 30 tablet, Rfl: 1     propranolol (INDERAL) 10 MG tablet, Take 5 mg by mouth 2 times daily as needed (1/2 x 10mg), Disp: , Rfl:      simvastatin (ZOCOR) 20 MG tablet, TAKE 1 TABLET BY MOUTH EVERYDAY AT BEDTIME - NEEDS LABS FOR REFILLS (Patient taking differently: Take 20 mg by mouth At Bedtime ), Disp: 90 tablet, Rfl: 2     triamcinolone (NASACORT) 55 MCG/ACT nasal aerosol, Spray 1 spray into both nostrils daily, Disp: , Rfl:      vitamin D3 (CHOLECALCIFEROL) 50 mcg (2000 units) tablet, Take 1 tablet by mouth daily, Disp: , Rfl:     ALLERGIES:     Allergies   Allergen Reactions     Cefprozil Hives     Cefzil     Penicillin V Rash     As a young adult         REVIEW OF SYSTEMS: Ten point review of systems without change as  "outlined in HPI    SURGICAL HISTORY:    Past Surgical History:   Procedure Laterality Date     COMBINED CYSTOSCOPY, INSERT STENT URETER(S) Right 11/3/2020    Procedure: Flexible cystoscopy with right sided ureteral catheter and stent placement;  Surgeon: Chip Clark MD;  Location:  OR     COMBINED CYSTOSCOPY, INSERT STENT URETER(S) Left 5/13/2021    Procedure: CYSTOSCOPY, PLACEMENT LEFT URETERAL STENT INSERTION;  Surgeon: Chip Clark MD;  Location:  OR     CYSTOSCOPY       EXTRACORPOREAL SHOCK WAVE LITHOTRIPSY, CYSTOSCOPY, INSERT STENT URETER(S), COMBINED Bilateral 5/13/2021    Procedure: BILATERAL EXTRACORPOREAL SHOCK WAVE LITHOTRIPSY(ESWL);  Surgeon: Chip Clark MD;  Location:  OR     HYSTERECTOMY TOTAL ABDOMINAL, BILATERAL SALPINGO-OOPHORECTOMY, COMBINED      Endometriosis.     IR RENAL STONE REMOVAL RIGHT  11/3/2020     LASER HOLMIUM NEPHROLITHOTOMY VIA PERCUTANEOUS NEPHROSTOMY Right 11/3/2020    Procedure: Laser holmium nephrolithotomy via percutaneous nephrostomy, stent insertion;  Surgeon: Chip Clark MD;  Location:  OR     LITHOTRIPSY       PERCUTANEOUS NEPHROLITHOTOMY Right 11/3/2020    Procedure: Right percutaneous nephrolithotomy, Holmium laser lithotripsy, stent placement;  Surgeon: Chip Clark MD;  Location:  OR         PHYSICAL EXAM:    /82 (BP Location: Right arm, Patient Position: Sitting, Cuff Size: Adult Regular)   Pulse 92   Ht 1.626 m (5' 4\")   Wt 66.7 kg (147 lb)   SpO2 98%   BMI 25.23 kg/m      Constitutional: Well developed. Conversant and in no acute distress  Eyes: Anicteric sclera, conjunctiva clear, normal extraocular movements  ENT: Normocephalic and atraumatic,   Skin: Warm and dry. No rashes or lesions  Cardiac: No peripheral edema  Back/Flank: Not done  CNS/PNS: Normal musculature and movements, moves all extremities normally  Respiratory: Normal non-labored breathing  Abdomen: Soft nontender and " nondistended  Peripheral Vascular: No peripheral edema  Mental Status/Psych: Alert and Oriented x 3. Normal mood and affect      External Genitalia: Not done  Bladder: Not done  Urethra: Not done   Vagina: Not done    Cystoscopy: I performed flexible cystoscopy and the stent was removed without difficulty      Urinalysis:  UA RESULTS:  Recent Labs   Lab Test 08/04/20 2047 01/06/14  1423   COLOR Light Yellow Yellow   APPEARANCE Clear Clear   URINEGLC Negative Negative   URINEBILI Negative Negative   URINEKETONE Negative Negative   SG 1.014 1.005   UBLD Moderate* Negative   URINEPH 5.5 6.5   PROTEIN 20* Negative   UROBILINOGEN  --  0.2   NITRITE Negative Negative   LEUKEST Moderate* Negative   RBCU 35*  --    WBCU 15*  --          IMPRESSION:    Doing well, stent removed    PLAN:    We discussed prevention methods for future stones.  I recommended that in 1 year we will see her back with a KUB to make certain she is not forming any new stones.    Chip Clark M.D.

## 2021-05-20 NOTE — PATIENT INSTRUCTIONS

## 2021-05-20 NOTE — LETTER
5/20/2021       RE: Carola Cox  [address] [address] [facility] [address]     Dear Colleague,    Thank you for referring your patient, Carola Cox, to the [facility] at [facility]. Please see a copy of my visit note below.    Office Visit Note  [facility]  [phone]    May 20, 2021    [unfilled]    1950    UROLOGIC DIAGNOSES:    History of bilateral kidney stones    CURRENT INTERVENTIONS:    S/P right PCNL, bilateral ESWL    History:    Corina underwent left-sided stent placement along with bilateral extracorporeal shockwave lithotripsy in the operating room last week.  She did well after the procedure.  She is here today for a KUB and stent removal.  She said that she felt fairly groggy from the anesthesia after ESWL but otherwise has felt well.  The stent has not been bothersome to her.      Imaging:    I reviewed her KUB images.  No stones identified in either side.  Stent in good position.  Labs:      MEDICATIONS:    Current Outpatient Medications:      albuterol (VENTOLIN HFA) 108 (90 Base) MCG/ACT inhaler, INHALE 2 PUFFS INTO THE LUNGS EVERY 6 HOURS PRN, Disp: 1 Inhaler, Rfl: 3     ciprofloxacin (CIPRO) 500 MG tablet, Take 1 tablet (500 mg) by mouth once for 1 dose, Disp: 1 tablet, Rfl: 0     famotidine (PEPCID) 20 MG tablet, Take 20 mg by mouth nightly as needed , Disp: , Rfl:      meclizine (ANTIVERT) 25 MG tablet, Take 1 tablet (25 mg) by mouth 3 times daily as needed for dizziness, Disp: 30 tablet, Rfl: 1     propranolol (INDERAL) 10 MG tablet, Take 5 mg by mouth 2 times daily as needed (1/2 x 10mg), Disp: , Rfl:      simvastatin (ZOCOR) 20 MG tablet, TAKE 1 TABLET BY MOUTH EVERYDAY AT BEDTIME - NEEDS LABS FOR REFILLS (Patient taking differently: Take 20 mg by mouth At Bedtime ), Disp: 90 tablet, Rfl: 2     triamcinolone (NASACORT) 55 MCG/ACT nasal aerosol, Spray 1 spray into both nostrils  "daily, Disp: , Rfl:      vitamin D3 (CHOLECALCIFEROL) 50 mcg (2000 units) tablet, Take 1 tablet by mouth daily, Disp: , Rfl:     ALLERGIES:     Allergies   Allergen Reactions     Cefprozil Hives     Cefzil     Penicillin V Rash     As a young adult         REVIEW OF SYSTEMS: Ten point review of systems without change as outlined in HPI    SURGICAL HISTORY:    Past Surgical History:   Procedure Laterality Date     COMBINED CYSTOSCOPY, INSERT STENT URETER(S) Right 11/3/2020    Procedure: Flexible cystoscopy with right sided ureteral catheter and stent placement;  Surgeon: Chip Clark MD;  Location:  OR     COMBINED CYSTOSCOPY, INSERT STENT URETER(S) Left 5/13/2021    Procedure: CYSTOSCOPY, PLACEMENT LEFT URETERAL STENT INSERTION;  Surgeon: Chip Clark MD;  Location:  OR     CYSTOSCOPY       EXTRACORPOREAL SHOCK WAVE LITHOTRIPSY, CYSTOSCOPY, INSERT STENT URETER(S), COMBINED Bilateral 5/13/2021    Procedure: BILATERAL EXTRACORPOREAL SHOCK WAVE LITHOTRIPSY(ESWL);  Surgeon: Chip Clark MD;  Location:  OR     HYSTERECTOMY TOTAL ABDOMINAL, BILATERAL SALPINGO-OOPHORECTOMY, COMBINED      Endometriosis.     IR RENAL STONE REMOVAL RIGHT  11/3/2020     LASER HOLMIUM NEPHROLITHOTOMY VIA PERCUTANEOUS NEPHROSTOMY Right 11/3/2020    Procedure: Laser holmium nephrolithotomy via percutaneous nephrostomy, stent insertion;  Surgeon: Chip Clark MD;  Location:  OR     LITHOTRIPSY       PERCUTANEOUS NEPHROLITHOTOMY Right 11/3/2020    Procedure: Right percutaneous nephrolithotomy, Holmium laser lithotripsy, stent placement;  Surgeon: Chip Clark MD;  Location:  OR         PHYSICAL EXAM:    /82 (BP Location: Right arm, Patient Position: Sitting, Cuff Size: Adult Regular)   Pulse 92   Ht 1.626 m (5' 4\")   Wt 66.7 kg (147 lb)   SpO2 98%   BMI 25.23 kg/m      Constitutional: Well developed. Conversant and in no acute distress  Eyes: Anicteric sclera, conjunctiva " clear, normal extraocular movements  ENT: Normocephalic and atraumatic,   Skin: Warm and dry. No rashes or lesions  Cardiac: No peripheral edema  Back/Flank: Not done  CNS/PNS: Normal musculature and movements, moves all extremities normally  Respiratory: Normal non-labored breathing  Abdomen: Soft nontender and nondistended  Peripheral Vascular: No peripheral edema  Mental Status/Psych: Alert and Oriented x 3. Normal mood and affect      External Genitalia: Not done  Bladder: Not done  Urethra: Not done   Vagina: Not done    Cystoscopy: I performed flexible cystoscopy and the stent was removed without difficulty      Urinalysis:  UA RESULTS:  Recent Labs   Lab Test 08/04/20 2047 01/06/14  1423   COLOR Light Yellow Yellow   APPEARANCE Clear Clear   URINEGLC Negative Negative   URINEBILI Negative Negative   URINEKETONE Negative Negative   SG 1.014 1.005   UBLD Moderate* Negative   URINEPH 5.5 6.5   PROTEIN 20* Negative   UROBILINOGEN  --  0.2   NITRITE Negative Negative   LEUKEST Moderate* Negative   RBCU 35*  --    WBCU 15*  --          IMPRESSION:    Doing well, stent removed    PLAN:    We discussed prevention methods for future stones.  I recommended that in 1 year we will see her back with a KUB to make certain she is not forming any new stones.    Chip Clark M.D.

## 2021-05-20 NOTE — NURSING NOTE
Chief Complaint   Patient presents with     kidney stone     in office stent removal today   Prior to the start of the procedure and with procedural staff participation, I verbally confirmed the patient s identity using two indicators, relevant allergies, that the procedure was appropriate and matched the consent or emergent situation, and that the correct equipment/implants were available. Immediately prior to starting the procedure I conducted the Time Out with the procedural staff and re-confirmed the patient s name, procedure, and site/side. I have wiped the patient off with the povidone-Iodine solution,used lidocaine jelly, draped them,  and instilled sterile water into the bladder. (The Joint Commission universal protocol was followed.)  Yes    Sedation (Moderate or Deep): None  Laura Ventura LPN

## 2021-06-04 NOTE — ADDENDUM NOTE
Addendum  created 06/04/21 1447 by Abilio Sharp MD    Attestation recorded in Intraprocedure, Intraprocedure Attestations deleted, Intraprocedure Attestations filed

## 2021-06-18 ENCOUNTER — TELEPHONE (OUTPATIENT)
Dept: PHARMACY | Facility: CLINIC | Age: 71
End: 2021-06-18

## 2021-06-18 NOTE — TELEPHONE ENCOUNTER
Telephone Encounter:  [facility] Study Participant      Reason for Call:   Initial [facility] phone call    Blood Pressure Medications:  >     >Propranolol 5 mg BID PRN    Not taking - was on for anxiety. Patient states that she has this at home but does not remember when this was last used.  > Last contact with patient:      New patient     Changes in Diet:    Tries to eat healthy  Has been careful in eating foods that do not antagonize kidney stones  Low sodium  Low oxalate   Low sugar diet     Drinks lots of water, 100 oz a day.     When cooking at home she adheres to this diet.  Now, post covid she is eating out more.       Changes in Activity:    Walks -on a daily basis.  Stays active, but does not have a specific exercise routine.   Medication Adherence:  Good   Changes in weight:  Not discussed   Health Goals:         Does not handle medications well.  Has many side effects to medications. Side effects may be exaggerated when they happen.     Current Outpatient Medications   Medication Sig Dispense Refill     albuterol (VENTOLIN HFA) 108 (90 Base) MCG/ACT inhaler INHALE 2 PUFFS INTO THE LUNGS EVERY 6 HOURS PRN 1 Inhaler 3     famotidine (PEPCID) 20 MG tablet Take 20 mg by mouth nightly as needed        meclizine (ANTIVERT) 25 MG tablet Take 1 tablet (25 mg) by mouth 3 times daily as needed for dizziness 30 tablet 1     propranolol (INDERAL) 10 MG tablet Take 5 mg by mouth 2 times daily as needed (1/2 x 10mg)       simvastatin (ZOCOR) 20 MG tablet TAKE 1 TABLET BY MOUTH EVERYDAY AT BEDTIME - NEEDS LABS FOR REFILLS (Patient taking differently: Take 20 mg by mouth At Bedtime ) 90 tablet 2     triamcinolone (NASACORT) 55 MCG/ACT nasal aerosol Spray 1 spray into both nostrils daily       vitamin D3 (CHOLECALCIFEROL) 50 mcg (2000 units) tablet Take 1 tablet by mouth daily            Home Blood Preassure Readings:        Sys D HR Date Time  107 71 75 Jun 18, 2021, 07:53am  134 74 68 Jun 17, 2021, 07:00am  124 74 70 Jun 16,  2021, 06:14am  136 76 72 Jun 16, 2021, 06:09am  117 75 74 Yan 15, 2021, 06:13am  129 71 85 Jun 14, 2021, 07:34pm  139 80 83 Jun 14, 2021, 04:20pm  141 78 68 Jun 14, 2021, 06:36am  124 81 74 Jun 13, 2021, 08:19am  128 76 71 Jun 12, 2021, 08:15am  132 73 79 Jun 11, 2021, 07:34am  127 76 74 Yan 10, 2021, 06:50am  125 71 70 Jun 9, 2021, 06:45am  125 78 73 Jun 8, 2021, 07:28am  136 80 90 Jun 7, 2021, 10:13am  136 80 90 Jun 7, 2021, 10:13am  136 80 90 Jun 7, 2021, 10:10am  128 77 87 Jun 7, 2021, 10:03am  129 72 75 Jun 7, 2021, 07:47am  130 74 73 Jun 6, 2021, 07:47am  131 78 79 Jun 5, 2021, 08:27am  130 75 69 Jun 4, 2021, 06:28am  133 76 66 Yan 3, 2021, 07:30am  133 75 71 Jun 2, 2021, 06:35am  125 77 69 Jun 1, 2021, 08:01am  126 77 76 May 31, 2021, 07:40am  129 74 67 May 30, 2021, 08:30am  135 59 63 May 29, 2021, 07:30am  133 70 70 May 28, 2021, 07:15am       Assessment & Plan   Impression:  Blood pressures are meeting goal. In the past the patient has had elevated blood pressures, but feels that this was related to her medical condition at the time. Currently she adheres to a strict diet that reduces her sodium. She attributes her improved blood pressure control to these lifestyle changes.  Currently taking no medication for hypertension.         Medication Changes:  No changes made today       Follow-Up Plan:    Phone follow-up in 3 to 4 weeks.            Jacques Kaminski, Pharm.D.    Note forwarded to primary care provider.

## 2021-07-09 ENCOUNTER — TELEPHONE (OUTPATIENT)
Dept: PHARMACY | Facility: CLINIC | Age: 71
End: 2021-07-09

## 2021-07-09 NOTE — TELEPHONE ENCOUNTER
Telephone Encounter:  [facility] Study Participant      Reason for Call: called 1x left VM      Blood Pressure Medications:  >   none  >  > Has not use beta-blocker in the past month. Last contact with patient:    6/18/21       Changes in Diet:  Diet has an increased amount of Na in the past week because of vacation      Changes in Activity :   active on vacation    Medication Adherence:  good   Changes in weight:   not discussed    Health Goals:              Current Outpatient Medications   Medication Sig Dispense Refill     albuterol (VENTOLIN HFA) 108 (90 Base) MCG/ACT inhaler INHALE 2 PUFFS INTO THE LUNGS EVERY 6 HOURS PRN 1 Inhaler 3     famotidine (PEPCID) 20 MG tablet Take 20 mg by mouth nightly as needed        meclizine (ANTIVERT) 25 MG tablet Take 1 tablet (25 mg) by mouth 3 times daily as needed for dizziness 30 tablet 1     propranolol (INDERAL) 10 MG tablet Take 5 mg by mouth 2 times daily as needed (1/2 x 10mg)       simvastatin (ZOCOR) 20 MG tablet TAKE 1 TABLET BY MOUTH EVERYDAY AT BEDTIME - NEEDS LABS FOR REFILLS (Patient taking differently: Take 20 mg by mouth At Bedtime ) 90 tablet 2     triamcinolone (NASACORT) 55 MCG/ACT nasal aerosol Spray 1 spray into both nostrils daily            Home Blood Preassure Readings:          Sys Beatriz HR Date Time  136 74 73 Jul 6, 2021, 07:16am  141 74 80 Jul 6, 2021, 07:14am  119 78 74 Jul 5, 2021, 06:42am  131 70 79 Jul 4, 2021, 07:21am  124 80 78 Jul 3, 2021, 06:48am  132 74 71 Jul 2, 2021, 07:02am  138 76 72 Jul 2, 2021, 06:58am  128 84 75 Jul 1, 2021, 07:06am  148 75 71 Jul 1, 2021, 07:02am  141 82 75 Jun 30, 2021, 09:38am  131 74 73 Jun 29, 2021, 06:37am  139 77 83 Jun 28, 2021, 08:12am  130 75 73 Jun 26, 2021, 06:41am  129 76 76 Jun 25, 2021, 08:34am  128 69 74 Jun 24, 2021, 06:48am  127 75 75 Jun 23, 2021, 06:23am  118 74 77 Jun 22, 2021, 08:12am  127 80 75 Jun 21, 2021, 07:08am  111 68 67 Jun 20, 2021, 07:21am  133 78 69 Jun 19, 2021, 05:57am  107 71 75 Yan  18, 2021, 07:53am  134 74 68 Jun 17, 2021, 07:00am  124 74 70 Jun 16, 2021, 06:14am  136 76 72 Jun 16, 2021, 06:09am  117 75 74 Yan 15, 2021, 06:13am  129 71 85 Jun 14, 2021, 07:34pm           Assessment & Plan   Impression:  Blood pressures continue to be in goal.  Systolic blood pressure averages less than 140 mmHg.  Patient continues not to take medication for blood pressure.  She knows so that having a blood pressure cuff with her on vacation has made her more aware of diet and how diet can influence her health.  As she eats foods with higher levels of salt, her blood pressure is also elevated.    Today we discussed the value of decreasing sodium intake, as well as maintaining appropriate hydration with water.       Medication Changes:    none         Follow-Up Plan:  4 weeks              Jacques Kaminski, Pharm.D.

## 2021-10-01 ENCOUNTER — TELEPHONE (OUTPATIENT)
Dept: PHARMACY | Facility: CLINIC | Age: 71
End: 2021-10-01
Payer: COMMERCIAL

## 2021-10-01 DIAGNOSIS — I10 BENIGN ESSENTIAL HYPERTENSION: Primary | ICD-10-CM

## 2021-10-01 RX ORDER — AMLODIPINE BESYLATE 2.5 MG/1
2.5 TABLET ORAL DAILY
Qty: 30 TABLET | Refills: 3 | Status: SHIPPED | OUTPATIENT
Start: 2021-10-01 | End: 2021-10-11 | Stop reason: SINTOL

## 2021-10-01 NOTE — TELEPHONE ENCOUNTER
Telephone Encounter:  MGlide Study Participant      Reason for Call: home blood pressure follow up      Blood Pressure Medications:  >     >propranolol PRN (not used )  > Last contact with patient:    1 week       Changes in Diet:    Patient has maintained a Low sodium diet she has been careful in hydration all in efforts to avoid future kidney stones.  Patient also avoids oxalates in her diet.   Changes in Activity and Diet:  States that she remains active.  No changes in daily activity   Medication Adherence:  Good   Changes in weight:  Not discussed   Health Goals:           Patient is concerned that the blood pressure cuff using M glide study is not as accurate as her other blood pressure cuff.       Current Outpatient Medications   Medication Sig Dispense Refill     albuterol (VENTOLIN HFA) 108 (90 Base) MCG/ACT inhaler INHALE 2 PUFFS INTO THE LUNGS EVERY 6 HOURS PRN 1 Inhaler 3     famotidine (PEPCID) 20 MG tablet Take 20 mg by mouth nightly as needed        meclizine (ANTIVERT) 25 MG tablet Take 1 tablet (25 mg) by mouth 3 times daily as needed for dizziness 30 tablet 1     propranolol (INDERAL) 10 MG tablet Take 5 mg by mouth 2 times daily as needed (1/2 x 10mg)       simvastatin (ZOCOR) 20 MG tablet TAKE 1 TABLET BY MOUTH EVERYDAY AT BEDTIME - NEEDS LABS FOR REFILLS (Patient taking differently: Take 20 mg by mouth At Bedtime ) 90 tablet 2     triamcinolone (NASACORT) 55 MCG/ACT nasal aerosol Spray 1 spray into both nostrils daily            Home Blood Preassure Readings:          All readings  Sys Beatriz HR Date Time  152 83 77 Oct 1, 2021, 08:27am  143 80 80 Oct 1, 2021, 08:07am  147 78 82 Oct 1, 2021, 07:51am  147 78 77 Oct 1, 2021, 07:47am  145 77 78 Sep 30, 2021, 08:07am  143 84 82 Sep 30, 2021, 07:55am  140 85 78 Sep 28, 2021, 07:23am  137 80 78 Sep 27, 2021, 07:50am  144 77 78 Sep 27, 2021, 07:42am  133 81 77 Sep 25, 2021, 07:37am  140 81 77 Sep 25, 2021, 07:27am  132 78 75 Sep 24, 2021,  06:42am  139 80 78 Sep 23, 2021, 07:45am  133 81 74 Sep 22, 2021, 07:12am  136 78 75 Sep 21, 2021, 06:42am  146 79 72 Sep 21, 2021, 06:37am  131 77 74 Sep 20, 2021, 07:14am  139 80 71 Sep 19, 2021, 07:22am  135 81 76 Sep 18, 2021, 06:59am  136 80 76 Sep 17, 2021, 07:25am  126 79 73 Sep 16, 2021, 07:08am  141 78 77 Sep 16, 2021, 07:02am  135 76 74 Sep 15, 2021, 07:30am  135 80 81 Sep 14, 2021, 07:37am  141 77 75 Sep 13, 2021, 07:49am  141 76 74 Sep 13, 2021, 07:33am  145 78 71 Sep 13, 2021, 07:29am  129 76 72 Sep 12, 2021, 07:17am  131 84 71 Sep 11, 2021, 07:08am  133 74 73 Sep 10, 2021, 07:49am       Assessment & Plan   Impression:    Carola reports blood pressures increasing over the past few weeks.  She is begun to use her other blood pressure cuff and notes that the readings there are lower.  We spent time discussing her process to compare between blood pressure cuffs.  She has been alternating methods, and I agree that the two cuffs to be port different values.    We discussed the reasons for potentially rising blood pressure.  The patient agrees any be in anxiety associated with using this blood pressure cuff.  She also agrees that blood pressure has been on the higher side since the start of the study, but not only as tipped the line over to being uncontrolled hypertension.  She agrees to start a new medication, but requests to have a low dose and to avoid a drug with adverse effects.    As the patient has a history of kidney stones I have elected to start a drug that does not affect kidney function.  Will initiate a low-dose amlodipine.  I spent time describing adverse effects related to this medication.  I also aware that this medication has a potential drug interaction with the patient's simvastatin.  The drug reaction is clinically significant when the simvastatin dose is greater than 20 mg, which he does not.           Medication Changes:    Begin amlodipine 2.5 mg once daily          Follow-Up Plan:  2  weeks              Note forwarded to Dr. Vikram Kaminski, Pharm.D.

## 2021-10-11 ENCOUNTER — OFFICE VISIT (OUTPATIENT)
Dept: PHARMACY | Facility: CLINIC | Age: 71
End: 2021-10-11
Payer: COMMERCIAL

## 2021-10-11 VITALS — WEIGHT: 154 LBS | DIASTOLIC BLOOD PRESSURE: 86 MMHG | SYSTOLIC BLOOD PRESSURE: 130 MMHG | BODY MASS INDEX: 26.43 KG/M2

## 2021-10-11 DIAGNOSIS — K21.00 GASTROESOPHAGEAL REFLUX DISEASE WITH ESOPHAGITIS WITHOUT HEMORRHAGE: ICD-10-CM

## 2021-10-11 DIAGNOSIS — I49.8 OTHER CARDIAC ARRHYTHMIA: ICD-10-CM

## 2021-10-11 DIAGNOSIS — F41.1 GENERALIZED ANXIETY DISORDER: ICD-10-CM

## 2021-10-11 DIAGNOSIS — E78.5 HYPERLIPIDEMIA, UNSPECIFIED HYPERLIPIDEMIA TYPE: ICD-10-CM

## 2021-10-11 DIAGNOSIS — Z87.898 H/O WHEEZING: Primary | ICD-10-CM

## 2021-10-11 DIAGNOSIS — Z71.85 VACCINE COUNSELING: ICD-10-CM

## 2021-10-11 DIAGNOSIS — H81.13 BENIGN PAROXYSMAL POSITIONAL VERTIGO DUE TO BILATERAL VESTIBULAR DISORDER: ICD-10-CM

## 2021-10-11 DIAGNOSIS — N20.0 KIDNEY STONES: ICD-10-CM

## 2021-10-11 DIAGNOSIS — I10 BENIGN ESSENTIAL HYPERTENSION: ICD-10-CM

## 2021-10-11 PROCEDURE — 99605 MTMS BY PHARM NP 15 MIN: CPT | Performed by: PHARMACIST

## 2021-10-11 PROCEDURE — 99607 MTMS BY PHARM ADDL 15 MIN: CPT | Performed by: PHARMACIST

## 2021-10-11 RX ORDER — ALBUTEROL SULFATE 90 UG/1
2 AEROSOL, METERED RESPIRATORY (INHALATION) EVERY 6 HOURS PRN
Qty: 18 G | Refills: 1 | Status: SHIPPED | OUTPATIENT
Start: 2021-10-11 | End: 2021-10-11

## 2021-10-11 NOTE — PATIENT INSTRUCTIONS
Recommendations from today's MTM visit:                                                       1.  Use Albuterol as needed to help with 'tightness' - refill sent today  2.  You can try to use the famotidine as needed for heartburn  3.  Passion Flower supplement has some evidence to support use with anxiety - would start with low dose 250 mg daily.    Follow-up: Return in about 1 year (around 10/11/2022) for Medication Therapy Management.    It was great to speak with you today.  I value your experience and would be very thankful for your time with providing feedback on our clinic survey. You may receive a survey via email or text message in the next few days.     To schedule another MTM appointment, please call the clinic directly or you may call the MTM scheduling line at [phone] or toll-free at [phone].     My Clinical Pharmacist's contact information:                                                      Please feel free to contact me with any questions or concerns you have.      Celi Morris, Sharon Vidal Pharm D  [phone] (phone)  Medication Therapy Management Pharmacist

## 2021-10-11 NOTE — PROGRESS NOTES
Medication Therapy Management (MTM) Encounter    ASSESSMENT:                            Medication Adherence/Access: No issues identified    SVT/Hypertension: Stable. Patient has as needed propranolol that she is comfortable using if needed to control SVT.    Anxiety: Could be improved. Patient has tried different as needed medications and has experienced intolerable side effects to each. Patient is interested in natural products and would benefit from exploring passion flower to help with anxiety.  Some evidence to support the use of passion flower for anxiety and not concerns with cardiac palpitations.    Kidney Stone: Stable. Would benefit from continued follow up with urology.      Hyperlipidemia: Stable.    Positional Vertigo: Stable. Patient is doing well managing episodes with exercises that reposition crystals and has medication in case exercises do not work.    GERD: Since patient is now following a very healthy diet and is not having any heartburn symptoms could consider decreasing famotidine to as needed instead of daily.    Vaccines:  Per ACIP guidelines, patient is eligible for Shingrix vaccine.    Allergic Rhinitis: Could be improved. Patient is experiencing tightness in her lungs that could be improved by using her albuterol inhaler more often. Could also benefit from restarting Nasacort if needed for allergy symptoms.    PLAN:                            1.  Use Albuterol as needed to help with 'tightness'.  Refilled today.  2.  You can try to use the famotidine as needed for heartburn  3.  Passion Flower supplement has some evidence to support use with anxiety - would start with low dose 250 mg daily.    Follow-up: Return in about 1 year (around 10/11/2022) for Medication Therapy Management.      SUBJECTIVE/OBJECTIVE:                          Carola Cox is a 71 year old female coming in for a follow-up visit. She was referred to me from Leann Sue.  Today's visit is a follow-up  MTM visit from 9/10/20. First visit in 2021.    Reason for visit: medication review    Allergies/ADRs: Reviewed in chart  Tobacco: She reports that she quit smoking about 42 years ago. She has never used smokeless tobacco.  Alcohol: not currently using    Medication Adherence/Access: no issues reported    SVT/Hypertension: Stopped amlodipine after about 2 days due to side effects - palpitations.  Drinking Hybiscus tea to help with blood pressure.  Has been on beta-blockers in the past but had a hard time tolerating them; prefers to be off therapy which Cardiology is aware of.  She does have propranolol to take as needed.  Blood pressure checked daily at home - 'normal'.  Monitored through blood pressure study.    BP Readings from Last 3 Encounters:   10/11/21 130/86   05/20/21 132/82   05/13/21 (!) 164/85     Pulse Readings from Last 3 Encounters:   05/20/21 92   05/13/21 78   05/04/21 107     Anxiety: Taking propranolol 5mg twice daily as needed for symptoms - irregular heartbeat scares her.  Symptoms very erratic, could happen anytime.  Has tried hydroxyzine but that put her to sleep.    AIDA-7 SCORE 2/14/2020 3/26/2020 4/24/2020   Total Score 11 1 2       Kidney Stone:  Completed bilateral extracorporeal shock wave lithotripsy for bilateral kidney stones on 5/13/21.  Feels good now, no symptoms.  Following new diet restrictions - low sodium, calcium 1200 mg/day, low meat/protein, 100 oz of fluid per day, low sugar.  Has follow-up in May.      Hyperlipidemia: Current therapy includes simvastatin 20 mg once daily.    Pt reports no significant myalgias or other side effects.     Recent Labs   Lab Test 11/20/20  0915 11/06/19  1123 11/15/16  0948 10/28/15  0937   CHOL 201* 183   < > 152   HDL 60 60   < > 55   * 100*   < > 76   TRIG 106 117   < > 104   CHOLHDLRATIO  --   --   --  2.8    < > = values in this interval not displayed.     The 10-year ASCVD risk score (Mikki DIAZ Jr., et al., 2013) is: 18.1%    Values  used to calculate the score:      Age: 71 years      Sex: Female      Is Non- : No      Diabetic: No      Tobacco smoker: No      Systolic Blood Pressure: 132 mmHg      Is BP treated: Yes      HDL Cholesterol: 60 mg/dL      Total Cholesterol: 201 mg/dL     Positional Vertigo: Tried meclizine as needed.  Has seen therapist to learn exercises.  Still doing them which are helpful.  Will have symptoms randomly    GERD: Current medications include: Pepcid (famotidine) 20 mg every night before bed. Pt c/o no current symptoms.  Patient feels that current regimen is effective.    Vaccines:  Due for Shingrix.    Allergic Rhinitis: Current medications include albuterol as needed - feels like tight bronchial tubes that are related to allergies.  Also use the Advair as needed which was helpful.  Stopped Nasacort because she wasn't sure if affected her blood pressure.        Today's Vitals: /86   Wt 154 lb (69.9 kg)   BMI 26.43 kg/m    ----------------      I spent 30  minutes with this patient today. All changes were made via collaborative practice agreement with Leann Sue A copy of the visit note was provided to the patient's primary care provider.    The patient was sent via [employer] a summary of these recommendations.     Arely Vidal , Pharm D  [phone] (phone)  Medication Therapy Management Pharmacist     Celi Morris PharmD  Medication Therapy Management Resident  Pager: [phone]     Medication Therapy Recommendations  Gastroesophageal reflux disease with esophagitis without hemorrhage    Current Medication: famotidine (PEPCID) 20 MG tablet   Rationale: Frequency inappropriate - Dosage too high - Safety   Recommendation: Decrease Frequency   Status: Accepted - no CPA Needed   Note: Since patient is on a limited healthy diet and is not experiencing any heartburn symptoms, recommend decreasing to as needed instead of daily dosing.         Generalized anxiety  disorder    Rationale: Untreated condition - Needs additional medication therapy - Indication   Recommendation: Start Medication   Status: Accepted - no CPA Needed   Note: Recommended starting passion flower supplement as prescription medications have had intolerable side effects and patient is interested in a natural supplement.         H/O wheezing    Current Medication: albuterol (PROAIR HFA/PROVENTIL HFA/VENTOLIN HFA) 108 (90 Base) MCG/ACT inhaler   Rationale: Frequency inappropriate - Dosage too low - Effectiveness   Recommendation: Increase Frequency   Status: Patient Agreed - Adherence/Education   Note: Recommend taking 2 puffs up to 4 times daily as needed for wheezing/tightness as prescribed. Reminded patient that this medication should be taken 4 times daily if the wheezing and tightness are bothersome.

## 2021-10-25 ENCOUNTER — TELEPHONE (OUTPATIENT)
Dept: PHARMACY | Facility: CLINIC | Age: 71
End: 2021-10-25

## 2021-10-25 NOTE — TELEPHONE ENCOUNTER
Telephone Encounter:  MGlide Study Participant      Reason for Call: BP follow up      Blood Pressure Medications:  >     >propanolol PRN.  Not used  > Last contact with patient:           Changes in Diet:  Continues to monitor diet    Changes in Activity and Diet:   no change   Medication Adherence:  good   Changes in weight:       Health Goals:           Corina has discontinued amlodipine because of heart palpitations.  Symptoms resolved after stopping the medications       Current Outpatient Medications   Medication Sig Dispense Refill     albuterol (PROAIR HFA/PROVENTIL HFA/VENTOLIN HFA) 108 (90 Base) MCG/ACT inhaler INHALE 2 PUFFS INTO THE LUNGS EVERY 6 HOURS AS NEEDED FOR SHORTNESS OF BREATH OR DIFFICULT BREATHING OR WHEEZING 54 g 0     famotidine (PEPCID) 20 MG tablet Take 20 mg by mouth nightly as needed        meclizine (ANTIVERT) 25 MG tablet Take 1 tablet (25 mg) by mouth 3 times daily as needed for dizziness 30 tablet 1     propranolol (INDERAL) 10 MG tablet Take 5 mg by mouth 2 times daily as needed (1/2 x 10mg)       simvastatin (ZOCOR) 20 MG tablet TAKE 1 TABLET BY MOUTH EVERYDAY AT BEDTIME - NEEDS LABS FOR REFILLS (Patient taking differently: Take 20 mg by mouth At Bedtime ) 90 tablet 2          Home Blood Preassure Readings:          All readings  Sys Beatriz HR Date Time  127 81 83 Oct 25, 2021, 08:36am  124 71 77 Oct 24, 2021, 08:44am  131 80 84 Oct 23, 2021, 08:20am  126 76 76 Oct 22, 2021, 08:31am  135 74 74 Oct 21, 2021, 07:50am  129 78 73 Oct 20, 2021, 07:21am  123 74 77 Oct 19, 2021, 07:43am  133 73 73 Oct 18, 2021, 07:31am  134 72 76 Oct 17, 2021, 07:24am  115 78 78 Oct 16, 2021, 07:10am  127 75 77 Oct 15, 2021, 07:44am  132 74 75 Oct 14, 2021, 08:00am  131 75 78 Oct 13, 2021, 07:23am  138 76 78 Oct 12, 2021, 07:19am  138 74 74 Oct 11, 2021, 07:43am  120 72 72 Oct 10, 2021, 06:33am  134 76 82 Oct 9, 2021, 08:05am  138 77 76 Oct 8, 2021, 07:22am  114 76 83 Oct 7, 2021, 08:32am  118 83 82 Oct 6,  2021, 05:48am  134 78 73 Oct 5, 2021, 05:20am  138 75 76 Oct 4, 2021, 06:38am  133 76 77 Oct 3, 2021, 08:11am  145 76 82 Oct 2, 2021, 08:00am  147 76 84 Oct 2, 2021, 07:49am  152 83 77 Oct 1, 2021, 08:27am  143 80 80 Oct 1, 2021, 08:07am  147 78 82 Oct 1, 2021, 07:51am  147 78 77 Oct 1, 2021, 07:47am             Assessment & Plan   Impression:  Discontinued amlodipine due to adverse effects. Patient could only tolerate 2 days of that therapy.    Blood pressure is within goal.  No new changes made today.           Medication Changes:  No change             Follow-Up Plan:  3-4 weeks              Jacques Kaminski Pharm.D.  '

## 2021-11-04 ENCOUNTER — MYC MEDICAL ADVICE (OUTPATIENT)
Dept: INTERNAL MEDICINE | Facility: CLINIC | Age: 71
End: 2021-11-04
Payer: COMMERCIAL

## 2021-11-04 DIAGNOSIS — E78.2 MIXED HYPERLIPIDEMIA: ICD-10-CM

## 2021-11-05 DIAGNOSIS — E78.2 MIXED HYPERLIPIDEMIA: ICD-10-CM

## 2021-11-05 RX ORDER — SIMVASTATIN 20 MG
TABLET ORAL
Qty: 30 TABLET | Refills: 0 | Status: SHIPPED | OUTPATIENT
Start: 2021-11-05 | End: 2021-11-08

## 2021-11-05 NOTE — TELEPHONE ENCOUNTER
Annual Wellness scheduled. Patient is out of simvastatin and wondering if she can get a refill now that appointment scheduled?

## 2021-11-05 NOTE — TELEPHONE ENCOUNTER
I have seen this patient in May 2021 for a preop visit only, patient is due for Medicare wellness visit and f/u on chronic medical issues.   Please advise office visit appointment for physical and to discuss these issues. Last wellness visit 2017.

## 2021-11-08 RX ORDER — SIMVASTATIN 20 MG
TABLET ORAL
Qty: 90 TABLET | Refills: 0 | Status: SHIPPED | OUTPATIENT
Start: 2021-11-08 | End: 2022-02-03

## 2021-11-09 NOTE — TELEPHONE ENCOUNTER
Pending Prescriptions:                       Disp   Refills    simvastatin (ZOCOR) 20 MG tablet [Pharmac*90 tab*0            Sig: TAKE 1 TABLET BY MOUTH EVERY DAY AT BEDTIME    Prescription approved per [facility] Refill Protocol.    Next 5 appointments (look out 90 days)    Dec 07, 2021  9:00 AM  PHYSICAL with KENA Wilhelm CNP  [facility] [address] [facility] [place] [id number] [phone]

## 2021-11-18 ENCOUNTER — HOSPITAL ENCOUNTER (OUTPATIENT)
Dept: MAMMOGRAPHY | Facility: CLINIC | Age: 71
Discharge: HOME | End: 2021-11-18
Attending: INTERNAL MEDICINE | Admitting: INTERNAL MEDICINE
Payer: MEDICARE

## 2021-11-18 DIAGNOSIS — Z12.31 VISIT FOR SCREENING MAMMOGRAM: ICD-10-CM

## 2021-11-18 PROCEDURE — 77063 BREAST TOMOSYNTHESIS BI: CPT

## 2021-12-04 ASSESSMENT — ENCOUNTER SYMPTOMS
DYSURIA: 0
SORE THROAT: 0
WEAKNESS: 0
HEARTBURN: 0
FEVER: 0
DIARRHEA: 0
SHORTNESS OF BREATH: 0
FREQUENCY: 0
DIZZINESS: 0
HEMATOCHEZIA: 0
COUGH: 0
BREAST MASS: 0
HEMATURIA: 0
JOINT SWELLING: 1
EYE PAIN: 0
NERVOUS/ANXIOUS: 0
ARTHRALGIAS: 1
PALPITATIONS: 0
MYALGIAS: 0
ABDOMINAL PAIN: 0
CHILLS: 0
CONSTIPATION: 0
PARESTHESIAS: 0
HEADACHES: 0
NAUSEA: 0

## 2021-12-04 ASSESSMENT — ANXIETY QUESTIONNAIRES
2. NOT BEING ABLE TO STOP OR CONTROL WORRYING: SEVERAL DAYS
7. FEELING AFRAID AS IF SOMETHING AWFUL MIGHT HAPPEN: SEVERAL DAYS
8. IF YOU CHECKED OFF ANY PROBLEMS, HOW DIFFICULT HAVE THESE MADE IT FOR YOU TO DO YOUR WORK, TAKE CARE OF THINGS AT HOME, OR GET ALONG WITH OTHER PEOPLE?: NOT DIFFICULT AT ALL
5. BEING SO RESTLESS THAT IT IS HARD TO SIT STILL: NOT AT ALL
GAD7 TOTAL SCORE: 5
4. TROUBLE RELAXING: NOT AT ALL
3. WORRYING TOO MUCH ABOUT DIFFERENT THINGS: SEVERAL DAYS
GAD7 TOTAL SCORE: 5
7. FEELING AFRAID AS IF SOMETHING AWFUL MIGHT HAPPEN: SEVERAL DAYS
1. FEELING NERVOUS, ANXIOUS, OR ON EDGE: MORE THAN HALF THE DAYS
6. BECOMING EASILY ANNOYED OR IRRITABLE: NOT AT ALL
GAD7 TOTAL SCORE: 5

## 2021-12-04 ASSESSMENT — PATIENT HEALTH QUESTIONNAIRE - PHQ9
SUM OF ALL RESPONSES TO PHQ QUESTIONS 1-9: 0
SUM OF ALL RESPONSES TO PHQ QUESTIONS 1-9: 0

## 2021-12-04 ASSESSMENT — ACTIVITIES OF DAILY LIVING (ADL): CURRENT_FUNCTION: NO ASSISTANCE NEEDED

## 2021-12-05 ASSESSMENT — PATIENT HEALTH QUESTIONNAIRE - PHQ9: SUM OF ALL RESPONSES TO PHQ QUESTIONS 1-9: 0

## 2021-12-05 ASSESSMENT — ANXIETY QUESTIONNAIRES: GAD7 TOTAL SCORE: 5

## 2021-12-07 ENCOUNTER — OFFICE VISIT (OUTPATIENT)
Dept: INTERNAL MEDICINE | Facility: CLINIC | Age: 71
End: 2021-12-07
Payer: MEDICARE

## 2021-12-07 VITALS
WEIGHT: 154 LBS | BODY MASS INDEX: 26.29 KG/M2 | HEIGHT: 64 IN | SYSTOLIC BLOOD PRESSURE: 134 MMHG | OXYGEN SATURATION: 99 % | RESPIRATION RATE: 16 BRPM | TEMPERATURE: 98.3 F | DIASTOLIC BLOOD PRESSURE: 84 MMHG | HEART RATE: 97 BPM

## 2021-12-07 DIAGNOSIS — Z00.00 ROUTINE GENERAL MEDICAL EXAMINATION AT A HEALTH CARE FACILITY: Primary | ICD-10-CM

## 2021-12-07 DIAGNOSIS — E78.2 MIXED HYPERLIPIDEMIA: ICD-10-CM

## 2021-12-07 DIAGNOSIS — F41.1 GENERALIZED ANXIETY DISORDER: ICD-10-CM

## 2021-12-07 DIAGNOSIS — M81.0 OSTEOPOROSIS, UNSPECIFIED OSTEOPOROSIS TYPE, UNSPECIFIED PATHOLOGICAL FRACTURE PRESENCE: ICD-10-CM

## 2021-12-07 DIAGNOSIS — E55.9 VITAMIN D DEFICIENCY: ICD-10-CM

## 2021-12-07 DIAGNOSIS — L65.9 HAIR LOSS: ICD-10-CM

## 2021-12-07 DIAGNOSIS — H81.13 BENIGN PAROXYSMAL POSITIONAL VERTIGO DUE TO BILATERAL VESTIBULAR DISORDER: ICD-10-CM

## 2021-12-07 DIAGNOSIS — Z11.59 NEED FOR HEPATITIS C SCREENING TEST: ICD-10-CM

## 2021-12-07 DIAGNOSIS — Z12.11 SPECIAL SCREENING FOR MALIGNANT NEOPLASMS, COLON: ICD-10-CM

## 2021-12-07 LAB
BASOPHILS # BLD AUTO: 0 10E3/UL (ref 0–0.2)
BASOPHILS NFR BLD AUTO: 0 %
EOSINOPHIL # BLD AUTO: 0.1 10E3/UL (ref 0–0.7)
EOSINOPHIL NFR BLD AUTO: 1 %
ERYTHROCYTE [DISTWIDTH] IN BLOOD BY AUTOMATED COUNT: 13.6 % (ref 10–15)
HCT VFR BLD AUTO: 47.2 % (ref 35–47)
HGB BLD-MCNC: 14.9 G/DL (ref 11.7–15.7)
LYMPHOCYTES # BLD AUTO: 1.4 10E3/UL (ref 0.8–5.3)
LYMPHOCYTES NFR BLD AUTO: 17 %
MCH RBC QN AUTO: 29.3 PG (ref 26.5–33)
MCHC RBC AUTO-ENTMCNC: 31.6 G/DL (ref 31.5–36.5)
MCV RBC AUTO: 93 FL (ref 78–100)
MONOCYTES # BLD AUTO: 0.5 10E3/UL (ref 0–1.3)
MONOCYTES NFR BLD AUTO: 6 %
NEUTROPHILS # BLD AUTO: 6.1 10E3/UL (ref 1.6–8.3)
NEUTROPHILS NFR BLD AUTO: 75 %
PLATELET # BLD AUTO: 240 10E3/UL (ref 150–450)
RBC # BLD AUTO: 5.08 10E6/UL (ref 3.8–5.2)
WBC # BLD AUTO: 8.1 10E3/UL (ref 4–11)

## 2021-12-07 PROCEDURE — 84443 ASSAY THYROID STIM HORMONE: CPT | Performed by: NURSE PRACTITIONER

## 2021-12-07 PROCEDURE — G0438 PPPS, INITIAL VISIT: HCPCS | Performed by: NURSE PRACTITIONER

## 2021-12-07 PROCEDURE — 80061 LIPID PANEL: CPT | Performed by: NURSE PRACTITIONER

## 2021-12-07 PROCEDURE — 36415 COLL VENOUS BLD VENIPUNCTURE: CPT | Performed by: NURSE PRACTITIONER

## 2021-12-07 PROCEDURE — 85025 COMPLETE CBC W/AUTO DIFF WBC: CPT | Performed by: NURSE PRACTITIONER

## 2021-12-07 PROCEDURE — 82306 VITAMIN D 25 HYDROXY: CPT | Performed by: NURSE PRACTITIONER

## 2021-12-07 PROCEDURE — 80053 COMPREHEN METABOLIC PANEL: CPT | Performed by: NURSE PRACTITIONER

## 2021-12-07 PROCEDURE — 86803 HEPATITIS C AB TEST: CPT | Performed by: NURSE PRACTITIONER

## 2021-12-07 RX ORDER — PROPRANOLOL HYDROCHLORIDE 10 MG/1
5-10 TABLET ORAL 2 TIMES DAILY PRN
Qty: 60 TABLET | Refills: 4 | Status: SHIPPED | OUTPATIENT
Start: 2021-12-07 | End: 2021-12-08

## 2021-12-07 RX ORDER — BUSPIRONE HYDROCHLORIDE 5 MG/1
2.5 TABLET ORAL 2 TIMES DAILY
Qty: 30 TABLET | Refills: 1 | Status: SHIPPED | OUTPATIENT
Start: 2021-12-07 | End: 2022-05-25

## 2021-12-07 ASSESSMENT — ENCOUNTER SYMPTOMS
HEARTBURN: 0
DYSURIA: 0
SORE THROAT: 0
CONSTIPATION: 0
HEMATURIA: 0
DIARRHEA: 0
BREAST MASS: 0
NERVOUS/ANXIOUS: 0
PALPITATIONS: 0
NAUSEA: 0
ABDOMINAL PAIN: 0
EYE PAIN: 0
DIZZINESS: 0
HEADACHES: 0
WEAKNESS: 0
MYALGIAS: 0
FEVER: 0
FREQUENCY: 0
ARTHRALGIAS: 1
JOINT SWELLING: 1
HEMATOCHEZIA: 0
PARESTHESIAS: 0
SHORTNESS OF BREATH: 0
COUGH: 0
CHILLS: 0

## 2021-12-07 ASSESSMENT — ACTIVITIES OF DAILY LIVING (ADL): CURRENT_FUNCTION: NO ASSISTANCE NEEDED

## 2021-12-07 ASSESSMENT — MIFFLIN-ST. JEOR: SCORE: 1188.54

## 2021-12-07 NOTE — PROGRESS NOTES
"SUBJECTIVE:   Carola Cox is a 71 year old female who presents for Preventive Visit.      Patient has been advised of split billing requirements and indicates understanding:    Are you in the first 12 months of your Medicare coverage?  No    Healthy Habits:     In general, how would you rate your overall health?  Good    Frequency of exercise:  4-5 days/week    Duration of exercise:  30-45 minutes    Do you usually eat at least 4 servings of fruit and vegetables a day, include whole grains    & fiber and avoid regularly eating high fat or \"junk\" foods?  Yes    Taking medications regularly:  Yes    Ability to successfully perform activities of daily living:  No assistance needed    Home Safety:  No safety concerns identified    Hearing Impairment:  No hearing concerns    In the past 6 months, have you been bothered by leaking of urine? Yes    In general, how would you rate your overall mental or emotional health?  Good      PHQ-2 Total Score: 0    Additional concerns today:  No    Do you feel safe in your environment? Yes    Have you ever done Advance Care Planning? (For example, a Health Directive, POLST, or a discussion with a medical provider or your loved ones about your wishes): Yes, patient states has an Advance Care Planning document and will bring a copy to the clinic.       Fall risk  Fallen 2 or more times in the past year?: No  Any fall with injury in the past year?: No    Cognitive Screening   1) Repeat 3 items (Leader, Season, Table)    2) Clock draw: NORMAL  3) 3 item recall: Recalls 3 objects  Results: 3 items recalled: COGNITIVE IMPAIRMENT LESS LIKELY    Mini-CogTM Copyright STEPHANIE Ramos. Licensed by the author for use in [facility]; reprinted with permission ([email or web address]@.[email or web address]). All rights reserved.      Do you have sleep apnea, excessive snoring or daytime drowsiness?: no    Reviewed and updated as needed this visit by clinical staff  Tobacco  Allergies  Meds  Problems  Med Hx  Surg " Hx  Fam Hx  Soc Hx         Reviewed and updated as needed this visit by Provider   Allergies             Social History     Tobacco Use     Smoking status: Former Smoker     Quit date: 1979     Years since quittin.9     Smokeless tobacco: Never Used   Substance Use Topics     Alcohol use: Not Currently     Alcohol/week: 0.0 standard drinks         Alcohol Use 2021   Prescreen: >3 drinks/day or >7 drinks/week? Not Applicable   Prescreen: >3 drinks/day or >7 drinks/week? -               Current providers sharing in care for this patient include:   Patient Care Team:  Leann Sue MD as PCP - General (Internal Medicine)  Arely Vidal, [facility] as Pharmacist (Pharmacist)  Chip Clark MD as Assigned Surgical Provider  Leann Sue MD as Assigned PCP  Jacques Kaminski, [facility] as Pharmacist (Pharmacist)  Celi Morris, [facility] as Pharmacist (Pharmacist Ambulatory Care)  Evelyn Potter PA-C as Assigned OBGYN Provider    The following health maintenance items are reviewed in Epic and correct as of today:  Health Maintenance Due   Topic Date Due     ANNUAL REVIEW OF HM ORDERS  Never done     HEPATITIS C SCREENING  Never done     ZOSTER IMMUNIZATION (2 of 3) 2009     COLORECTAL CANCER SCREENING  2020     DEXA  2021               Review of Systems   Constitutional: Negative for chills and fever.   HENT: Negative for congestion, ear pain, hearing loss and sore throat.    Eyes: Negative for pain and visual disturbance.   Respiratory: Negative for cough and shortness of breath.    Cardiovascular: Negative for chest pain, palpitations and peripheral edema.   Gastrointestinal: Negative for abdominal pain, constipation, diarrhea, heartburn, hematochezia and nausea.   Breasts:  Negative for tenderness, breast mass and discharge.   Genitourinary: Negative for dysuria, frequency, genital sores, hematuria, pelvic pain, urgency, vaginal bleeding and vaginal  "discharge.   Musculoskeletal: Positive for arthralgias and joint swelling. Negative for myalgias.   Skin: Negative for rash.   Neurological: Negative for dizziness, weakness, headaches and paresthesias.   Psychiatric/Behavioral: Negative for mood changes. The patient is not nervous/anxious.      Blood pressure study   7 months  Home blood pressure 120-130/70-80  Takes blood pressure every morning      Tried selective serotonin reuptake inhibitor and does not work   Propranolol uses as needed       OBJECTIVE:   /84   Pulse 97   Temp 98.3  F (36.8  C) (Oral)   Resp 16   Ht 1.626 m (5' 4\")   Wt 69.9 kg (154 lb)   SpO2 99%   BMI 26.43 kg/m   Estimated body mass index is 26.43 kg/m  as calculated from the following:    Height as of this encounter: 1.626 m (5' 4\").    Weight as of this encounter: 69.9 kg (154 lb).  Physical Exam  GENERAL: healthy, alert and no distress  HENT: ear canals and TM's normal, nose and mouth without ulcers or lesions  RESP: lungs clear to auscultation - no rales, rhonchi or wheezes  CV: regular rate and rhythm, normal S1 S2, no S3 or S4, no murmur, click or rub, no peripheral edema and peripheral pulses strong  ABDOMEN: soft, nontender, no hepatosplenomegaly, no masses and bowel sounds normal  MS: no gross musculoskeletal defects noted, no edema  SKIN: no suspicious lesions or rashes  NEURO: Normal strength and tone, mentation intact and speech normal  PSYCH: mentation appears normal, affect normal/bright    Diagnostic Test Results:  Labs reviewed in [facility]  Lab     ASSESSMENT / PLAN:   (Z00.00) Routine general medical examination at a health care facility  (primary encounter diagnosis)  Comment:   Plan: Lipid panel reflex to direct LDL Fasting,         Comprehensive metabolic panel (BMP + Alb, Alk         Phos, ALT, AST, Total. Bili, TP), TSH with free        T4 reflex, CBC with platelets and differential,        Hepatitis C Screen Reflex to HCV RNA Quant and         Genotype        " "    (F41.1) Generalized anxiety disorder  Comment: discussed and will try  Has propranolol as well   Plan: busPIRone (BUSPAR) 5 MG tablet, propranolol         (INDERAL) 10 MG tablet            (H81.13) Benign paroxysmal positional vertigo due to bilateral vestibular disorder  Comment:   Plan: meclizine prn - gets OTC    (E78.2) Mixed hyperlipidemia  Comment: tolerating medication   Plan: lab     (Z11.59) Need for hepatitis C screening test  Comment:   Plan: Hepatitis C Screen Reflex to HCV RNA Quant and         Genotype            (Z12.11) Special screening for malignant neoplasms, colon  Comment: last colon she had she was too sedated and they said if needed would do with anesthesia present   No history colon cancer - willing to do cologuard   Plan: COLOGUARD(EXACT SCIENCES)            (M81.0) Osteoporosis, unspecified osteoporosis type, unspecified pathological fracture presence  Comment:   Plan: DX Hip/Pelvis/Spine            (E55.9) Vitamin D deficiency  Comment:   Plan: Vitamin D Deficiency            (L65.9) Hair loss  Comment:   Plan: TSH with free T4 reflex, CBC with platelets and        differential              Patient has been advised of split billing requirements and indicates understanding:   COUNSELING:  Reviewed preventive health counseling, as reflected in patient instructions       Regular exercise       Healthy diet/nutrition       Osteoporosis prevention/bone health       Colon cancer screening       Hepatitis C screening    Estimated body mass index is 26.43 kg/m  as calculated from the following:    Height as of this encounter: 1.626 m (5' 4\").    Weight as of this encounter: 69.9 kg (154 lb).        She reports that she quit smoking about 42 years ago. She has never used smokeless tobacco.      Appropriate preventive services were discussed with this patient, including applicable screening as appropriate for cardiovascular disease, diabetes, osteopenia/osteoporosis, and glaucoma.  As " appropriate for age/gender, discussed screening for colorectal cancer, prostate cancer, breast cancer, and cervical cancer. Checklist reviewing preventive services available has been given to the patient.    Reviewed patients plan of care and provided an AVS. The Basic Care Plan (routine screening as documented in Health Maintenance) for Carola meets the Care Plan requirement. This Care Plan has been established and reviewed with the Patient.    Counseling Resources:  ATP IV Guidelines  Pooled Cohorts Equation Calculator  Breast Cancer Risk Calculator  Breast Cancer: Medication to Reduce Risk  FRAX Risk Assessment  ICSI Preventive Guidelines  Dietary Guidelines for Americans, 2010  USDA's MyPlate  ASA Prophylaxis  Lung CA Screening    KENA Wilhelm CNP  [facility]    Identified Health Risks:  Answers for HPI/ROS submitted by the patient on 12/4/2021  PHQ9 TOTAL SCORE: 0  AIDA 7 TOTAL SCORE: 5

## 2021-12-07 NOTE — PATIENT INSTRUCTIONS
Buspar 2.5 mg daily for a week   Then 2.5 mg twice daily for a week   Then  Could do 1 tab twice daily - 5 mg   Or could do 2.5 am and 5 mg pm and then in a week increase to 5 mg twice daily     Then let me know how it is going     Lab in [address]

## 2021-12-07 NOTE — NURSING NOTE
"Chief Complaint   Patient presents with     Medicare Visit     fasting     initial BP (!) 172/96   Pulse 97   Temp 98.3  F (36.8  C) (Oral)   Resp 16   Ht 1.626 m (5' 4\")   Wt 69.9 kg (154 lb)   SpO2 99%   BMI 26.43 kg/m   Estimated body mass index is 26.43 kg/m  as calculated from the following:    Height as of this encounter: 1.626 m (5' 4\").    Weight as of this encounter: 69.9 kg (154 lb)..  bp completed using cuff size regular  JUDAH MAURER LPN  "

## 2021-12-07 NOTE — LETTER
December 9, 2021      Carola Cox  [address] [id number]        Dear ,    We are writing to inform you of your test results.    Lab acceptable       Resulted Orders   Comprehensive metabolic panel (BMP + Alb, Alk Phos, ALT, AST, Total. Bili, TP)   Result Value Ref Range    Sodium 140 133 - 144 mmol/L    Potassium 4.0 3.4 - 5.3 mmol/L    Chloride 108 94 - 109 mmol/L    Carbon Dioxide (CO2) 27 20 - 32 mmol/L    Anion Gap 5 3 - 14 mmol/L    Urea Nitrogen 11 7 - 30 mg/dL    Creatinine 0.79 0.52 - 1.04 mg/dL    Calcium 9.1 8.5 - 10.1 mg/dL    Glucose 102 (H) 70 - 99 mg/dL    Alkaline Phosphatase 61 40 - 150 U/L    AST 16 0 - 45 U/L    ALT 23 0 - 50 U/L    Protein Total 7.6 6.8 - 8.8 g/dL    Albumin 3.6 3.4 - 5.0 g/dL    Bilirubin Total 0.3 0.2 - 1.3 mg/dL    GFR Estimate 74 >60 mL/min/1.73m2      Comment:      As of July 11, 2021, eGFR is calculated by the CKD-EPI creatinine equation, without race adjustment. eGFR can be influenced by muscle mass, exercise, and diet. The reported eGFR is an estimation only and is only applicable if the renal function is stable.   TSH with free T4 reflex   Result Value Ref Range    TSH 1.29 0.40 - 4.00 mU/L   Hepatitis C Screen Reflex to HCV RNA Quant and Genotype   Result Value Ref Range    Hepatitis C Antibody Nonreactive Nonreactive    Narrative    Assay performance characteristics have not been established for newborns, infants, and children.   Vitamin D Deficiency   Result Value Ref Range    Vitamin D, Total (25-Hydroxy) 27 20 - 75 ug/L    Narrative    Season, race, dietary intake, and treatment affect the concentration of 25-hydroxy-Vitamin D. Values may decrease during winter months and increase during summer months. Values 20-29 ug/L may indicate Vitamin D insufficiency and values <20 ug/L may indicate Vitamin D deficiency.    Vitamin D determination is routinely performed by an immunoassay specific for 25 hydroxyvitamin D3.  If an  individual is on vitamin D2(ergocalciferol) supplementation, please specify 25 OH vitamin D2 and D3 level determination by LCMSMS test VITD23.     CBC with platelets and differential   Result Value Ref Range    WBC Count 8.1 4.0 - 11.0 10e3/uL    RBC Count 5.08 3.80 - 5.20 10e6/uL    Hemoglobin 14.9 11.7 - 15.7 g/dL    Hematocrit 47.2 (H) 35.0 - 47.0 %    MCV 93 78 - 100 fL    MCH 29.3 26.5 - 33.0 pg    MCHC 31.6 31.5 - 36.5 g/dL    RDW 13.6 10.0 - 15.0 %    Platelet Count 240 150 - 450 10e3/uL    % Neutrophils 75 %    % Lymphocytes 17 %    % Monocytes 6 %    % Eosinophils 1 %    % Basophils 0 %    Absolute Neutrophils 6.1 1.6 - 8.3 10e3/uL    Absolute Lymphocytes 1.4 0.8 - 5.3 10e3/uL    Absolute Monocytes 0.5 0.0 - 1.3 10e3/uL    Absolute Eosinophils 0.1 0.0 - 0.7 10e3/uL    Absolute Basophils 0.0 0.0 - 0.2 10e3/uL       If you have any questions or concerns, please call the clinic at the number listed above.       Sincerely,      KENA Wilhelm CNP

## 2021-12-08 LAB
ALBUMIN SERPL-MCNC: 3.6 G/DL (ref 3.4–5)
ALP SERPL-CCNC: 61 U/L (ref 40–150)
ALT SERPL W P-5'-P-CCNC: 23 U/L (ref 0–50)
ANION GAP SERPL CALCULATED.3IONS-SCNC: 5 MMOL/L (ref 3–14)
AST SERPL W P-5'-P-CCNC: 16 U/L (ref 0–45)
BILIRUB SERPL-MCNC: 0.3 MG/DL (ref 0.2–1.3)
BUN SERPL-MCNC: 11 MG/DL (ref 7–30)
CALCIUM SERPL-MCNC: 9.1 MG/DL (ref 8.5–10.1)
CHLORIDE BLD-SCNC: 108 MMOL/L (ref 94–109)
CO2 SERPL-SCNC: 27 MMOL/L (ref 20–32)
CREAT SERPL-MCNC: 0.79 MG/DL (ref 0.52–1.04)
DEPRECATED CALCIDIOL+CALCIFEROL SERPL-MC: 27 UG/L (ref 20–75)
GFR SERPL CREATININE-BSD FRML MDRD: 74 ML/MIN/1.73M2
GLUCOSE BLD-MCNC: 102 MG/DL (ref 70–99)
HCV AB SERPL QL IA: NONREACTIVE
POTASSIUM BLD-SCNC: 4 MMOL/L (ref 3.4–5.3)
PROT SERPL-MCNC: 7.6 G/DL (ref 6.8–8.8)
SODIUM SERPL-SCNC: 140 MMOL/L (ref 133–144)
TSH SERPL DL<=0.005 MIU/L-ACNC: 1.29 MU/L (ref 0.4–4)

## 2021-12-08 RX ORDER — PROPRANOLOL HYDROCHLORIDE 10 MG/1
5-10 TABLET ORAL 2 TIMES DAILY PRN
Qty: 180 TABLET | Refills: 1 | Status: SHIPPED | OUTPATIENT
Start: 2021-12-08 | End: 2024-09-09

## 2021-12-13 ENCOUNTER — TRANSFERRED RECORDS (OUTPATIENT)
Dept: HEALTH INFORMATION MANAGEMENT | Facility: CLINIC | Age: 71
End: 2021-12-13
Payer: COMMERCIAL

## 2021-12-15 LAB
CHOLEST SERPL-MCNC: 180 MG/DL
FASTING STATUS PATIENT QL REPORTED: YES
HDLC SERPL-MCNC: 63 MG/DL
LDLC SERPL CALC-MCNC: 96 MG/DL
NONHDLC SERPL-MCNC: 117 MG/DL
TRIGL SERPL-MCNC: 106 MG/DL

## 2022-01-13 ENCOUNTER — ANCILLARY PROCEDURE (OUTPATIENT)
Dept: BONE DENSITY | Facility: CLINIC | Age: 72
End: 2022-01-13
Attending: NURSE PRACTITIONER
Payer: COMMERCIAL

## 2022-01-13 DIAGNOSIS — M81.0 OSTEOPOROSIS, UNSPECIFIED OSTEOPOROSIS TYPE, UNSPECIFIED PATHOLOGICAL FRACTURE PRESENCE: ICD-10-CM

## 2022-01-13 PROCEDURE — 77085 DXA BONE DENSITY AXL VRT FX: CPT | Performed by: INTERNAL MEDICINE

## 2022-04-01 ENCOUNTER — TELEPHONE (OUTPATIENT)
Dept: UROLOGY | Facility: CLINIC | Age: 72
End: 2022-04-01
Payer: COMMERCIAL

## 2022-04-01 DIAGNOSIS — Z87.442 H/O RENAL CALCULI: Primary | ICD-10-CM

## 2022-04-01 NOTE — TELEPHONE ENCOUNTER
[facility] Call Center    Phone Message    May a detailed message be left on voicemail: yes     Reason for Call: Order(s): Other:   Reason for requested: KUB  Date needed: asap  Provider name: Poonam Green    Please reach out to patient when orders have been placed so she can schedule. Thank you!      Action Taken: Message routed to:  [facility] ([facility] Uro    Travel Screening: Not Applicable

## 2022-04-27 ENCOUNTER — IMMUNIZATION (OUTPATIENT)
Dept: NURSING | Facility: CLINIC | Age: 72
End: 2022-04-27
Payer: COMMERCIAL

## 2022-04-27 PROCEDURE — 91306 COVID-19,PF,MODERNA (18+ YRS BOOSTER .25ML): CPT

## 2022-04-27 PROCEDURE — 0064A COVID-19,PF,MODERNA (18+ YRS BOOSTER .25ML): CPT

## 2022-05-20 ENCOUNTER — HOSPITAL ENCOUNTER (OUTPATIENT)
Dept: GENERAL RADIOLOGY | Facility: CLINIC | Age: 72
Discharge: HOME | End: 2022-05-20
Attending: PHYSICIAN ASSISTANT | Admitting: PHYSICIAN ASSISTANT
Payer: COMMERCIAL

## 2022-05-20 DIAGNOSIS — Z87.442 H/O RENAL CALCULI: ICD-10-CM

## 2022-05-20 PROCEDURE — 74019 RADEX ABDOMEN 2 VIEWS: CPT

## 2022-05-23 ENCOUNTER — OFFICE VISIT (OUTPATIENT)
Dept: UROLOGY | Facility: CLINIC | Age: 72
End: 2022-05-23
Payer: COMMERCIAL

## 2022-05-23 VITALS
BODY MASS INDEX: 26.29 KG/M2 | DIASTOLIC BLOOD PRESSURE: 64 MMHG | SYSTOLIC BLOOD PRESSURE: 134 MMHG | WEIGHT: 154 LBS | HEIGHT: 64 IN

## 2022-05-23 DIAGNOSIS — N20.0 NEPHROLITHIASIS: Primary | ICD-10-CM

## 2022-05-23 PROCEDURE — 99213 OFFICE O/P EST LOW 20 MIN: CPT | Performed by: PHYSICIAN ASSISTANT

## 2022-05-23 RX ORDER — TRIAMCINOLONE ACETONIDE 55 UG/1
1 SPRAY, METERED NASAL DAILY
COMMUNITY
End: 2023-05-22

## 2022-05-23 ASSESSMENT — PAIN SCALES - GENERAL: PAINLEVEL: NO PAIN (0)

## 2022-05-23 NOTE — PATIENT INSTRUCTIONS
Follow up in 1 year with KUB and office visit.    Contact us in the interim with questions, concerns, or changes in symptomatology. [phone]    If more stones or increased stone size, would recommend repeat metabolic evaluation.

## 2022-05-23 NOTE — LETTER
5/23/2022       RE: Carola Cox  [address] [address]     Dear Colleague,    Thank you for referring your patient, Carola Cox, to the [facility] [place] at [facility]. Please see a copy of my visit note below.    Subjective      CHIEF COMPLAINT/REASON FOR VISIT   Patient of Dr. Clark's seen on my personal calendar  Follow up on kidney stones     HISTORY OF PRESENT ILLNESS   Ms. Cox is a very pleasant 72-year-old female, who presents today for follow-up regarding nephrolithiasis.  She was last seen by Dr. Clark on 05/20/2021.  She had undergone left-sided stent placement with bilateral extravascular shockwave lithotripsy in the operating room with him on 05/13/2021.  She also has a history of percutaneous nephrolithotomy with Dr. Clark on 11/3/2020.    KUB was obtained and stent was removed by Dr. Clark on 05/20/2021.  KUB did not show any additional stones.  Patient has been on a diet since prior to her metabolic evaluation to reduce stones.  She was trialed on a thiazide diuretic for hypercalciuria, but she was unable to tolerate this.    Patient continues to have good oral intake.  She denies any hematuria or dysuria.  Denies any flank pain.  She is wondering about repeat metabolic evaluation.  She has not had any episodes of passing kidney stone since we last saw her.    The following portions of the patient's history were reviewed and updated as appropriate: allergies, current medications, past family history, past medical history, past social history, past surgical history, and problem list.     REVIEW OF SYSTEMS   Review of Systems   Constitutional: Negative.    Genitourinary: Negative for dysuria, flank pain and hematuria.      Per HPI.     Patient Active Problem List   Diagnosis     CARDIOVASCULAR SCREENING; LDL GOAL LESS THAN 160     Osteoporosis     Generalized anxiety disorder      "BPPV (benign paroxysmal positional vertigo)     Hyperlipidemia     Cardiac dysrhythmia     Atrophic vaginitis     H/O wheezing     Kidney stones     Nephrolithiasis     Stone, kidney     Kidney stone      Past Medical History:   Diagnosis Date     Atrophic vaginitis      BPPV (benign paroxysmal positional vertigo)      Cardiac dysrhythmia     beta blocker for this in past     Complication of anesthesia      Endometriosis of pelvic peritoneum      Generalized anxiety disorder 2014    cannot tolerate SSRIs. Trial of 12.5mg sertraline and had severe nausea, diarrhea, dizziness. low dose propranolol prn and counseling      History of postmenopausal HRT      Hyperlipidemia      Mumps      Osteoporosis      Palpitations      PONV (postoperative nausea and vomiting)       Objective      PHYSICAL EXAM   /64   Ht 1.626 m (5' 4\")   Wt 69.9 kg (154 lb)   BMI 26.43 kg/m     Physical Exam  Constitutional:       Appearance: Normal appearance.   HENT:      Head: Normocephalic.      Nose: Nose normal.   Eyes:      General: No scleral icterus.  Pulmonary:      Effort: Pulmonary effort is normal.   Abdominal:      General: There is no distension.   Musculoskeletal:         General: Normal range of motion.      Cervical back: Normal range of motion.   Neurological:      General: No focal deficit present.      Mental Status: She is alert and oriented to person, place, and time.   Psychiatric:         Mood and Affect: Mood normal.         Behavior: Behavior normal.     IMAGING     I personally reviewed the images.     XR KUB [[id number]]    Result Date: 5/20/2022  ABDOMEN ONE VIEW/KUB   5/20/2022 8:29 AM HISTORY: History of renal calculi. COMPARISON: CT abdomen and pelvis on 8/4/2020.     IMPRESSION: Supine views of the abdomen and pelvis were obtained. Approximately 4 mm radiodensity projects over the expected location of the left kidney, could represent kidney stone. No definite right kidney stone, although significant portion " of the kidneys are obscured by overlying colon. Moderate amount of stool throughout the colon, otherwise nonobstructive bowel gas pattern. ELIAZAR MYERS MD   SYSTEM ID:  [id number]    Assessment & Plan    1. Nephrolithiasis      I had the pleasure today meeting with Ms. Cox to discuss follow-up for her nephrolithiasis.  We reviewed her KUB imaging which shows a possible 4 mm stone over the area of the lower portion of the left kidney.  This was not noted on previous imaging.     We discussed that we could continue monitoring it and its current size.  Patient is agreement with this plan.    -Would recommend KUB and office visit in 1 year for kidney stone monitoring.    Patient will contact us in the interim with questions, concerns, or changes in symptomatology.    We discussed possible repeat metabolic evaluation.    -If on next years KUB there are more stones and/or they are increasing in size, would consider repeat metabolic evaluation.  Also, if significant increase in size or stone burden, would consider preemptive treatment again with possible ESWL or ureteroscopy with laser lithotripsy.    -Continue with good fluid intake and current dietary changes for kidney stone prevention.    Signed by:     Poonam Green PA-C

## 2022-05-23 NOTE — NURSING NOTE
Chief Complaint   Patient presents with     Hx of kidney stones     1 year follow up, review KAT Hughes, CMA

## 2022-05-24 ASSESSMENT — ENCOUNTER SYMPTOMS
DYSURIA: 0
FLANK PAIN: 0
HEMATURIA: 0

## 2022-05-24 NOTE — PROGRESS NOTES
Subjective      CHIEF COMPLAINT/REASON FOR VISIT   Patient of Dr. Clark's seen on my personal calendar  Follow up on kidney stones     HISTORY OF PRESENT ILLNESS   Ms. Cox is a very pleasant 72-year-old female, who presents today for follow-up regarding nephrolithiasis.  She was last seen by Dr. Clark on 05/20/2021.  She had undergone left-sided stent placement with bilateral extravascular shockwave lithotripsy in the operating room with him on 05/13/2021.  She also has a history of percutaneous nephrolithotomy with Dr. Clark on 11/3/2020.    KUB was obtained and stent was removed by Dr. Clark on 05/20/2021.  KUB did not show any additional stones.  Patient has been on a diet since prior to her metabolic evaluation to reduce stones.  She was trialed on a thiazide diuretic for hypercalciuria, but she was unable to tolerate this.    Patient continues to have good oral intake.  She denies any hematuria or dysuria.  Denies any flank pain.  She is wondering about repeat metabolic evaluation.  She has not had any episodes of passing kidney stone since we last saw her.    The following portions of the patient's history were reviewed and updated as appropriate: allergies, current medications, past family history, past medical history, past social history, past surgical history, and problem list.     REVIEW OF SYSTEMS   Review of Systems   Constitutional: Negative.    Genitourinary: Negative for dysuria, flank pain and hematuria.      Per HPI.     Patient Active Problem List   Diagnosis     CARDIOVASCULAR SCREENING; LDL GOAL LESS THAN 160     Osteoporosis     Generalized anxiety disorder     BPPV (benign paroxysmal positional vertigo)     Hyperlipidemia     Cardiac dysrhythmia     Atrophic vaginitis     H/O wheezing     Kidney stones     Nephrolithiasis     Stone, kidney     Kidney stone      Past Medical History:   Diagnosis Date     Atrophic vaginitis      BPPV (benign paroxysmal positional vertigo)       "Cardiac dysrhythmia     beta blocker for this in past     Complication of anesthesia      Endometriosis of pelvic peritoneum      Generalized anxiety disorder 2014    cannot tolerate SSRIs. Trial of 12.5mg sertraline and had severe nausea, diarrhea, dizziness. low dose propranolol prn and counseling      History of postmenopausal HRT      Hyperlipidemia      Mumps      Osteoporosis      Palpitations      PONV (postoperative nausea and vomiting)         Objective      PHYSICAL EXAM   /64   Ht 1.626 m (5' 4\")   Wt 69.9 kg (154 lb)   BMI 26.43 kg/m     Physical Exam  Constitutional:       Appearance: Normal appearance.   HENT:      Head: Normocephalic.      Nose: Nose normal.   Eyes:      General: No scleral icterus.  Pulmonary:      Effort: Pulmonary effort is normal.   Abdominal:      General: There is no distension.   Musculoskeletal:         General: Normal range of motion.      Cervical back: Normal range of motion.   Neurological:      General: No focal deficit present.      Mental Status: She is alert and oriented to person, place, and time.   Psychiatric:         Mood and Affect: Mood normal.         Behavior: Behavior normal.       IMAGING     I personally reviewed the images.     XR KUB [[id number]]    Result Date: 5/20/2022  ABDOMEN ONE VIEW/KUB   5/20/2022 8:29 AM HISTORY: History of renal calculi. COMPARISON: CT abdomen and pelvis on 8/4/2020.     IMPRESSION: Supine views of the abdomen and pelvis were obtained. Approximately 4 mm radiodensity projects over the expected location of the left kidney, could represent kidney stone. No definite right kidney stone, although significant portion of the kidneys are obscured by overlying colon. Moderate amount of stool throughout the colon, otherwise nonobstructive bowel gas pattern. ELIAZAR MYERS MD   SYSTEM ID:  [id number]    Assessment & Plan    1. Nephrolithiasis      I had the pleasure today meeting with Ms. Cox to discuss follow-up for her " nephrolithiasis.  We reviewed her KUB imaging which shows a possible 4 mm stone over the area of the lower portion of the left kidney.  This was not noted on previous imaging.     We discussed that we could continue monitoring it and its current size.  Patient is agreement with this plan.    -Would recommend KUB and office visit in 1 year for kidney stone monitoring.    Patient will contact us in the interim with questions, concerns, or changes in symptomatology.    We discussed possible repeat metabolic evaluation.    -If on next years KUB there are more stones and/or they are increasing in size, would consider repeat metabolic evaluation.  Also, if significant increase in size or stone burden, would consider preemptive treatment again with possible ESWL or ureteroscopy with laser lithotripsy.    -Continue with good fluid intake and current dietary changes for kidney stone prevention.      Signed by:       Poonam Green PA-C

## 2022-05-25 ASSESSMENT — ENCOUNTER SYMPTOMS: CONSTITUTIONAL NEGATIVE: 1

## 2022-06-01 ENCOUNTER — TRANSFERRED RECORDS (OUTPATIENT)
Dept: HEALTH INFORMATION MANAGEMENT | Facility: CLINIC | Age: 72
End: 2022-06-01
Payer: COMMERCIAL

## 2022-06-09 ENCOUNTER — TRANSFERRED RECORDS (OUTPATIENT)
Dept: HEALTH INFORMATION MANAGEMENT | Facility: CLINIC | Age: 72
End: 2022-06-09
Payer: COMMERCIAL

## 2022-09-22 ENCOUNTER — TRANSFERRED RECORDS (OUTPATIENT)
Dept: HEALTH INFORMATION MANAGEMENT | Facility: CLINIC | Age: 72
End: 2022-09-22

## 2022-10-15 ENCOUNTER — HEALTH MAINTENANCE LETTER (OUTPATIENT)
Age: 72
End: 2022-10-15

## 2022-11-10 ENCOUNTER — OFFICE VISIT (OUTPATIENT)
Dept: PHARMACY | Facility: CLINIC | Age: 72
End: 2022-11-10
Payer: COMMERCIAL

## 2022-11-10 VITALS — SYSTOLIC BLOOD PRESSURE: 138 MMHG | HEART RATE: 106 BPM | DIASTOLIC BLOOD PRESSURE: 88 MMHG | OXYGEN SATURATION: 97 %

## 2022-11-10 DIAGNOSIS — J30.1 SEASONAL ALLERGIC RHINITIS DUE TO POLLEN: ICD-10-CM

## 2022-11-10 DIAGNOSIS — H81.13 BENIGN PAROXYSMAL POSITIONAL VERTIGO DUE TO BILATERAL VESTIBULAR DISORDER: ICD-10-CM

## 2022-11-10 DIAGNOSIS — F41.1 GENERALIZED ANXIETY DISORDER: Primary | ICD-10-CM

## 2022-11-10 DIAGNOSIS — E78.5 HYPERLIPIDEMIA, UNSPECIFIED HYPERLIPIDEMIA TYPE: ICD-10-CM

## 2022-11-10 DIAGNOSIS — N20.0 KIDNEY STONES: ICD-10-CM

## 2022-11-10 DIAGNOSIS — I49.8 OTHER CARDIAC ARRHYTHMIA: ICD-10-CM

## 2022-11-10 DIAGNOSIS — K21.00 GASTROESOPHAGEAL REFLUX DISEASE WITH ESOPHAGITIS WITHOUT HEMORRHAGE: ICD-10-CM

## 2022-11-10 DIAGNOSIS — I10 BENIGN ESSENTIAL HYPERTENSION: ICD-10-CM

## 2022-11-10 DIAGNOSIS — M17.0 OSTEOARTHRITIS OF BOTH KNEES, UNSPECIFIED OSTEOARTHRITIS TYPE: ICD-10-CM

## 2022-11-10 PROCEDURE — 99605 MTMS BY PHARM NP 15 MIN: CPT | Performed by: PHARMACIST

## 2022-11-10 PROCEDURE — 99607 MTMS BY PHARM ADDL 15 MIN: CPT | Performed by: PHARMACIST

## 2022-11-10 NOTE — LETTER
_  Medication List        Prepared on: Nov 10, 2022     Bring your Medication List when you go to the doctor, hospital, or   emergency room. And, share it with your family or caregivers.     Note any changes to how you take your medications.  Cross out medications when you no longer use them.    Medication How I take it Why I use it Prescriber   acetaminophen (TYLENOL) 500 MG tablet Take 1-2 tablets (500-1,000) mg by mouth every 6 hours as needed for mild pain Knee pain Patient Reported   albuterol (PROAIR HFA/PROVENTIL HFA/VENTOLIN HFA) 108 (90 Base) MCG/ACT inhaler INHALE 2 PUFFS INTO THE LUNGS EVERY 6 HOURS AS NEEDED FOR SHORTNESS OF BREATH OR DIFFICULT BREATHING OR WHEEZING H/O wheezing Leann Sue MD   famotidine (PEPCID) 20 MG tablet Take 1 tablet (20 mg) by mouth nightly as needed  heartburn Patient Reported   meclizine (ANTIVERT) 25 MG tablet Take 1 tablet (25 mg) by mouth 3 times daily as needed for dizziness Positional vertigo, bilateral Leann Sue MD   propranolol (INDERAL) 10 MG tablet Take 0.5-1 tablets (5-10 mg) by mouth 2 times daily as needed (anxiety) (1/2 x 10mg) Generalized Anxiety Disorder KENA Wilhelm CNP   simvastatin (ZOCOR) 20 MG tablet TAKE 1 TABLET (20 mg) BY MOUTH EVERY DAY AT BEDTIME Mixed Hyperlipidemia Leann Sue MD   triamcinolone (NASACORT) 55 MCG/ACT nasal aerosol Spray 1 spray in nostril daily Allergy Patient Reported         Add new medications, over-the-counter drugs, herbals, vitamins, or  minerals in the blank rows below.    Medication How I take it Why I use it Prescriber                          Allergies:      cefprozil; amlodipine; penicillin v; tamsulosin        Side effects I have had:               Other Information:              My notes and questions:

## 2022-11-10 NOTE — LETTER
November 11, 2022  Carola L Kenny  [id number] [address] [facility] [address]    Dear Ms. Cox, [facility]     Thank you for talking with me on Nov 10, 2022 about your health and medications. As a follow-up to our conversation, I have included two documents:      1. Your Recommended To-Do List has steps you should take to get the best results from your medications.  2. Your Medication List will help you keep track of your medications and how to take them.    If you want to talk about these documents, please call Arely Vidal RPH at phone: [phone], Monday-Friday 8-4:30pm.    I look forward to working with you and your doctors to make sure your medications work well for you.    Sincerely,  Arely Vidal RPH  [facility] Pharmacist, [facility]

## 2022-11-10 NOTE — PROGRESS NOTES
Medication Therapy Management (MTM) Encounter    ASSESSMENT:                            Medication Adherence/Access: will order PGX testing per patient request    Arthritis:  Recommend using Tylenol 1000 mg three times daily for mild/moderate pain.  Can step up to ibuprofen for moderate pain not managed by Tylenol.  Discussed reducing use of NSAID as much as possible due to potential side effects.  Also could try topical Voltaren for knee pain.       SVT/Hypertension: planning to try propranolol again to help with rapid heart rate and anxiety.    Anxiety: plans to try propranolol again.    Kidney Stone:  stable    Hyperlipidemia: stable    Positional Vertigo: stable    GERD: stable     Allergic Rhinitis: stable    PLAN:                            1.  Tylenol up to 3000 mg daily for mild/moderate pain, can step up to Ibuprofen for moderate/severe pain.  Also can try Voltaren Gel up to four times daily for knee pain.  2.  I will send PGX referral    Follow-up: Return in about 1 year (around 11/10/2023) for Medication Therapy Management.    SUBJECTIVE/OBJECTIVE:                          Carola Cox is a 72 year old female coming in for a follow-up visit.  Today's visit is a follow-up MTM visit from 10/11/21     Reason for visit: annual review    Allergies/ADRs: Reviewed in chart  Tobacco: She reports that she quit smoking about 43 years ago. She has never used smokeless tobacco.  Alcohol: not currently using    Medication Adherence/Access: no issues reported.  Patient has significant history of medication sensitivities.  She has considered PGX testing in the past and would like to pursue at this time.      Arthritis:  In both knees. S/p cortisone injections. Questions about Tylenol vs Ibuprofen.  Also uses ice as needed.      SVT/Hypertension:   Has been on beta-blockers and amlodipine in the past but had a hard time tolerating them; prefers to be off therapy which Cardiology is aware of.  She does have  propranolol to take as needed.    BP Readings from Last 3 Encounters:   05/23/22 134/64   12/07/21 134/84   10/11/21 130/86       Anxiety: Has propranolol 5mg daily to take as needed for symptoms but doesn't like to use because she doesn't like the way she feels.  But considering trying again. Has tried hydroxyzine but that put her to sleep.    AIDA-7 SCORE 3/26/2020 4/24/2020 12/4/2021   Total Score - - 5 (mild anxiety)   Total Score 1 2 5       Kidney Stone:  Completed bilateral extracorporeal shock wave lithotripsy for bilateral kidney stones on 5/13/21.  No stones at this time. Followed by Urology.      Hyperlipidemia: Current therapy includes simvastatin 20 mg once daily.    Pt reports no significant myalgias or other side effects.     Recent Labs   Lab Test 12/07/21  1007 11/20/20  0915 11/15/16  0948 10/28/15  0937   CHOL 180 201*   < > 152   HDL 63 60   < > 55   LDL 96 120*   < > 76   TRIG 106 106   < > 104   CHOLHDLRATIO  --   --   --  2.8    < > = values in this interval not displayed.     The 10-year ASCVD risk score (Mikkimagalis DIAZ Jr., et al., 2013) is: 18.1%    Values used to calculate the score:      Age: 71 years      Sex: Female      Is Non- : No      Diabetic: No      Tobacco smoker: No      Systolic Blood Pressure: 132 mmHg      Is BP treated: Yes      HDL Cholesterol: 60 mg/dL      Total Cholesterol: 201 mg/dL     Positional Vertigo: Has meclizine as needed.  No symptoms for some time.    GERD: Current medications include: Pepcid (famotidine) 20 mg as needed; 4 times per week.  Effective for her.  Has changed diet (kidney stone diet) so whenever she strays from the diet she has some symptoms.     Allergic Rhinitis: Current medications include albuterol as needed - feels like tight bronchial tubes that are related to allergies.  Also use the Advair as needed which was helpful.  Using Nasacort daily this time of year and Astepro as needed.   Symptoms worse in the fall.      Today's  Vitals: /88   Pulse 106   SpO2 97%   ----------------      I spent 30 minutes with this patient today. All changes were made via collaborative practice agreement with eLann Sue MD. A copy of the visit note was provided to the patient's provider(s).    The patient was given a summary of these recommendations.     Arely Vidal , Pharm D  [phone] (phone)  Medication Therapy Management Pharmacist           Distant Location (provider location):  On-site  Provider has received verbal consent for a visit from the patient? Yes     Medication Therapy Recommendations  Generalized anxiety disorder    Current Medication: propranolol (INDERAL) 10 MG tablet   Rationale: Undesirable effect - Adverse medication event - Safety   Recommendation: Referral to Service    Status: Accepted per CPA         Osteoarthritis of both knees, unspecified osteoarthritis type    Rationale: Does not understand instructions - Adherence - Adherence   Recommendation: Provide Education   Status: Accepted - no CPA Needed

## 2022-11-10 NOTE — PATIENT INSTRUCTIONS
"Recommendations from today's MTM visit:                                                       1.  Tylenol up to 3000 mg daily for mild/moderate pain, can step up to Ibuprofen for moderate/severe pain.  Also can try Voltaren Gel up to four times daily for knee pain.    2.  I will send pharmacogenomic testing referral    3.  Consider restarting propranolol for help with anxiety and blood pressure    4.  Michaelle Dunn and Wilfredo Dai are new PA in the clinics    Follow-up: Return in about 1 year (around 11/10/2023) for Medication Therapy Management.    It was great speaking with you today.  I value your experience and would be very thankful for your time in providing feedback in our clinic survey. In the next few days, you may receive an email or text message from [employer] with a link to a survey related to your  clinical pharmacist.\"     To schedule another MTM appointment, please call the clinic directly or you may call the MTM scheduling line at [phone] or toll-free at [phone].     My Clinical Pharmacist's contact information:                                                      Please feel free to contact me with any questions or concerns you have.      Arely Vidal , Pharm D  [phone] (phone)  Medication Therapy Management Pharmacist     "

## 2022-11-10 NOTE — LETTER
"Recommended To-Do List      Prepared on: Nov 10, 2022       You can get the best results from your medications by completing the items on this \"To-Do List.\"      Bring your To-Do List when you go to your doctor. And, share it with your family or caregivers.    My To-Do List:  What we talked about: What I should do:   An issue with your medication    Make an appointment with: Genetic Counselor to discuss PGX testing           What we talked about: What I should do:   The importance of taking your medication as intended    Education: Tylenol 500-1000 mg up to three times daily for knee pain.           What we talked about: What I should do:                       "

## 2022-11-11 ENCOUNTER — TELEPHONE (OUTPATIENT)
Dept: CONSULT | Facility: CLINIC | Age: 72
End: 2022-11-11

## 2022-11-11 RX ORDER — ACETAMINOPHEN 500 MG
500-1000 TABLET ORAL EVERY 6 HOURS PRN
COMMUNITY

## 2022-11-11 NOTE — TELEPHONE ENCOUNTER
Spoke with patient and assisted in scheduling appointment.     Future Appointments   Date Time Provider Department Center   11/21/2022  8:00 AM Kiesha Mcgarry [facility]

## 2022-11-21 ENCOUNTER — VIRTUAL VISIT (OUTPATIENT)
Dept: CONSULT | Facility: CLINIC | Age: 72
End: 2022-11-21
Attending: INTERNAL MEDICINE
Payer: COMMERCIAL

## 2022-11-21 ENCOUNTER — APPOINTMENT (OUTPATIENT)
Dept: LAB | Facility: CLINIC | Age: 72
End: 2022-11-21
Payer: COMMERCIAL

## 2022-11-21 VITALS — BODY MASS INDEX: 25.75 KG/M2 | WEIGHT: 150 LBS

## 2022-11-21 DIAGNOSIS — Z71.83 ENCOUNTER FOR NONPROCREATIVE GENETIC COUNSELING AND TESTING: ICD-10-CM

## 2022-11-21 DIAGNOSIS — T50.905A ADVERSE EFFECT OF DRUG, INITIAL ENCOUNTER: ICD-10-CM

## 2022-11-21 DIAGNOSIS — Z13.71 ENCOUNTER FOR NONPROCREATIVE GENETIC COUNSELING AND TESTING: ICD-10-CM

## 2022-11-21 DIAGNOSIS — F41.1 GENERALIZED ANXIETY DISORDER: ICD-10-CM

## 2022-11-21 DIAGNOSIS — T50.905A: Primary | ICD-10-CM

## 2022-11-21 PROCEDURE — 96040 HC GENETIC COUNSELING, EACH 30 MINUTES: CPT | Mod: TEL,95

## 2022-11-21 ASSESSMENT — PAIN SCALES - GENERAL: PAINLEVEL: NO PAIN (0)

## 2022-11-21 NOTE — PROCEDURES
"Date of visit: 11/21/22    Presenting Information:   Carola Cox is a 72 year old female referred to the [facility] at the request of Leann Sue MD today. Corina was seen for a genetic counseling appointment to discuss the details of pharmacogenomic testing, including her personal medical history, personal medication history including adverse medication responses, her family history of relevant medication responses, and testing logistics. History is obtained from Corina and review of the medical record.     Carola reports a history of inability to handle medications that has been a frustration for a large portion of her adult life. She notes a \"constant issue with anything I've been given\"; she has a history of asking for pediatric doses of medications at the dentist and is required to have sedated procedures like colonoscopies done in hospitals rather than outpatient settings due to respiratory concerns.  She remembers most medications \"knocking [her] out\" and feeling \"not able to function\".     Carola has previously tried:     Anesthetic during surgery with concerning respiration, prompting suggestion from her specialist that future sedated procedures be done in hospitals rather than outpatient    Terrible reaction to pain pills and prefers to avoid - only kidney stone pain has been bad enough to merit accept pain medication     Carola reports that she has had generalized anxiety on and off for her whole life and that multiple anxiety medications have been trialed and failed after a few days because of how off they make her feel.    Carola is pursuing pharmacogenetic testing because she has been seeing a pharmacist in the [place] system for years and testing was recommended by her team. While she is currently in good health, she worries that in the future, she will have a difficult time adjusting to necessary medications as they may arise.       Family History: A three " generation pedigree was obtained and scanned into the electronic medical record. The relevant portions are described below:      Children- Carola has *** children.     Siblings- ***    Parents- ***    Maternal Relatives- ***    Paternal Relatives- ***     had alpha1 antitrypsin     Family history is otherwise largely non-contributory. Maternal ancestry is *** and paternal ancestry is ***. Consanguinity was denied.     Genetic Counseling Discussion:  For review, our bodies are made of cells that contain our chromosomes which are made up of long stretches of DNA containing our genes. Our genes serve as the instructions for our bodies to grow and function. We have two copies of each gene, one inherited from our mother and one inherited from our father.     What pharmacogenomic testing can tell us:  There are several factors that can determine how an individual responds to medications. Some of these factors include environmental factors such as lifestyle choices (diet, alcohol and/or tobacco use) or other medications an individual might be taking, and other factors can include a person's age, sex, ethnic background, disease, and underlying genetic factors. There are several genes in our body that provide instructions for breaking down the medications we take. Changes in these genes (sometimes called variants or polymorphisms/SNPs) can alter how the body breaks down certain medications. For example, a change in a gene can slow down the process of medication breakdown leading to too much of that medication/drug in the body which can cause side effects. On the other hand, a change in a gene can speed up the breakdown of a medication so a therapeutic level is not reached and the medication may not work well at the standard doses. Therefore, identifying changes in these genes via pharmacogenomic testing can help guide which medications might work best for an individual, what dose of a medication may be best, or if a  certain medication has a high risk for causing serious side effects.     The pharmacogenomic testing offered at [facility] analyzes several different polymorphisms in different genes associated with drug metabolism. These variants have been determined to be medically actionable by practice guidelines including CPIC (Clinical Pharmacogenetics Implementation Consortium), PharmGKB and/or FDA gene-drug interaction guidelines. Therefore, prescribing recommendations can be made by the results of this test, so this testing for Carola is DIAGNOSTIC and is NOT investigational.     The results of this test can provide guidance for several different types of drugs such as antiinflammatories, antithrombotics, lipid-lowering medication, acid-lowering medications, antiemetics, immunosuppressants, antivirals, antifungals, antidepressants, ADHD medications, antimetabolites, and/or hormone antagonists. This means that, while this testing was recommended for a specific reason right now (Carola's ***), it may provide guidance for future medication use.     As previously mentioned, we inherit our genes from our parents. This means that some of the pharmacogenomic variants found on this test may be shared by other relatives. These results could be helpful to share with other relatives, but it is important to remember that there are many factors that can contribute to how an individual responds to medications. Relatives should consider their own pharmacogenomics testing to better determine their recommendations and they should consult with their health care providers before making any changes.     What pharmacogenomic testing cannot tell us:  This pharmacogenomic testing is not looking at every known pharmacogenomic polymorphism and therefore the results cannot tell us about every possible gene-drug interaction. It is also not looking at genes outside of the scope of pharmacogenomics, and therefore, the results will not tell us  if Carola is at risk for other genetic conditions. If Carola has questions about a possible underlying genetic condition, she should talk with her primary care provider about seeking an appointment in our Genetics Clinic.     Testing logistics:  [facility] will try to obtain insurance coverage for the pharmacogenomic testing; however, this is not always a covered service. A cost waiver form (***ABN ***ARN ***Medicare replacement non-coverage form) is required, but cost to the patient is not expected to exceed $350.     A blood or buccal sample will be collected for DNA analysis. The results of this test take 1-2 weeks. An appointment will be made with the pharmacist to discuss these results in detail and to discuss the medication recommendations based on these results. These test results will be accessible in [employer] and may be viewable prior to the appointment with the pharmacist, but NO CHANGES SHOULD BE MADE TO MEDICATIONS BEFORE TALKING TO THE PHARMACIST OR HEALTHCARE PROVIDER.     Carola consented to pharmacogenomic testing today. The insurance and billing were explained and Carola agreed to the billing plan. ***Due to the fact that this was a virtual visit, Carola gave me verbal permission/consent to sign the genetic testing consent form and the cost waiver form (***ABN ***ARN ***Medicare replacement non-coverage form) on {hishertheir - lowercase:989529} behalf.     It was a pleasure meeting with Carola Cox today. {HE/SHE/THEY:692419} vocalized understanding of the information we discussed today and all {hishertheir - lowercase:720949} questions and concerns were addressed. {HE/SHE/THEY:507453} {HAS/HAVE:529824} been provided with my contact information should any questions arise regarding our visit or plan moving forward. In total, ***XX minutes were spent in televideo counseling with this patient.     Plan:  1. Carola will be mailed a buccal kit for sample collection for the  [employer]  [facility] Pharmacogenomics Profile. ***Carola will arrange a lab appointment at a [facility] laboratory to have a blood sample drawn for the [facility] Pharmacogenomic Test.   2. Carola will be scheduled with one of our [facility] pharmacists 1-2 weeks after {hishertheir - lowercase:397991} sample is collected to review the results of the Pharmacogenomics Profile. This appointment can be scheduled by calling [phone] after the sample is collected.     Kiesha Mcgarry MS, [facility]  Genetic Counselor  [facility]   Phone: [phone]  Email: [email or web address]

## 2022-11-21 NOTE — PROCEDURES
"Date of visit: 11/21/22    Presenting Information:   Carola Cox is a 72 year old female referred to the [facility] at the request of Leann Sue MD today. Corina was seen for a genetic counseling appointment to discuss the details of pharmacogenomic testing, including her personal medical history, personal medication history including adverse medication responses, her family history of relevant medication responses, and testing logistics. History is obtained from Corina and review of the medical record.     Corina reports a history of inability to handle medications that has been a frustration for a large portion of her adult life. She notes a \"constant issue with anything I've been given\"; she has a history of asking for pediatric doses of medications at the dentist and is required to have sedated procedures like colonoscopies done in hospitals rather than outpatient settings due to respiratory concerns.  She remembers most medications \"knocking [her] out\" and feeling \"not able to function\".     She has previously tried:     Anesthetic during surgery with concerning respiration, prompting suggestion from her specialist that future sedated procedures be done in hospitals rather than outpatient    Terrible reaction to pain pills and prefers to avoid - only kidney stone pain has been bad enough to merit accept pain medication     Carola reports that she has had generalized anxiety on and off for her whole life and that multiple anxiety medications have been trialed and failed after a few days because of how off they make her feel.    Corina is pursuing pharmacogenetic testing because she has been seeing a pharmacist in the [place] system for years and testing was recommended by her team. While she is currently in good health, she worries that in the future, she will have a difficult time adjusting to necessary management medications as the need for them may arise.       Family " History: A three generation pedigree was obtained and scanned into the electronic medical record. The relevant portions are described below:      Children- Corina has two children, one son and one daughter. Her daughter, 52, has Hashimoto's thyroiditis but is otherwise healthy; she has three children who are healthy. Her son, 50, has chronic tinnitus but is otherwise healthy; his three children are also healthy.     Siblings- Corina's younger brother has ulcerative colitis but is managing with lifestyle rather than medication. One younger sister is living and well; she does not take any prescription medications. Her other younger sister struggles with mental health and takes a number of mental health medications, including those for bipolar disorder and ADHD.      Parents- Corina's mother had mental health struggles and was on multiple medications for the majority of her life. Corina's father was reportedly well during his life.     Maternal Relatives- Corina's maternal grandmother had stomach and breast cancer in her 80s. We discussed age of onset lowering suspicion for genetic cancer risk, but not ruling it out. We discussed sharing this family history with her primary care doctor if she has not already done so.     Other - Corina's  had alpha-1 antitrypsin deficiency; her children are both obligate carriers of this condition but are otherwise well.     Family history is otherwise largely non-contributory. Maternal ancestry is Scandinavian and paternal ancestry is English, Scotch, and Scandinavian. Consanguinity was denied.     Genetic Counseling Discussion:  For review, our bodies are made of cells that contain our chromosomes which are made up of long stretches of DNA containing our genes. Our genes serve as the instructions for our bodies to grow and function. We have two copies of each gene, one inherited from our mother and one inherited from our father.     What pharmacogenomic testing can tell us:  There are  several factors that can determine how an individual responds to medications. Some of these factors include environmental factors such as lifestyle choices (diet, alcohol and/or tobacco use) or other medications an individual might be taking, and other factors can include a person's age, sex, ethnic background, disease, and underlying genetic factors. There are several genes in our body that provide instructions for breaking down the medications we take. Changes in these genes (sometimes called variants or polymorphisms/SNPs) can alter how the body breaks down certain medications. For example, a change in a gene can slow down the process of medication breakdown leading to too much of that medication/drug in the body which can cause side effects. On the other hand, a change in a gene can speed up the breakdown of a medication so a therapeutic level is not reached and the medication may not work well at the standard doses. Therefore, identifying changes in these genes via pharmacogenomic testing can help guide which medications might work best for an individual, what dose of a medication may be best, or if a certain medication has a high risk for causing serious side effects.     The pharmacogenomic testing offered at [facility] analyzes several different polymorphisms in different genes associated with drug metabolism. These variants have been determined to be medically actionable by practice guidelines including CPIC (Clinical Pharmacogenetics Implementation Consortium), PharmGKB and/or FDA gene-drug interaction guidelines. Therefore, prescribing recommendations can be made by the results of this test, so this testing for Carola is DIAGNOSTIC and is NOT investigational.     The results of this test can provide guidance for several different types of drugs such as antiinflammatories, antithrombotics, lipid-lowering medication, acid-lowering medications, antiemetics, immunosuppressants, antivirals,  antifungals, antidepressants, ADHD medications, antimetabolites, and/or hormone antagonists. This means that, while this testing was recommended for a specific reason right now (Carola's mental health management and future concerns), it may provide guidance for future medication use.     As previously mentioned, we inherit our genes from our parents. This means that some of the pharmacogenomic variants found on this test may be shared by other relatives. These results could be helpful to share with other relatives, but it is important to remember that there are many factors that can contribute to how an individual responds to medications. Relatives should consider their own pharmacogenomics testing to better determine their recommendations and they should consult with their health care providers before making any changes.     What pharmacogenomic testing cannot tell us:  This pharmacogenomic testing is not looking at every known pharmacogenomic polymorphism and therefore the results cannot tell us about every possible gene-drug interaction. It is also not looking at genes outside of the scope of pharmacogenomics, and therefore, the results will not tell us if Carola is at risk for other genetic conditions. If Carola has questions about a possible underlying genetic condition, she should talk with her primary care provider about seeking an appointment in our Genetics Clinic.     Testing logistics:  [facility] will try to obtain insurance coverage for the pharmacogenomic testing; however, this is not always a covered service. A cost waiver form (ABN) is required, but cost to the patient is not expected to exceed $350.     A blood or buccal sample will be collected for DNA analysis. The results of this test take 1-2 weeks. An appointment will be made with the pharmacist to discuss these results in detail and to discuss the medication recommendations based on these results. These test results will be  accessible in [employer] and may be viewable prior to the appointment with the pharmacist, but NO CHANGES SHOULD BE MADE TO MEDICATIONS BEFORE TALKING TO THE PHARMACIST OR HEALTHCARE PROVIDER.     Carola consented to pharmacogenomic testing today. The insurance and billing were explained and Carola agreed to the billing plan. Due to the fact that this was a virtual visit, she gave me verbal permission/consent to sign the genetic testing consent form and the cost waiver form (ABN) on her behalf.     It was a pleasure meeting with Corina Cox today. She vocalized understanding of the information we discussed today and all her questions and concerns were addressed. She has been provided with my contact information should any questions arise regarding our visit or plan moving forward. In total, 31 minutes were spent in televideo counseling with this patient.     Plan:  1. Corina will arrange a lab appointment at a [facility] laboratory to have a blood sample drawn for the [facility] Pharmacogenomic Test.   2. She will be scheduled with one of our [facility] pharmacists 1-2 weeks after her sample is collected to review the results of the Pharmacogenomics Profile. This appointment can be scheduled by calling [phone] after the sample is collected.     Kiesha Mcgarry MS, [facility]  Genetic Counselor  [facility]   Phone: [phone]  Email: [email or web address]

## 2022-11-21 NOTE — LETTER
11/21/2022      RE: Carola Cox  [id number] [address] [address]     Dear Colleague,    Thank you for the opportunity to participate in the care of your patient, Carola Cox, at the [facility] at [facility]. Please see a copy of my visit note below.    Corina is a 72 year old who is being evaluated via a billable telephone visit.      What phone number would you like to be contacted at? [phone]  How would you like to obtain your AVS? MyChart        Please do not hesitate to contact me if you have any questions/concerns.     Sincerely,       Yari Bacon, GC

## 2022-11-21 NOTE — LETTER
11/21/2022      RE: Carola Cox  [address] [address]       Corina is a 72 year old who is being evaluated via a billable telephone visit.      What phone number would you like to be contacted at? [phone]  How would you like to obtain your AVS? Donte Bacon,

## 2022-11-21 NOTE — PROGRESS NOTES
Corina is a 72 year old who is being evaluated via a billable telephone visit.      What phone number would you like to be contacted at? [phone]  How would you like to obtain your AVS? [employer]

## 2022-12-08 ENCOUNTER — LAB (OUTPATIENT)
Dept: LAB | Facility: CLINIC | Age: 72
End: 2022-12-08
Payer: COMMERCIAL

## 2022-12-08 DIAGNOSIS — T50.905A: ICD-10-CM

## 2022-12-08 DIAGNOSIS — F41.1 GENERALIZED ANXIETY DISORDER: ICD-10-CM

## 2022-12-08 LAB — LAB DIRECTOR RESULTS: NORMAL

## 2022-12-08 PROCEDURE — G0452 MOLECULAR PATHOLOGY INTERPR: HCPCS | Mod: 26 | Performed by: PATHOLOGY

## 2022-12-08 PROCEDURE — 81335 TPMT GENE COM VARIANTS: CPT | Mod: GA | Performed by: INTERNAL MEDICINE

## 2022-12-08 PROCEDURE — 81231 CYP3A5 GENE COMMON VARIANTS: CPT | Mod: GA | Performed by: INTERNAL MEDICINE

## 2022-12-08 PROCEDURE — 36415 COLL VENOUS BLD VENIPUNCTURE: CPT | Mod: GA

## 2022-12-19 ENCOUNTER — VIRTUAL VISIT (OUTPATIENT)
Dept: PHARMACY | Facility: CLINIC | Age: 72
End: 2022-12-19
Payer: COMMERCIAL

## 2022-12-19 DIAGNOSIS — M17.0 OSTEOARTHRITIS OF BOTH KNEES, UNSPECIFIED OSTEOARTHRITIS TYPE: Primary | ICD-10-CM

## 2022-12-19 DIAGNOSIS — N20.0 KIDNEY STONES: ICD-10-CM

## 2022-12-19 DIAGNOSIS — H81.13 BENIGN PAROXYSMAL POSITIONAL VERTIGO DUE TO BILATERAL VESTIBULAR DISORDER: ICD-10-CM

## 2022-12-19 DIAGNOSIS — E78.5 HYPERLIPIDEMIA, UNSPECIFIED HYPERLIPIDEMIA TYPE: ICD-10-CM

## 2022-12-19 DIAGNOSIS — I49.8 OTHER CARDIAC ARRHYTHMIA: ICD-10-CM

## 2022-12-19 DIAGNOSIS — K21.00 GASTROESOPHAGEAL REFLUX DISEASE WITH ESOPHAGITIS WITHOUT HEMORRHAGE: ICD-10-CM

## 2022-12-19 DIAGNOSIS — J30.1 SEASONAL ALLERGIC RHINITIS DUE TO POLLEN: ICD-10-CM

## 2022-12-19 DIAGNOSIS — I10 BENIGN ESSENTIAL HYPERTENSION: ICD-10-CM

## 2022-12-19 PROCEDURE — 99607 MTMS BY PHARM ADDL 15 MIN: CPT | Performed by: PHARMACIST

## 2022-12-19 PROCEDURE — 99606 MTMS BY PHARM EST 15 MIN: CPT | Performed by: PHARMACIST

## 2022-12-19 ASSESSMENT — ANXIETY QUESTIONNAIRES
2. NOT BEING ABLE TO STOP OR CONTROL WORRYING: NOT AT ALL
7. FEELING AFRAID AS IF SOMETHING AWFUL MIGHT HAPPEN: SEVERAL DAYS
5. BEING SO RESTLESS THAT IT IS HARD TO SIT STILL: NEARLY EVERY DAY
3. WORRYING TOO MUCH ABOUT DIFFERENT THINGS: SEVERAL DAYS
GAD7 TOTAL SCORE: 6
6. BECOMING EASILY ANNOYED OR IRRITABLE: NOT AT ALL
GAD7 TOTAL SCORE: 6
IF YOU CHECKED OFF ANY PROBLEMS ON THIS QUESTIONNAIRE, HOW DIFFICULT HAVE THESE PROBLEMS MADE IT FOR YOU TO DO YOUR WORK, TAKE CARE OF THINGS AT HOME, OR GET ALONG WITH OTHER PEOPLE: NOT DIFFICULT AT ALL
1. FEELING NERVOUS, ANXIOUS, OR ON EDGE: SEVERAL DAYS

## 2022-12-19 ASSESSMENT — PATIENT HEALTH QUESTIONNAIRE - PHQ9: 5. POOR APPETITE OR OVEREATING: NOT AT ALL

## 2022-12-19 NOTE — PROGRESS NOTES
"Medication Therapy Management (MTM) Encounter    ASSESSMENT:                            Medication Adherence/Access: No issues identified    Pharmacogenetic (PGx) Results: Pharmacogenetic testing was ordered for Carola Cox to assist in the treatment of anxiety/HTN/palpitation. See \"Pharmacogenomics Profile\" in lab result tab for complete list of pharmacogenetic results.     Results relevant for PGx consult reason:      CYP2B6 intermediate metabolizer: decreased CYP2B6 activity    CYP2C9 intermediate metabolizer: decreased CYP2C9 function    CYP2D6 intermediate metabolizer: decreased CYP2D6 activity     Other notable PGx results:      CY poor metabolizer: no CY function    *Phenotype only when drug interaction is present. If the interacting medication is discontinued, the patient will convert back to their original predicted phenotype. For more information about drug interactions and a more inclusive drug interaction reference the [employer]hart Table.     Pharmacogenetic Assessment and Recommendations: Variability in CYP2D6, ERP9P91, CYP2C9 and other genes can impact the effectiveness and risk toxicity of certain medications used. Based on this patient's pharmacogenetic test results and clinical assessment, consider the followin.  Use propanolol (2D6) substrate as needed and tolerated.  She is using lowest possible dose      Arthritis:  stable    SVT/Hypertension: stable    Anxiety: GAD7 stable. Continue with as needed therapy.    Kidney Stone:  Stable    Hyperlipidemia: plans to have     Positional Vertigo: stable    GERD: stable    Allergic Rhinitis: stable    PLAN:                            Continue current regimen    Follow-up: Return in about 6 months (around 2023) for Medication Therapy Management.    SUBJECTIVE/OBJECTIVE:                          Carola Cox is a 72 year old female contacted via secure video for a follow-up visit.  Today's visit is a follow-up MTM visit from " 11/10/22     Reason for visit: medication review    Allergies/ADRs: Reviewed in chart  Tobacco: She reports that she quit smoking about 43 years ago. Her smoking use included cigarettes. She has never used smokeless tobacco.  Alcohol: not currently using    Medication Adherence/Access: no issues reported    Pharmacogenetic Results: Pharmacogenetic testing was ordered to assist in the treatment of anxiety and hypertension (see below). See PDF report in laboratory tab for complete list of pharmacogenetic results.     Arthritis:  In both knees. S/p cortisone injections. Tylenol as needed.  Also uses ice as needed.      SVT/Hypertension:   Has been on beta-blockers and amlodipine in the past but had a hard time tolerating them; prefers to be off therapy which Cardiology is aware of.  She does have propranolol to take as needed.    Past medications:  Atenolol (made her really tired), amldoipine (side effects ?)    BP Readings from Last 3 Encounters:   11/10/22 138/88   05/23/22 134/64   12/07/21 134/84       Anxiety: Has propranolol 5mg daily to take as needed for symptoms but doesn't like to use because she doesn't like the way she feels (not like herself; flash of dizziness) after taking for a few days.  But considering trying again. Has tried hydroxyzine but that put her to sleep.    Thinks she has tried paroxetine and zoloft without benefit.    Unsure if she needs anything for anxiety at this time.  Very active in the community and social group.      AIDA-7 SCORE 4/24/2020 12/4/2021 12/19/2022   Total Score - 5 (mild anxiety) -   Total Score 2 5 6       Kidney Stone:  Completed bilateral extracorporeal shock wave lithotripsy for bilateral kidney stones on 5/13/21.  No stones at this time. Followed by Urology annually.      Hyperlipidemia: Current therapy includes simvastatin 20 mg once daily.    Pt reports no significant myalgias or other side effects.     Recent Labs   Lab Test 12/07/21  1007 11/20/20  0915  11/15/16  0948 10/28/15  0937   CHOL 180 201*   < > 152   HDL 63 60   < > 55   LDL 96 120*   < > 76   TRIG 106 106   < > 104   CHOLHDLRATIO  --   --   --  2.8    < > = values in this interval not displayed.     The 10-year ASCVD risk score (Mikki DIAZ Jr., et al., 2013) is: 18.1%    Values used to calculate the score:      Age: 71 years      Sex: Female      Is Non- : No      Diabetic: No      Tobacco smoker: No      Systolic Blood Pressure: 132 mmHg      Is BP treated: Yes      HDL Cholesterol: 60 mg/dL      Total Cholesterol: 201 mg/dL     Positional Vertigo: Has meclizine as needed.  No symptoms for a long time.      GERD: Current medications include: Pepcid (famotidine) 20 mg as needed; needs up to 4 times per week.  Effective for her.  Has changed diet (kidney stone diet) so whenever she strays from the diet she has some symptoms.     Allergic Rhinitis: Current medications include albuterol as needed - feels like tight bronchial tubes that are related to allergies.  Using Nasacort daily this time of year.  Symptoms worse in the fall.      Today's Vitals: There were no vitals taken for this visit.  ----------------      I spent 30 minutes with this patient today. All changes were made via collaborative practice agreement with Leann Sue MD. A copy of the visit note was provided to the patient's provider(s).    A summary of these recommendations was sent via [employer].    Arely Vidal , Pharm D  [phone] (phone)  Medication Therapy Management Pharmacist     Telemedicine Visit Details  Type of service:  Video Conference via [employer]  Start Time: 930M  End Time: 1000am  Originating Location (pt. Location): Home      Distant Location (provider location):  On-site  Provider has received verbal consent for a visit from the patient? Yes     Medication Therapy Recommendations  No medication therapy recommendations to display

## 2022-12-19 NOTE — PATIENT INSTRUCTIONS
"Recommendations from today's MTM visit:                                                         Continue current regimen    Follow-up: Return in about 6 months (around 6/19/2023) for Medication Therapy Management.    It was great speaking with you today.  I value your experience and would be very thankful for your time in providing feedback in our clinic survey. In the next few days, you may receive an email or text message from [facility] with a link to a survey related to your  clinical pharmacist.\"     To schedule another MTM appointment, please call the clinic directly or you may call the MTM scheduling line at [phone] or toll-free at [phone].     My Clinical Pharmacist's contact information:                                                      Please feel free to contact me with any questions or concerns you have.      Arely Vidal , Pharm D  [phone] (phone)  Medication Therapy Management Pharmacist     "

## 2023-01-29 DIAGNOSIS — E78.2 MIXED HYPERLIPIDEMIA: ICD-10-CM

## 2023-01-31 RX ORDER — SIMVASTATIN 20 MG
TABLET ORAL
Qty: 90 TABLET | Refills: 0 | Status: SHIPPED | OUTPATIENT
Start: 2023-01-31 | End: 2023-04-24

## 2023-01-31 NOTE — TELEPHONE ENCOUNTER
Medication is being filled for 1 time refill only due to:  Patient needs to be seen because it has been more than one year since last visit.    Routing to MA/TC pool. The Pt is due for a visit with PCP  Aviva HATFIELD RN, BSN

## 2023-03-26 ENCOUNTER — HEALTH MAINTENANCE LETTER (OUTPATIENT)
Age: 73
End: 2023-03-26

## 2023-04-24 ENCOUNTER — MYC REFILL (OUTPATIENT)
Dept: INTERNAL MEDICINE | Facility: CLINIC | Age: 73
End: 2023-04-24
Payer: COMMERCIAL

## 2023-04-24 DIAGNOSIS — E78.2 MIXED HYPERLIPIDEMIA: ICD-10-CM

## 2023-04-26 RX ORDER — SIMVASTATIN 20 MG
20 TABLET ORAL AT BEDTIME
Qty: 90 TABLET | Refills: 0 | Status: SHIPPED | OUTPATIENT
Start: 2023-04-26 | End: 2023-05-22

## 2023-04-26 NOTE — TELEPHONE ENCOUNTER
Routing refill request to provider for review/approval because:  Patient needs to be seen because it has been more than 1 year since last office visit.    Routed to covering provider

## 2023-04-29 DIAGNOSIS — E78.2 MIXED HYPERLIPIDEMIA: ICD-10-CM

## 2023-05-01 RX ORDER — SIMVASTATIN 20 MG
TABLET ORAL
Qty: 90 TABLET | Refills: 0 | OUTPATIENT
Start: 2023-05-01

## 2023-05-22 ENCOUNTER — OFFICE VISIT (OUTPATIENT)
Dept: FAMILY MEDICINE | Facility: CLINIC | Age: 73
End: 2023-05-22
Payer: COMMERCIAL

## 2023-05-22 VITALS
HEART RATE: 86 BPM | RESPIRATION RATE: 16 BRPM | OXYGEN SATURATION: 99 % | WEIGHT: 161 LBS | BODY MASS INDEX: 27.49 KG/M2 | DIASTOLIC BLOOD PRESSURE: 82 MMHG | HEIGHT: 64 IN | SYSTOLIC BLOOD PRESSURE: 128 MMHG

## 2023-05-22 DIAGNOSIS — F41.1 GENERALIZED ANXIETY DISORDER: Primary | ICD-10-CM

## 2023-05-22 DIAGNOSIS — R03.0 ELEVATED BLOOD PRESSURE READING WITHOUT DIAGNOSIS OF HYPERTENSION: ICD-10-CM

## 2023-05-22 DIAGNOSIS — E78.2 MIXED HYPERLIPIDEMIA: ICD-10-CM

## 2023-05-22 DIAGNOSIS — M17.10 ARTHRITIS OF KNEE: ICD-10-CM

## 2023-05-22 PROCEDURE — 96127 BRIEF EMOTIONAL/BEHAV ASSMT: CPT | Performed by: FAMILY MEDICINE

## 2023-05-22 PROCEDURE — 99214 OFFICE O/P EST MOD 30 MIN: CPT | Performed by: FAMILY MEDICINE

## 2023-05-22 RX ORDER — SIMVASTATIN 20 MG
20 TABLET ORAL AT BEDTIME
Qty: 90 TABLET | Refills: 3 | Status: SHIPPED | OUTPATIENT
Start: 2023-05-22 | End: 2024-08-07

## 2023-05-22 RX ORDER — ESCITALOPRAM OXALATE 5 MG/1
5 TABLET ORAL DAILY
Qty: 90 TABLET | Refills: 1 | Status: SHIPPED | OUTPATIENT
Start: 2023-05-22 | End: 2023-10-31

## 2023-05-22 ASSESSMENT — ANXIETY QUESTIONNAIRES
GAD7 TOTAL SCORE: 8
7. FEELING AFRAID AS IF SOMETHING AWFUL MIGHT HAPPEN: NOT AT ALL
1. FEELING NERVOUS, ANXIOUS, OR ON EDGE: MORE THAN HALF THE DAYS
GAD7 TOTAL SCORE: 8
IF YOU CHECKED OFF ANY PROBLEMS ON THIS QUESTIONNAIRE, HOW DIFFICULT HAVE THESE PROBLEMS MADE IT FOR YOU TO DO YOUR WORK, TAKE CARE OF THINGS AT HOME, OR GET ALONG WITH OTHER PEOPLE: SOMEWHAT DIFFICULT
GAD7 TOTAL SCORE: 8
6. BECOMING EASILY ANNOYED OR IRRITABLE: NOT AT ALL
5. BEING SO RESTLESS THAT IT IS HARD TO SIT STILL: SEVERAL DAYS
8. IF YOU CHECKED OFF ANY PROBLEMS, HOW DIFFICULT HAVE THESE MADE IT FOR YOU TO DO YOUR WORK, TAKE CARE OF THINGS AT HOME, OR GET ALONG WITH OTHER PEOPLE?: SOMEWHAT DIFFICULT
2. NOT BEING ABLE TO STOP OR CONTROL WORRYING: MORE THAN HALF THE DAYS
3. WORRYING TOO MUCH ABOUT DIFFERENT THINGS: MORE THAN HALF THE DAYS
7. FEELING AFRAID AS IF SOMETHING AWFUL MIGHT HAPPEN: NOT AT ALL
4. TROUBLE RELAXING: SEVERAL DAYS

## 2023-05-22 ASSESSMENT — ENCOUNTER SYMPTOMS
ARTHRALGIAS: 1
FEVER: 0

## 2023-05-22 ASSESSMENT — PATIENT HEALTH QUESTIONNAIRE - PHQ9
10. IF YOU CHECKED OFF ANY PROBLEMS, HOW DIFFICULT HAVE THESE PROBLEMS MADE IT FOR YOU TO DO YOUR WORK, TAKE CARE OF THINGS AT HOME, OR GET ALONG WITH OTHER PEOPLE: NOT DIFFICULT AT ALL
SUM OF ALL RESPONSES TO PHQ QUESTIONS 1-9: 2
SUM OF ALL RESPONSES TO PHQ QUESTIONS 1-9: 2

## 2023-05-22 NOTE — PROGRESS NOTES
Assessment & Plan     Generalized anxiety disorder  Uncontrolled, recommend restarting outpatient therapy.  Start Lexapro, monitor for frank.  Follow-up in 6 weeks after starting medications  - escitalopram (LEXAPRO) 5 MG tablet  Dispense: 90 tablet; Refill: 1    Mixed hyperlipidemia  Controlled, continue current medical management  - simvastatin (ZOCOR) 20 MG tablet  Dispense: 90 tablet; Refill: 3    Arthritis of knee  Follows with [facility] orthopedics, could try topical Voltaren for pain control, had reported limited control with intra-articular corticosteroid injection, could consider viscosupplementation which was informed to patient.    Elevated blood pressure reading without diagnosis of hypertension  Likely due to anxiety, blood pressure recheck after visit was normalized.  Continue to monitor.        Hilario Jacobo MD  [facility]    Jakob Arriaga is a 73 year old, presenting for the following health issues:  Lipids (Follow-up meds, cholesterol)        5/22/2023    10:46 AM   Additional Questions   Roomed by Melissa Quiñones     History of Present Illness       Hyperlipidemia:  She presents for follow up of hyperlipidemia.  She is taking medication to lower cholesterol. She is not having myalgia or other side effects to statin medications.    She eats 4 or more servings of fruits and vegetables daily.She consumes 0 sweetened beverage(s) daily.She exercises with enough effort to increase her heart rate 20 to 29 minutes per day.  She exercises with enough effort to increase her heart rate 5 days per week.   She is taking medications regularly.    Today's PHQ-9         PHQ-9 Total Score: 2    PHQ-9 Q9 Thoughts of better off dead/self-harm past 2 weeks :   Not at all    How difficult have these problems made it for you to do your work, take care of things at home, or get along with other people: Not difficult at all  Today's AIDA-7 Score: 8       Patient is a pleasant 73-year-old  "female presents for interval follow-up regarding hyperlipidemia, anxiety.    She also has questions about knee arthritis.      Review of Systems   Constitutional: Negative for fever.   Musculoskeletal: Positive for arthralgias.            Objective    /82 (BP Location: Right arm, Patient Position: Chair, Cuff Size: Adult Regular)   Pulse 86   Resp 16   Ht 1.626 m (5' 4\")   Wt 73 kg (161 lb)   SpO2 99%   BMI 27.64 kg/m    Body mass index is 27.64 kg/m .  Physical Exam  Vitals reviewed.   Constitutional:       Appearance: Normal appearance. She is not ill-appearing.   Cardiovascular:      Rate and Rhythm: Normal rate and regular rhythm.   Pulmonary:      Effort: Pulmonary effort is normal.      Breath sounds: Normal breath sounds.   Musculoskeletal:         General: No swelling. Normal range of motion.   Neurological:      Mental Status: She is alert.   Psychiatric:         Mood and Affect: Mood normal.         Behavior: Behavior normal.         Thought Content: Thought content normal.              The 10-year ASCVD risk score (Charly SMILEY, et al., 2019) is: 33.1%    Values used to calculate the score:      Age: 73 years      Sex: Female      Is Non- : No      Diabetic: No      Tobacco smoker: No      Systolic Blood Pressure: 167 mmHg      Is BP treated: Yes      HDL Cholesterol: 63 mg/dL      Total Cholesterol: 180 mg/dL                      "

## 2023-06-19 ENCOUNTER — HOSPITAL ENCOUNTER (OUTPATIENT)
Dept: GENERAL RADIOLOGY | Facility: CLINIC | Age: 73
Discharge: HOME | End: 2023-06-19
Attending: PHYSICIAN ASSISTANT | Admitting: PHYSICIAN ASSISTANT
Payer: COMMERCIAL

## 2023-06-19 DIAGNOSIS — N20.0 NEPHROLITHIASIS: ICD-10-CM

## 2023-06-19 PROCEDURE — 74018 RADEX ABDOMEN 1 VIEW: CPT

## 2023-06-30 ENCOUNTER — VIRTUAL VISIT (OUTPATIENT)
Dept: UROLOGY | Facility: CLINIC | Age: 73
End: 2023-06-30
Payer: COMMERCIAL

## 2023-06-30 VITALS — WEIGHT: 155 LBS | BODY MASS INDEX: 26.46 KG/M2 | HEIGHT: 64 IN

## 2023-06-30 DIAGNOSIS — N20.0 NEPHROLITHIASIS: Primary | ICD-10-CM

## 2023-06-30 PROCEDURE — 99213 OFFICE O/P EST LOW 20 MIN: CPT | Mod: VID | Performed by: PHYSICIAN ASSISTANT

## 2023-06-30 ASSESSMENT — PAIN SCALES - GENERAL: PAINLEVEL: NO PAIN (0)

## 2023-06-30 NOTE — LETTER
6/30/2023       RE: Carola Cox  [address] [facility], operated [facility] [id number]     Dear Colleague,    Thank you for referring your patient, Carola Cox, to the [facility] [place] at [facility]. Please see a copy of my visit note below.    ** Patient will meet you in My Chart    Corina is a 73 year old who is being evaluated via a billable video visit.      How would you like to obtain your AVS? MyChart  If the video visit is dropped, the invitation should be resent by: Text to cell phone: [phone]  Will anyone else be joining your video visit? No      Video-Visit Details    Type of service:  Video Visit   Video Start Time: 1405  Video End Time:1414    Originating Location (pt. Location): Home    Distant Location (provider location):  On-site  Platform used for Video Visit: Sera     CHIEF COMPLAINT/REASON FOR VISIT   Follow up on kidney stones    HISTORY OF PRESENT ILLNESS   Ms. Cox is very pleasant 73 year old year old female, who presents today for follow-up regarding nephrolithiasis.  I last saw her in May 2022.  She saw Dr. Clark in May 2021.  History of bilateral ESWL with left-sided ureteral stent with him on 05/13/2021.  She also has a history of PCNL with Dr. Clark in November 2020.    Patient has made dietary changes since her previous metabolic evaluation.  She was previously trialed on a thiazide diuretic, but was unable to tolerate this.  Patient notes that she is always good about her appropriate calcium intake and good hydration.  She does try to avoid high oxalate foods.  She does have known nephrolithiasis which we are monitoring.    Since last time I saw her, she denies passing any stones.  She does note occasional back pain, but not distinctly flank pain.  She denies any hematuria or dysuria.    The following portions of the patient's history were reviewed and updated as appropriate: allergies,  current medications, past family history, past medical history, past social history, past surgical history, and problem list.     REVIEW OF SYSTEMS   Review of Systems   Constitutional: Negative.    Genitourinary: Negative for dysuria, flank pain and hematuria.   Musculoskeletal: Positive for back pain.      Per HPI.     Patient Active Problem List   Diagnosis    CARDIOVASCULAR SCREENING; LDL GOAL LESS THAN 160    Osteoporosis    Generalized anxiety disorder    BPPV (benign paroxysmal positional vertigo)    Hyperlipidemia    Cardiac dysrhythmia    Atrophic vaginitis    H/O wheezing    Kidney stones    Nephrolithiasis    Stone, kidney    Kidney stone      Past Medical History:   Diagnosis Date    Atrophic vaginitis     BPPV (benign paroxysmal positional vertigo)     Cardiac dysrhythmia     beta blocker for this in past    Complication of anesthesia     Endometriosis of pelvic peritoneum     Generalized anxiety disorder 2014    cannot tolerate SSRIs. Trial of 12.5mg sertraline and had severe nausea, diarrhea, dizziness. low dose propranolol prn and counseling     History of postmenopausal HRT     Hyperlipidemia     Mumps     Osteoporosis     Palpitations     PONV (postoperative nausea and vomiting)         Objective      PHYSICAL EXAM   GENERAL: Healthy, alert and no distress  EYES: Eyes grossly normal to inspection.  No discharge or erythema, or obvious scleral/conjunctival abnormalities.  HENT: Normal cephalic/atraumatic.  External ears, nose and mouth without ulcers or lesions.  No nasal drainage visible.  NECK: No asymmetry, visible masses or scars  RESP: No audible wheeze, cough, or visible cyanosis.  No visible retractions or increased work of breathing.    MS: No gross musculoskeletal defects noted.  Normal range of motion.  No visible edema.  SKIN: Visible skin clear. No significant rash, abnormal pigmentation or lesions.  NEURO: Cranial nerves grossly intact.  Mentation and speech appropriate for age.  PSYCH:  Mentation appears normal, affect normal/bright, judgement and insight intact, normal speech and appearance well-groomed.     IMAGING     I personally reviewed the images.     XR KUB    Result Date: 6/19/2023  KUB   6/19/2023 10:40 AM HISTORY: Stone burden; Nephrolithiasis COMPARISON: Abdominal x-ray from 5/20/2022     IMPRESSION: Supine views of the abdomen and pelvis were obtained. Approximately 4 mm radiodensity projects over the expected location of the left kidney, likely representing kidney stone. No definite right kidney stone. Moderate amount of stool throughout the colon, otherwise nonobstructive bowel gas pattern. Multiple pelvic phleboliths.  ELIAZAR MYERS MD   SYSTEM ID:  [id number]     Assessment & Plan    1. Nephrolithiasis      I had the pleasure today of meeting with Ms. Cox to discuss her KUB and follow-up for nephrolithiasis.  She is continuing with dietary changes.  Her KUB imaging shows what appears to be a stable 4 mm stone in the left kidney.  No definitive other stones noted.  Patient has not had any symptomatic nephrolithiasis since since I last saw her.      -At this time, would continue with current dietary guidelines.    -Plan on follow-up in 1 year with KUB and office visit.    Signed by:   Poonam Green PA-C 6/30/2023 2:02 PM

## 2023-06-30 NOTE — PROGRESS NOTES
** Patient will meet you in My Chart    Corina is a 73 year old who is being evaluated via a billable video visit.      How would you like to obtain your AVS? RareCyteharTipRanks  If the video visit is dropped, the invitation should be resent by: Text to cell phone: [phone]  Will anyone else be joining your video visit? No      Video-Visit Details    Type of service:  Video Visit   Video Start Time: 1405  Video End Time:1414    Originating Location (pt. Location): Home    Distant Location (provider location):  On-site  Platform used for Video Visit: Sera     CHIEF COMPLAINT/REASON FOR VISIT   Follow up on kidney stones    HISTORY OF PRESENT ILLNESS   Ms. Cox is very pleasant 73 year old year old female, who presents today for follow-up regarding nephrolithiasis.  I last saw her in May 2022.  She saw Dr. Clark in May 2021.  History of bilateral ESWL with left-sided ureteral stent with him on 05/13/2021.  She also has a history of PCNL with Dr. Clark in November 2020.    Patient has made dietary changes since her previous metabolic evaluation.  She was previously trialed on a thiazide diuretic, but was unable to tolerate this.  Patient notes that she is always good about her appropriate calcium intake and good hydration.  She does try to avoid high oxalate foods.  She does have known nephrolithiasis which we are monitoring.    Since last time I saw her, she denies passing any stones.  She does note occasional back pain, but not distinctly flank pain.  She denies any hematuria or dysuria.    The following portions of the patient's history were reviewed and updated as appropriate: allergies, current medications, past family history, past medical history, past social history, past surgical history, and problem list.     REVIEW OF SYSTEMS   Review of Systems   Constitutional: Negative.    Genitourinary: Negative for dysuria, flank pain and hematuria.   Musculoskeletal: Positive for back pain.      Per HPI.     Patient  Active Problem List   Diagnosis     CARDIOVASCULAR SCREENING; LDL GOAL LESS THAN 160     Osteoporosis     Generalized anxiety disorder     BPPV (benign paroxysmal positional vertigo)     Hyperlipidemia     Cardiac dysrhythmia     Atrophic vaginitis     H/O wheezing     Kidney stones     Nephrolithiasis     Stone, kidney     Kidney stone      Past Medical History:   Diagnosis Date     Atrophic vaginitis      BPPV (benign paroxysmal positional vertigo)      Cardiac dysrhythmia     beta blocker for this in past     Complication of anesthesia      Endometriosis of pelvic peritoneum      Generalized anxiety disorder 2014    cannot tolerate SSRIs. Trial of 12.5mg sertraline and had severe nausea, diarrhea, dizziness. low dose propranolol prn and counseling      History of postmenopausal HRT      Hyperlipidemia      Mumps      Osteoporosis      Palpitations      PONV (postoperative nausea and vomiting)         Objective      PHYSICAL EXAM   GENERAL: Healthy, alert and no distress  EYES: Eyes grossly normal to inspection.  No discharge or erythema, or obvious scleral/conjunctival abnormalities.  HENT: Normal cephalic/atraumatic.  External ears, nose and mouth without ulcers or lesions.  No nasal drainage visible.  NECK: No asymmetry, visible masses or scars  RESP: No audible wheeze, cough, or visible cyanosis.  No visible retractions or increased work of breathing.    MS: No gross musculoskeletal defects noted.  Normal range of motion.  No visible edema.  SKIN: Visible skin clear. No significant rash, abnormal pigmentation or lesions.  NEURO: Cranial nerves grossly intact.  Mentation and speech appropriate for age.  PSYCH: Mentation appears normal, affect normal/bright, judgement and insight intact, normal speech and appearance well-groomed.     IMAGING     I personally reviewed the images.     XR KUB    Result Date: 6/19/2023  KUB   6/19/2023 10:40 AM HISTORY: Stone burden; Nephrolithiasis COMPARISON: Abdominal x-ray from  5/20/2022     IMPRESSION: Supine views of the abdomen and pelvis were obtained. Approximately 4 mm radiodensity projects over the expected location of the left kidney, likely representing kidney stone. No definite right kidney stone. Moderate amount of stool throughout the colon, otherwise nonobstructive bowel gas pattern. Multiple pelvic phleboliths.  ELIAZAR MYERS MD   SYSTEM ID:  [id number]     Assessment & Plan    1. Nephrolithiasis      I had the pleasure today of meeting with Ms. Cox to discuss her KUB and follow-up for nephrolithiasis.  She is continuing with dietary changes.  Her KUB imaging shows what appears to be a stable 4 mm stone in the left kidney.  No definitive other stones noted.  Patient has not had any symptomatic nephrolithiasis since since I last saw her.      -At this time, would continue with current dietary guidelines.    -Plan on follow-up in 1 year with KUB and office visit.    Signed by:       Poonam Green PA-C 6/30/2023 2:02 PM

## 2023-07-05 ASSESSMENT — ENCOUNTER SYMPTOMS
BACK PAIN: 1
CONSTITUTIONAL NEGATIVE: 1
DYSURIA: 0
FLANK PAIN: 0
HEMATURIA: 0

## 2023-07-05 NOTE — PATIENT INSTRUCTIONS
Plan on follow-up in 1 year with KUB and office visit.    Continue with current dietary changes.    Contact us in the interim with questions, concerns, or changes in symptomatology.  [phone]

## 2023-10-19 ENCOUNTER — PATIENT OUTREACH (OUTPATIENT)
Dept: CARE COORDINATION | Facility: CLINIC | Age: 73
End: 2023-10-19
Payer: COMMERCIAL

## 2023-10-23 ENCOUNTER — HOSPITAL ENCOUNTER (OUTPATIENT)
Dept: MAMMOGRAPHY | Facility: CLINIC | Age: 73
Discharge: HOME | End: 2023-10-23
Attending: FAMILY MEDICINE | Admitting: FAMILY MEDICINE
Payer: COMMERCIAL

## 2023-10-23 DIAGNOSIS — Z12.31 VISIT FOR SCREENING MAMMOGRAM: ICD-10-CM

## 2023-10-23 PROCEDURE — 77067 SCR MAMMO BI INCL CAD: CPT

## 2023-10-30 SDOH — HEALTH STABILITY: PHYSICAL HEALTH: ON AVERAGE, HOW MANY DAYS PER WEEK DO YOU ENGAGE IN MODERATE TO STRENUOUS EXERCISE (LIKE A BRISK WALK)?: 5 DAYS

## 2023-10-30 SDOH — HEALTH STABILITY: PHYSICAL HEALTH: ON AVERAGE, HOW MANY MINUTES DO YOU ENGAGE IN EXERCISE AT THIS LEVEL?: 30 MIN

## 2023-10-30 ASSESSMENT — ENCOUNTER SYMPTOMS
MYALGIAS: 0
ARTHRALGIAS: 0
DIARRHEA: 0
DIZZINESS: 0
ABDOMINAL PAIN: 0
DYSURIA: 0
JOINT SWELLING: 0
COUGH: 0
PARESTHESIAS: 0
HEARTBURN: 0
PALPITATIONS: 0
FEVER: 0
CHILLS: 0
SORE THROAT: 0
SHORTNESS OF BREATH: 0
BREAST MASS: 0
WEAKNESS: 0
CONSTIPATION: 0
HEADACHES: 0
EYE PAIN: 0
HEMATURIA: 0
NERVOUS/ANXIOUS: 0
HEMATOCHEZIA: 0
FREQUENCY: 0
NAUSEA: 0

## 2023-10-30 ASSESSMENT — SOCIAL DETERMINANTS OF HEALTH (SDOH)
HOW OFTEN DO YOU ATTEND CHURCH OR RELIGIOUS SERVICES?: MORE THAN 4 TIMES PER YEAR
DO YOU BELONG TO ANY CLUBS OR ORGANIZATIONS SUCH AS CHURCH GROUPS UNIONS, FRATERNAL OR ATHLETIC GROUPS, OR SCHOOL GROUPS?: YES
HOW OFTEN DO YOU ATTENT MEETINGS OF THE CLUB OR ORGANIZATION YOU BELONG TO?: MORE THAN 4 TIMES PER YEAR
IN A TYPICAL WEEK, HOW MANY TIMES DO YOU TALK ON THE PHONE WITH FAMILY, FRIENDS, OR NEIGHBORS?: MORE THAN THREE TIMES A WEEK
HOW OFTEN DO YOU GET TOGETHER WITH FRIENDS OR RELATIVES?: MORE THAN THREE TIMES A WEEK

## 2023-10-30 ASSESSMENT — LIFESTYLE VARIABLES
SKIP TO QUESTIONS 9-10: 1
HOW OFTEN DO YOU HAVE SIX OR MORE DRINKS ON ONE OCCASION: NEVER
HOW OFTEN DO YOU HAVE A DRINK CONTAINING ALCOHOL: NEVER
AUDIT-C TOTAL SCORE: 0
HOW MANY STANDARD DRINKS CONTAINING ALCOHOL DO YOU HAVE ON A TYPICAL DAY: PATIENT DOES NOT DRINK

## 2023-10-30 ASSESSMENT — ACTIVITIES OF DAILY LIVING (ADL): CURRENT_FUNCTION: NO ASSISTANCE NEEDED

## 2023-10-31 ENCOUNTER — OFFICE VISIT (OUTPATIENT)
Dept: FAMILY MEDICINE | Facility: CLINIC | Age: 73
End: 2023-10-31
Payer: COMMERCIAL

## 2023-10-31 VITALS
RESPIRATION RATE: 12 BRPM | HEART RATE: 68 BPM | SYSTOLIC BLOOD PRESSURE: 138 MMHG | OXYGEN SATURATION: 99 % | DIASTOLIC BLOOD PRESSURE: 80 MMHG | WEIGHT: 164 LBS | BODY MASS INDEX: 28 KG/M2 | TEMPERATURE: 98.1 F | HEIGHT: 64 IN

## 2023-10-31 DIAGNOSIS — Z12.11 SCREEN FOR COLON CANCER: ICD-10-CM

## 2023-10-31 DIAGNOSIS — Z00.00 ENCOUNTER FOR MEDICARE ANNUAL WELLNESS EXAM: Primary | ICD-10-CM

## 2023-10-31 LAB
ALBUMIN SERPL BCG-MCNC: 4.3 G/DL (ref 3.5–5.2)
ALP SERPL-CCNC: 63 U/L (ref 35–104)
ALT SERPL W P-5'-P-CCNC: 27 U/L (ref 0–50)
ANION GAP SERPL CALCULATED.3IONS-SCNC: 10 MMOL/L (ref 7–15)
AST SERPL W P-5'-P-CCNC: 31 U/L (ref 0–45)
BILIRUB SERPL-MCNC: 0.3 MG/DL
BUN SERPL-MCNC: 13.8 MG/DL (ref 8–23)
CALCIUM SERPL-MCNC: 9.8 MG/DL (ref 8.8–10.2)
CHLORIDE SERPL-SCNC: 104 MMOL/L (ref 98–107)
CREAT SERPL-MCNC: 0.78 MG/DL (ref 0.51–0.95)
DEPRECATED HCO3 PLAS-SCNC: 27 MMOL/L (ref 22–29)
EGFRCR SERPLBLD CKD-EPI 2021: 79 ML/MIN/1.73M2
GLUCOSE SERPL-MCNC: 97 MG/DL (ref 70–99)
POTASSIUM SERPL-SCNC: 4 MMOL/L (ref 3.4–5.3)
PROT SERPL-MCNC: 7.3 G/DL (ref 6.4–8.3)
SODIUM SERPL-SCNC: 141 MMOL/L (ref 135–145)
TSH SERPL DL<=0.005 MIU/L-ACNC: 1.24 UIU/ML (ref 0.3–4.2)
VIT D+METAB SERPL-MCNC: 31 NG/ML (ref 20–50)

## 2023-10-31 PROCEDURE — G0439 PPPS, SUBSEQ VISIT: HCPCS | Performed by: FAMILY MEDICINE

## 2023-10-31 PROCEDURE — 80053 COMPREHEN METABOLIC PANEL: CPT | Performed by: FAMILY MEDICINE

## 2023-10-31 PROCEDURE — 36415 COLL VENOUS BLD VENIPUNCTURE: CPT | Performed by: FAMILY MEDICINE

## 2023-10-31 PROCEDURE — 84443 ASSAY THYROID STIM HORMONE: CPT | Performed by: FAMILY MEDICINE

## 2023-10-31 PROCEDURE — 82306 VITAMIN D 25 HYDROXY: CPT | Performed by: FAMILY MEDICINE

## 2023-10-31 RX ORDER — RESPIRATORY SYNCYTIAL VIRUS VACCINE 120MCG/0.5
0.5 KIT INTRAMUSCULAR ONCE
Qty: 1 EACH | Refills: 0 | Status: CANCELLED | OUTPATIENT
Start: 2023-10-31 | End: 2023-10-31

## 2023-10-31 ASSESSMENT — ENCOUNTER SYMPTOMS
CONSTIPATION: 0
BREAST MASS: 0
SHORTNESS OF BREATH: 0
PARESTHESIAS: 0
DIZZINESS: 0
DYSURIA: 0
ABDOMINAL PAIN: 0
NAUSEA: 0
JOINT SWELLING: 0
COUGH: 0
HEMATURIA: 0
NERVOUS/ANXIOUS: 0
EYE PAIN: 0
SORE THROAT: 0
FEVER: 0
HEADACHES: 0
HEARTBURN: 0
HEMATOCHEZIA: 0
CHILLS: 0
DIARRHEA: 0
ARTHRALGIAS: 0
WEAKNESS: 0
MYALGIAS: 0
FREQUENCY: 0
PALPITATIONS: 0

## 2023-10-31 ASSESSMENT — ACTIVITIES OF DAILY LIVING (ADL): CURRENT_FUNCTION: NO ASSISTANCE NEEDED

## 2023-10-31 NOTE — PROGRESS NOTES
"SUBJECTIVE:   Corina is a 73 year old who presents for Preventive Visit.      10/31/2023     9:42 AM   Additional Questions   Roomed by Hamida HUGO       Are you in the first 12 months of your Medicare coverage?  No    Healthy Habits:     In general, how would you rate your overall health?  Excellent    Frequency of exercise:  4-5 days/week    Duration of exercise:  15-30 minutes    Do you usually eat at least 4 servings of fruit and vegetables a day, include whole grains    & fiber and avoid regularly eating high fat or \"junk\" foods?  Yes    Taking medications regularly:  Yes    Ability to successfully perform activities of daily living:  No assistance needed    Home Safety:  No safety concerns identified    Hearing Impairment:  No hearing concerns    In the past 6 months, have you been bothered by leaking of urine?  No    In general, how would you rate your overall mental or emotional health?  Good    Additional concerns today:  No      Today's PHQ-2 Score:       10/30/2023     4:51 PM   PHQ-2 ( 1999 [employer])   Q1: Little interest or pleasure in doing things 0   Q2: Feeling down, depressed or hopeless 0   PHQ-2 Score 0   Q1: Little interest or pleasure in doing things Not at all   Q2: Feeling down, depressed or hopeless Not at all   PHQ-2 Score 0           Have you ever done Advance Care Planning? (For example, a Health Directive, POLST, or a discussion with a medical provider or your loved ones about your wishes): No, advance care planning information given to patient to review.  Patient declined advance care planning discussion at this time.       Fall risk  Fallen 2 or more times in the past year?: No  Any fall with injury in the past year?: No    Cognitive Screening   1) Repeat 3 items (Leader, Season, Table)    2) Clock draw: NORMAL  3) 3 item recall: Recalls 3 objects  Results: NORMAL clock, 1-2 items recalled: COGNITIVE IMPAIRMENT LESS LIKELY    Mini-CogTM Copyright S Rachel. Licensed by the author for use in " [facility]; reprinted with permission (jalilkim@[facility]). All rights reserved.      Do you have sleep apnea, excessive snoring or daytime drowsiness? : yes    Reviewed and updated as needed this visit by clinical staff    Allergies  Meds              Reviewed and updated as needed this visit by Provider                 Social History     Tobacco Use    Smoking status: Former     Types: Cigarettes     Quit date: 1979     Years since quittin.8     Passive exposure: Past    Smokeless tobacco: Never   Substance Use Topics    Alcohol use: Not Currently     Alcohol/week: 0.0 standard drinks of alcohol             10/30/2023     4:51 PM   Alcohol Use   Prescreen: >3 drinks/day or >7 drinks/week? No     Do you have a current opioid prescription? No  Do you use any other controlled substances or medications that are not prescribed by a provider? None      Current providers sharing in care for this patient include:   Patient Care Team:  Hilario Jacobo MD as PCP - General  Jacques Kaminski [facility] as Pharmacist (Pharmacist)  Arely Vidal [facility] as Assigned [facility] Pharmacist  Poonam Green PA-C as Assigned Surgical Provider  Hilario Jacobo MD as Assigned PCP  Mya Araujo RPH as Pharmacist (Pharmacist)    The following health maintenance items are reviewed in Epic and correct as of today:  Health Maintenance   Topic Date Due    RSV VACCINE 60+ (1 - 1-dose 60+ series) Never done    COLORECTAL CANCER SCREENING  2020    ZOSTER IMMUNIZATION (3 of 3) 10/26/2022    MEDICARE ANNUAL WELLNESS VISIT  2022    DEXA  2024    AIDA ASSESSMENT  2024    ANNUAL REVIEW OF HM ORDERS  2024    FALL RISK ASSESSMENT  10/31/2024    MAMMO SCREENING  10/23/2025    LIPID  2026    ADVANCE CARE PLANNING  2026    DTAP/TDAP/TD IMMUNIZATION (2 - Td or Tdap) 2028    HEPATITIS C SCREENING  Completed    PHQ-2 (once per calendar year)  Completed    INFLUENZA VACCINE  Completed     "Pneumococcal Vaccine: 65+ Years  Completed    COVID-19 Vaccine  Completed    IPV IMMUNIZATION  Aged Out    HPV IMMUNIZATION  Aged Out    MENINGITIS IMMUNIZATION  Aged Out         Mammogram Screening - Patient over age 75, has elected to continue with screening.  Pertinent mammograms are reviewed under the imaging tab.    Review of Systems   Constitutional:  Negative for chills and fever.   HENT:  Negative for congestion, ear pain, hearing loss and sore throat.    Eyes:  Negative for pain and visual disturbance.   Respiratory:  Negative for cough and shortness of breath.    Cardiovascular:  Negative for chest pain, palpitations and peripheral edema.   Gastrointestinal:  Negative for abdominal pain, constipation, diarrhea, heartburn, hematochezia and nausea.   Breasts:  Negative for tenderness, breast mass and discharge.   Genitourinary:  Negative for dysuria, frequency, genital sores, hematuria, pelvic pain, urgency, vaginal bleeding and vaginal discharge.   Musculoskeletal:  Negative for arthralgias, joint swelling and myalgias.   Skin:  Negative for rash.   Neurological:  Negative for dizziness, weakness, headaches and paresthesias.   Psychiatric/Behavioral:  Negative for mood changes. The patient is not nervous/anxious.          OBJECTIVE:   /80   Pulse 68   Temp 98.1  F (36.7  C) (Oral)   Resp 12   Ht 1.626 m (5' 4\")   Wt 74.4 kg (164 lb)   SpO2 99%   BMI 28.15 kg/m   Estimated body mass index is 28.15 kg/m  as calculated from the following:    Height as of this encounter: 1.626 m (5' 4\").    Weight as of this encounter: 74.4 kg (164 lb).  Physical Exam  GENERAL: healthy, alert and no distress  NECK: no adenopathy, no asymmetry, masses, or scars and thyroid normal to palpation  RESP: lungs clear to auscultation - no rales, rhonchi or wheezes  CV: regular rate and rhythm, normal S1 S2, no S3 or S4, no murmur, click or rub, no peripheral edema and peripheral pulses strong  ABDOMEN: soft, nontender, no " "hepatosplenomegaly, no masses and bowel sounds normal  MS: no gross musculoskeletal defects noted, no edema        ASSESSMENT / PLAN:   Corina was seen today for medicare visit.    Diagnoses and all orders for this visit:    Encounter for Medicare annual wellness exam  -     Vitamin D Deficiency; Future  -     Comprehensive metabolic panel (BMP + Alb, Alk Phos, ALT, AST, Total. Bili, TP); Future  -     TSH with free T4 reflex; Future  -     Vitamin D Deficiency  -     Comprehensive metabolic panel (BMP + Alb, Alk Phos, ALT, AST, Total. Bili, TP)  -     TSH with free T4 reflex    Screen for colon cancer    Other orders  -     PRIMARY CARE FOLLOW-UP SCHEDULING; Future              COUNSELING:  Reviewed preventive health counseling, as reflected in patient instructions       Regular exercise       Healthy diet/nutrition       Colon cancer screening      BMI:   Estimated body mass index is 28.15 kg/m  as calculated from the following:    Height as of this encounter: 1.626 m (5' 4\").    Weight as of this encounter: 74.4 kg (164 lb).         She reports that she quit smoking about 44 years ago. Her smoking use included cigarettes. She has been exposed to tobacco smoke. She has never used smokeless tobacco.      Appropriate preventive services were discussed with this patient, including applicable screening as appropriate for fall prevention, nutrition, physical activity, Tobacco-use cessation, weight loss and cognition.  Checklist reviewing preventive services available has been given to the patient.    Reviewed patients plan of care and provided an AVS. The Basic Care Plan (routine screening as documented in Health Maintenance) for Carola meets the Care Plan requirement. This Care Plan has been established and reviewed with the Patient.        Hilario Jacobo MD  [facility]    Identified Health Risks:  I have reviewed Opioid Use Disorder and Substance Use Disorder risk factors and made any needed " referrals.

## 2023-10-31 NOTE — PATIENT INSTRUCTIONS
Patient Education   Personalized Prevention Plan  You are due for the preventive services outlined below.  Your care team is available to assist you in scheduling these services.  If you have already completed any of these items, please share that information with your care team to update in your medical record.  Health Maintenance Due   Topic Date Due     RSV VACCINE 60+ (1 - 1-dose 60+ series) Never done     Colorectal Cancer Screening  01/01/2020     Zoster (Shingles) Vaccine (3 of 3) 10/26/2022     Annual Wellness Visit  12/07/2022

## 2024-03-18 ENCOUNTER — TELEPHONE (OUTPATIENT)
Dept: FAMILY MEDICINE | Facility: CLINIC | Age: 74
End: 2024-03-18
Payer: COMMERCIAL

## 2024-03-18 NOTE — TELEPHONE ENCOUNTER
Patient Quality Outreach    Patient is due for the following:   Colon Cancer Screening    Next Steps:   Schedule a office visit for colonoscopy    Type of outreach:    Sent [employer] message.      Questions for provider review:    None           Karen Herndon CMA

## 2024-04-12 ENCOUNTER — HOSPITAL ENCOUNTER (EMERGENCY)
Facility: CLINIC | Age: 74
Discharge: HOME | End: 2024-04-12
Attending: EMERGENCY MEDICINE | Admitting: EMERGENCY MEDICINE
Payer: COMMERCIAL

## 2024-04-12 ENCOUNTER — APPOINTMENT (OUTPATIENT)
Dept: GENERAL RADIOLOGY | Facility: CLINIC | Age: 74
End: 2024-04-12
Attending: EMERGENCY MEDICINE
Payer: COMMERCIAL

## 2024-04-12 VITALS
WEIGHT: 160 LBS | SYSTOLIC BLOOD PRESSURE: 173 MMHG | BODY MASS INDEX: 27.31 KG/M2 | OXYGEN SATURATION: 100 % | TEMPERATURE: 97.6 F | HEIGHT: 64 IN | DIASTOLIC BLOOD PRESSURE: 90 MMHG | HEART RATE: 87 BPM | RESPIRATION RATE: 16 BRPM

## 2024-04-12 DIAGNOSIS — L08.9 ABRASION OF LEFT CHEST WALL WITH INFECTION, INITIAL ENCOUNTER: ICD-10-CM

## 2024-04-12 DIAGNOSIS — S20.312A ABRASION OF LEFT CHEST WALL WITH INFECTION, INITIAL ENCOUNTER: ICD-10-CM
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
## 2024-04-12 DIAGNOSIS — S50.12XA CONTUSION OF LEFT FOREARM, INITIAL ENCOUNTER: ICD-10-CM

## 2024-04-12 PROCEDURE — 71046 X-RAY EXAM CHEST 2 VIEWS: CPT

## 2024-04-12 PROCEDURE — 99284 EMERGENCY DEPT VISIT MOD MDM: CPT | Mod: 25

## 2024-04-12 PROCEDURE — 93005 ELECTROCARDIOGRAM TRACING: CPT

## 2024-04-12 ASSESSMENT — ACTIVITIES OF DAILY LIVING (ADL): ADLS_ACUITY_SCORE: 35

## 2024-04-12 ASSESSMENT — COLUMBIA-SUICIDE SEVERITY RATING SCALE - C-SSRS
6. HAVE YOU EVER DONE ANYTHING, STARTED TO DO ANYTHING, OR PREPARED TO DO ANYTHING TO END YOUR LIFE?: NO
2. HAVE YOU ACTUALLY HAD ANY THOUGHTS OF KILLING YOURSELF IN THE PAST MONTH?: NO
1. IN THE PAST MONTH, HAVE YOU WISHED YOU WERE DEAD OR WISHED YOU COULD GO TO SLEEP AND NOT WAKE UP?: NO
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
## 2024-04-15 LAB
ATRIAL RATE - MUSE: 87 BPM
DIASTOLIC BLOOD PRESSURE - MUSE: NORMAL MMHG
INTERPRETATION ECG - MUSE: NORMAL
P AXIS - MUSE: 33 DEGREES
PR INTERVAL - MUSE: 132 MS
QRS DURATION - MUSE: 74 MS
QT - MUSE: 356 MS
QTC - MUSE: 428 MS
R AXIS - MUSE: 5 DEGREES
SYSTOLIC BLOOD PRESSURE - MUSE: NORMAL MMHG
T AXIS - MUSE: 17 DEGREES
VENTRICULAR RATE- MUSE: 87 BPM

## 2024-06-25 ENCOUNTER — TRANSFERRED RECORDS (OUTPATIENT)
Dept: HEALTH INFORMATION MANAGEMENT | Facility: CLINIC | Age: 74
End: 2024-06-25
Payer: COMMERCIAL

## 2024-08-07 DIAGNOSIS — E78.2 MIXED HYPERLIPIDEMIA: ICD-10-CM

## 2024-08-07 RX ORDER — SIMVASTATIN 20 MG
20 TABLET ORAL AT BEDTIME
Qty: 90 TABLET | Refills: 1 | Status: SHIPPED | OUTPATIENT
Start: 2024-08-07

## 2024-09-09 ENCOUNTER — OFFICE VISIT (OUTPATIENT)
Dept: INTERNAL MEDICINE | Facility: CLINIC | Age: 74
End: 2024-09-09
Payer: COMMERCIAL

## 2024-09-09 VITALS
WEIGHT: 161 LBS | TEMPERATURE: 98.2 F | DIASTOLIC BLOOD PRESSURE: 78 MMHG | OXYGEN SATURATION: 99 % | HEIGHT: 64 IN | RESPIRATION RATE: 16 BRPM | HEART RATE: 85 BPM | BODY MASS INDEX: 27.49 KG/M2 | SYSTOLIC BLOOD PRESSURE: 134 MMHG

## 2024-09-09 DIAGNOSIS — R00.2 PALPITATIONS: ICD-10-CM

## 2024-09-09 DIAGNOSIS — E78.5 HYPERLIPIDEMIA, UNSPECIFIED HYPERLIPIDEMIA TYPE: Primary | ICD-10-CM

## 2024-09-09 DIAGNOSIS — E55.9 VITAMIN D DEFICIENCY: ICD-10-CM

## 2024-09-09 DIAGNOSIS — M81.0 OSTEOPOROSIS WITHOUT CURRENT PATHOLOGICAL FRACTURE, UNSPECIFIED OSTEOPOROSIS TYPE: ICD-10-CM

## 2024-09-09 DIAGNOSIS — Z13.1 SCREENING FOR DIABETES MELLITUS: ICD-10-CM

## 2024-09-09 LAB
ANION GAP SERPL CALCULATED.3IONS-SCNC: 12 MMOL/L (ref 7–15)
BUN SERPL-MCNC: 15.1 MG/DL (ref 8–23)
CALCIUM SERPL-MCNC: 9.9 MG/DL (ref 8.8–10.4)
CHLORIDE SERPL-SCNC: 104 MMOL/L (ref 98–107)
CHOLEST SERPL-MCNC: 175 MG/DL
CREAT SERPL-MCNC: 0.85 MG/DL (ref 0.51–0.95)
EGFRCR SERPLBLD CKD-EPI 2021: 71 ML/MIN/1.73M2
FASTING STATUS PATIENT QL REPORTED: YES
FASTING STATUS PATIENT QL REPORTED: YES
GLUCOSE SERPL-MCNC: 106 MG/DL (ref 70–99)
HCO3 SERPL-SCNC: 26 MMOL/L (ref 22–29)
HDLC SERPL-MCNC: 59 MG/DL
LDLC SERPL CALC-MCNC: 95 MG/DL
NONHDLC SERPL-MCNC: 116 MG/DL
POTASSIUM SERPL-SCNC: 4.2 MMOL/L (ref 3.4–5.3)
SODIUM SERPL-SCNC: 142 MMOL/L (ref 135–145)
TRIGL SERPL-MCNC: 107 MG/DL
VIT D+METAB SERPL-MCNC: 24 NG/ML (ref 20–50)

## 2024-09-09 PROCEDURE — 80061 LIPID PANEL: CPT

## 2024-09-09 PROCEDURE — 36415 COLL VENOUS BLD VENIPUNCTURE: CPT

## 2024-09-09 PROCEDURE — 99214 OFFICE O/P EST MOD 30 MIN: CPT

## 2024-09-09 PROCEDURE — 80048 BASIC METABOLIC PNL TOTAL CA: CPT

## 2024-09-09 PROCEDURE — 82306 VITAMIN D 25 HYDROXY: CPT

## 2024-09-09 PROCEDURE — G2211 COMPLEX E/M VISIT ADD ON: HCPCS

## 2024-09-09 RX ORDER — PROPRANOLOL HYDROCHLORIDE 10 MG/1
5-10 TABLET ORAL 2 TIMES DAILY PRN
Qty: 90 TABLET | Refills: 1 | Status: SHIPPED | OUTPATIENT
Start: 2024-09-09

## 2024-09-09 RX ORDER — PROPRANOLOL HYDROCHLORIDE 10 MG/1
TABLET ORAL
Qty: 180 TABLET | OUTPATIENT
Start: 2024-09-09

## 2024-09-09 ASSESSMENT — ANXIETY QUESTIONNAIRES
7. FEELING AFRAID AS IF SOMETHING AWFUL MIGHT HAPPEN: NOT AT ALL
GAD7 TOTAL SCORE: 4
8. IF YOU CHECKED OFF ANY PROBLEMS, HOW DIFFICULT HAVE THESE MADE IT FOR YOU TO DO YOUR WORK, TAKE CARE OF THINGS AT HOME, OR GET ALONG WITH OTHER PEOPLE?: NOT DIFFICULT AT ALL
GAD7 TOTAL SCORE: 4
GAD7 TOTAL SCORE: 4

## 2024-09-09 NOTE — PROGRESS NOTES
Assessment & Plan     History of anxiety- she has tried Zoloft in the past and had severe nausea and diarrhea. She was offered Effexor and Lexapro in the past but didn't ever try either medication. I did offer trying Celexa if she decides she is interested in this. She will let me know.    BP at home is consistently <135/80.       It looks like the last Cologuard test that was sent in 2021 was completed but there is no result in her chart.  I did recommend that she repeat this test 1 more time since it appears as though there was an error in the test kit received in 2021.  She tells me today she wishes to forego any future colon cancer screening given her age    (E78.5) Hyperlipidemia, unspecified hyperlipidemia type  (primary encounter diagnosis)  Comment: Update LDL-she is currently taking 20 mg of Simvastatin  Plan: Lipid panel reflex to direct LDL Fasting            (M81.0) Osteoporosis without current pathological fracture, unspecified osteoporosis type  Comment: It appears that she was on Fosamax from 2621-7484. There has not been discussion of restarting treatment for osteoporosis since. She is due for DXA. She denies any family history of osteoporosis or personal history of fracture.     Plan: DEXA HIP/PELVIS/SPINE - Future, Vitamin D         Deficiency            (R00.2) Palpitations  Comment:   Plan: propranolol (INDERAL) 10 MG tablet            (Z13.1) Screening for diabetes mellitus  Comment:   Plan: Basic metabolic panel  (Ca, Cl, CO2, Creat,         Gluc, K, Na, BUN)            (E55.9) Vitamin D deficiency  Comment:   Plan: Vitamin D Deficiency              The longitudinal plan of care for the diagnosis(es)/condition(s) as documented were addressed during this visit. Due to the added complexity in care, I will continue to support Corina in the subsequent management and with ongoing continuity of care.        BMI  Estimated body mass index is 27.64 kg/m  as calculated from the following:    Height as  "of this encounter: 1.626 m (5' 4\").    Weight as of this encounter: 73 kg (161 lb).         Jakob Arriaga is a 74 year old, presenting for the following health issues:  RECHECK and Lipids        9/9/2024     7:01 AM   Additional Questions   Roomed by SHIRIN Parker LPN   Accompanied by self         9/9/2024     7:01 AM   Patient Reported Additional Medications   Patient reports taking the following new medications none     History of Present Illness       Hyperlipidemia:  She presents for follow up of hyperlipidemia.   She is taking medication to lower cholesterol. She is not having myalgia or other side effects to statin medications.    She eats 2-3 servings of fruits and vegetables daily.She consumes 0 sweetened beverage(s) daily.She exercises with enough effort to increase her heart rate 20 to 29 minutes per day.  She exercises with enough effort to increase her heart rate 5 days per week.   She is taking medications regularly.             Objective    /78   Pulse 85   Temp 98.2  F (36.8  C) (Oral)   Resp 16   Ht 1.626 m (5' 4\")   Wt 73 kg (161 lb)   LMP  (LMP Unknown)   SpO2 99%   Breastfeeding No   BMI 27.64 kg/m    Body mass index is 27.64 kg/m .        Physical Exam  Constitutional:       General: She is not in acute distress.     Appearance: Normal appearance. She is not ill-appearing, toxic-appearing or diaphoretic.   HENT:      Head: Normocephalic and atraumatic.   Eyes:      Conjunctiva/sclera: Conjunctivae normal.   Cardiovascular:      Rate and Rhythm: Normal rate and regular rhythm.      Heart sounds: Normal heart sounds.   Pulmonary:      Effort: Pulmonary effort is normal.      Breath sounds: Normal breath sounds.   Skin:     General: Skin is warm and dry.   Neurological:      Mental Status: She is alert and oriented to person, place, and time.   Psychiatric:         Mood and Affect: Mood normal.         Behavior: Behavior normal.         Thought Content: Thought content normal.        "  Judgment: Judgment normal.           Signed Electronically by: KENA Covarrubias CNP

## 2024-09-10 ENCOUNTER — MYC MEDICAL ADVICE (OUTPATIENT)
Dept: INTERNAL MEDICINE | Facility: CLINIC | Age: 74
End: 2024-09-10
Payer: COMMERCIAL

## 2024-09-10 DIAGNOSIS — R06.02 SHORTNESS OF BREATH: Primary | ICD-10-CM

## 2024-09-10 RX ORDER — FLUTICASONE PROPIONATE AND SALMETEROL 100; 50 UG/1; UG/1
1 POWDER RESPIRATORY (INHALATION) EVERY 12 HOURS
Qty: 14 EACH | Refills: 3 | Status: SHIPPED | OUTPATIENT
Start: 2024-09-10

## 2024-10-25 ENCOUNTER — HOSPITAL ENCOUNTER (OUTPATIENT)
Dept: MAMMOGRAPHY | Facility: CLINIC | Age: 74
Discharge: HOME | End: 2024-10-25
Payer: COMMERCIAL

## 2024-10-25 DIAGNOSIS — Z12.31 VISIT FOR SCREENING MAMMOGRAM: ICD-10-CM

## 2024-10-25 PROCEDURE — 77063 BREAST TOMOSYNTHESIS BI: CPT

## 2024-12-21 ENCOUNTER — HEALTH MAINTENANCE LETTER (OUTPATIENT)
Age: 74
End: 2024-12-21

## 2025-01-27 DIAGNOSIS — R00.2 PALPITATIONS: ICD-10-CM

## 2025-01-27 RX ORDER — PROPRANOLOL HYDROCHLORIDE 10 MG/1
5-10 TABLET ORAL 2 TIMES DAILY PRN
Qty: 90 TABLET | Refills: 0 | Status: SHIPPED | OUTPATIENT
Start: 2025-01-27

## 2025-02-07 DIAGNOSIS — E78.2 MIXED HYPERLIPIDEMIA: ICD-10-CM

## 2025-02-08 RX ORDER — SIMVASTATIN 20 MG
20 TABLET ORAL AT BEDTIME
Qty: 90 TABLET | Refills: 2 | Status: SHIPPED | OUTPATIENT
Start: 2025-02-08

## 2025-03-22 ENCOUNTER — HEALTH MAINTENANCE LETTER (OUTPATIENT)
Age: 75
End: 2025-03-22

## 2025-04-29 ENCOUNTER — PATIENT OUTREACH (OUTPATIENT)
Dept: CARE COORDINATION | Facility: CLINIC | Age: 75
End: 2025-04-29
Payer: COMMERCIAL

## 2025-04-30 ENCOUNTER — E-VISIT (OUTPATIENT)
Dept: URGENT CARE | Facility: CLINIC | Age: 75
End: 2025-04-30
Payer: COMMERCIAL

## 2025-04-30 DIAGNOSIS — J01.90 ACUTE SINUSITIS WITH SYMPTOMS > 10 DAYS: Primary | ICD-10-CM

## 2025-04-30 PROCEDURE — 99207 PR NON-BILLABLE SERV PER CHARTING: CPT | Performed by: NURSE PRACTITIONER

## 2025-04-30 RX ORDER — AZITHROMYCIN 250 MG/1
TABLET, FILM COATED ORAL
Qty: 6 TABLET | Refills: 0 | Status: SHIPPED | OUTPATIENT
Start: 2025-04-30 | End: 2025-05-05

## 2025-04-30 RX ORDER — DOXYCYCLINE HYCLATE 100 MG
100 TABLET ORAL 2 TIMES DAILY
Qty: 14 TABLET | Refills: 0 | Status: SHIPPED | OUTPATIENT
Start: 2025-04-30 | End: 2025-04-30

## 2025-04-30 NOTE — PATIENT INSTRUCTIONS
Acute Sinusitis: Care Instructions  Overview     Acute sinusitis is an inflammation of the mucous membranes inside the nose and sinuses. Sinuses are the hollow spaces in your skull around the eyes and nose. Acute sinusitis often follows a cold. Acute sinusitis causes thick, discolored mucus that drains from the nose or down the back of the throat. It also can cause pain and pressure in your head and face along with a stuffy or blocked nose.  In most cases, sinusitis gets better on its own in 1 to 2 weeks. But some mild symptoms may last for several weeks. Sometimes antibiotics are needed if there is a bacterial infection.  Follow-up care is a key part of your treatment and safety. Be sure to make and go to all appointments, and call your doctor if you are having problems. It's also a good idea to know your test results and keep a list of the medicines you take.  How can you care for yourself at home?  Use saline (saltwater) nasal washes. This can help keep your nasal passages open and wash out mucus and allergens.  You can buy saline nose washes at a grocery store or drugstore. Follow the instructions on the package.  You can make your own at home. Add 1 teaspoon of non-iodized salt and 1 teaspoon of baking soda to 2 cups of distilled or boiled and cooled water. Fill a squeeze bottle or a nasal cleansing pot (such as a neti pot) with the nasal wash. Then put the tip into your nostril, and lean over the sink. With your mouth open, gently squirt the liquid. Repeat on the other side.  Try a decongestant nasal spray like oxymetazoline (Afrin). Do not use it for more than 3 days in a row. Using it for more than 3 days can make your congestion worse.  If needed, take an over-the-counter pain medicine, such as acetaminophen (Tylenol), ibuprofen (Advil, Motrin), or naproxen (Aleve). Read and follow all instructions on the label.  If the doctor prescribed antibiotics, take them as directed. Do not stop taking them just  "because you feel better. You need to take the full course of antibiotics.  Be careful when taking over-the-counter cold or flu medicines and Tylenol at the same time. Many of these medicines have acetaminophen, which is Tylenol. Read the labels to make sure that you are not taking more than the recommended dose. Too much acetaminophen (Tylenol) can be harmful.  Try a steroid nasal spray. It may help with your symptoms.  Breathe warm, moist air. You can use a steamy shower, a hot bath, or a sink filled with hot water. Avoid cold, dry air. Using a humidifier in your home may help. Follow the directions for cleaning the machine.  When should you call for help?   Call your doctor now or seek immediate medical care if:    You have new or worse swelling, redness, or pain in your face or around one or both of your eyes.     You have double vision or a change in your vision.     You have a high fever.     You have a severe headache and a stiff neck.     You have mental changes, such as feeling confused or much less alert.   Watch closely for changes in your health, and be sure to contact your doctor if:    You are not getting better as expected.   Where can you learn more?  Go to https://www.[employer].net/patiented  Enter I933 in the search box to learn more about \"Acute Sinusitis: Care Instructions.\"  Current as of: October 27, 2024  Content Version: 14.4    2528-7203 [employer].   Care instructions adapted under license by your healthcare professional. If you have questions about a medical condition or this instruction, always ask your healthcare professional. [employer] disclaims any warranty or liability for your use of this information.    Dear Carola Cox    After reviewing your responses, I've been able to diagnose you with Acute sinusitis with symptoms > 10 days.      Based on your responses and diagnosis, I have prescribed   Orders Placed This Encounter   Medications     doxycycline " hyclate (VIBRA-TABS) 100 MG tablet     Sig: Take 1 tablet (100 mg) by mouth 2 times daily for 7 days.     Dispense:  14 tablet     Refill:  0     May substitute any formulation of 100mg doxycycline available and best covered by insurance      to treat your symptoms. I have sent this to your pharmacy.?     It is also important to stay well hydrated, get lots of rest and take over-the-counter decongestants,?tylenol?or ibuprofen if you?are able to?take those medications per your primary care provider to help relieve discomfort.?     It is important that you take?all of?your prescribed medication even if your symptoms are improving after a few doses.? Taking?all of?your medicine helps prevent the symptoms from returning.?     If your symptoms worsen, you develop severe headache, vomiting, high fever (>102), or are not improving in 7 days, please contact your primary care provider for an appointment or visit any of our convenient Walk-in Care or Urgent Care Centers to be seen which can be found on our website?here.?     Thanks again for choosing?us?as your health care partner,?   ?  KENA FLORES CNP?   Thank you for choosing us for your care. I have placed an order for a prescription so that you can start treatment:  Orders Placed This Encounter   Medications     doxycycline hyclate (VIBRA-TABS) 100 MG tablet     Sig: Take 1 tablet (100 mg) by mouth 2 times daily for 7 days.     Dispense:  14 tablet     Refill:  0     May substitute any formulation of 100mg doxycycline available and best covered by insurance          View your full visit summary for details by clicking on the link below. Your pharmacist will able to address any questions you may have about the medication.     If you're not feeling better within 5-7 days, please schedule an appointment.  You can schedule an appointment right here in [facility], or call [phone]  If the visit is for the same symptoms as your eVisit, we'll refund the cost of  your eVisit if seen within seven days.

## 2025-05-02 ENCOUNTER — MYC MEDICAL ADVICE (OUTPATIENT)
Dept: INTERNAL MEDICINE | Facility: CLINIC | Age: 75
End: 2025-05-02
Payer: COMMERCIAL

## 2025-05-05 ENCOUNTER — OFFICE VISIT (OUTPATIENT)
Dept: FAMILY MEDICINE | Facility: CLINIC | Age: 75
End: 2025-05-05
Payer: COMMERCIAL

## 2025-05-05 VITALS
OXYGEN SATURATION: 98 % | SYSTOLIC BLOOD PRESSURE: 130 MMHG | RESPIRATION RATE: 16 BRPM | WEIGHT: 161.8 LBS | BODY MASS INDEX: 27.62 KG/M2 | HEIGHT: 64 IN | HEART RATE: 101 BPM | TEMPERATURE: 98.2 F | DIASTOLIC BLOOD PRESSURE: 72 MMHG

## 2025-05-05 DIAGNOSIS — J01.11 ACUTE RECURRENT FRONTAL SINUSITIS: Primary | ICD-10-CM

## 2025-05-05 PROCEDURE — 3075F SYST BP GE 130 - 139MM HG: CPT | Performed by: NURSE PRACTITIONER

## 2025-05-05 PROCEDURE — 99213 OFFICE O/P EST LOW 20 MIN: CPT | Performed by: NURSE PRACTITIONER

## 2025-05-05 PROCEDURE — 3078F DIAST BP <80 MM HG: CPT | Performed by: NURSE PRACTITIONER

## 2025-05-05 RX ORDER — GUAIFENESIN 200 MG/1
200 TABLET ORAL EVERY 4 HOURS PRN
COMMUNITY

## 2025-05-05 RX ORDER — CEFDINIR 300 MG/1
300 CAPSULE ORAL 2 TIMES DAILY
Qty: 14 CAPSULE | Refills: 0 | Status: SHIPPED | OUTPATIENT
Start: 2025-05-05 | End: 2025-05-12

## 2025-05-05 NOTE — TELEPHONE ENCOUNTER
Patient has appointment in 30 minutes to see provider about this.      May 05, 2025 1:00 PM  (Arrive by 12:40 PM)  Provider Visit with Kim Hanson CNP  [facility] ([facility] ) [phone]          Thank you,  Prabhakar, Triage RN [facility]    12:30 PM 5/5/2025

## 2025-05-05 NOTE — PROGRESS NOTES
"  Assessment & Plan     Acute recurrent frontal sinusitis  - cefdinir (OMNICEF) 300 MG capsule  Dispense: 14 capsule; Refill: 0            BMI  Estimated body mass index is 27.77 kg/m  as calculated from the following:    Height as of this encounter: 1.626 m (5' 4\").    Weight as of this encounter: 73.4 kg (161 lb 12.8 oz).             Jakob Arriaga is a 75 year old, presenting for the following health issues:  URI        5/5/2025    12:45 PM   Additional Questions   Roomed by Kisha DESIR     History of Present Illness       Reason for visit:  Flu symptoms for 2 wks . prescribed Zithromax last week  Symptom onset:  1-2 weeks ago  Symptoms include:  Congestion coughing fatigue not feeling well  Symptom intensity:  Moderate  Symptom progression:  Staying the same  Had these symptoms before:  No  What makes it worse:  No  What makes it better:  Zithromax has helped but not gone   She is taking medications regularly.        Review of Systems  Constitutional, HEENT, cardiovascular, pulmonary, GI, , musculoskeletal, neuro, skin, endocrine and psych systems are negative, except as otherwise noted.      Objective    /72   Pulse 101   Temp 98.2  F (36.8  C) (Tympanic)   Resp 16   Ht 1.626 m (5' 4\")   Wt 73.4 kg (161 lb 12.8 oz)   LMP  (LMP Unknown)   SpO2 98%   BMI 27.77 kg/m    Body mass index is 27.77 kg/m .  Physical Exam   GENERAL: alert and no distress  HENT: ear canals and TM's normal, nose and mouth without ulcers or lesions  NECK: bilateral anterior cervical adenopathy, no asymmetry, masses, or scars, and thyroid normal to palpation  RESP: lungs clear to auscultation - no rales, rhonchi or wheezes  CV: regular rate and rhythm, normal S1 S2, no S3 or S4, no murmur, click or rub, no peripheral edema  ABDOMEN: soft, nontender, no hepatosplenomegaly, no masses and bowel sounds normal  MS: no gross musculoskeletal defects noted, no edema            Signed Electronically by: Kim Hanson CNP    "

## 2025-06-09 ENCOUNTER — PATIENT OUTREACH (OUTPATIENT)
Dept: INTERNAL MEDICINE | Facility: CLINIC | Age: 75
End: 2025-06-09
Payer: COMMERCIAL

## 2025-06-09 PROBLEM — H90.3 BILATERAL SENSORINEURAL HEARING LOSS: Status: ACTIVE | Noted: 2025-06-09

## 2025-06-09 PROBLEM — H90.A22 SENSORINEURAL HEARING LOSS (SNHL) OF LEFT EAR WITH RESTRICTED HEARING OF RIGHT EAR: Status: ACTIVE | Noted: 2025-06-09

## 2025-06-09 NOTE — TELEPHONE ENCOUNTER
Patient Quality Outreach    Patient is due for the following:   Asthma  -  ACT needed and AAP  Physical Annual Wellness Visit      Topic Date Due    Zoster (Shingles) Vaccine (3 of 3) 10/26/2022    COVID-19 Vaccine (7 - 2024-25 season) 09/01/2024       Action(s) Taken:   No follow up needed at this time.    Type of outreach:    Chart review performed, no outreach needed.    Questions for provider review:    None         Rosie L Rule, LPN  Chart routed to self.

## 2025-06-19 ENCOUNTER — OFFICE VISIT (OUTPATIENT)
Dept: INTERNAL MEDICINE | Facility: CLINIC | Age: 75
End: 2025-06-19
Payer: COMMERCIAL

## 2025-06-19 VITALS
BODY MASS INDEX: 27.31 KG/M2 | TEMPERATURE: 97.6 F | HEIGHT: 64 IN | DIASTOLIC BLOOD PRESSURE: 81 MMHG | HEART RATE: 86 BPM | OXYGEN SATURATION: 99 % | SYSTOLIC BLOOD PRESSURE: 141 MMHG | WEIGHT: 160 LBS | RESPIRATION RATE: 20 BRPM

## 2025-06-19 DIAGNOSIS — M81.0 OSTEOPOROSIS WITHOUT CURRENT PATHOLOGICAL FRACTURE, UNSPECIFIED OSTEOPOROSIS TYPE: ICD-10-CM

## 2025-06-19 DIAGNOSIS — M17.12 PRIMARY OSTEOARTHRITIS OF LEFT KNEE: ICD-10-CM

## 2025-06-19 DIAGNOSIS — Z01.818 PRE-OP EXAM: Primary | ICD-10-CM

## 2025-06-19 DIAGNOSIS — E78.5 HYPERLIPIDEMIA, UNSPECIFIED HYPERLIPIDEMIA TYPE: ICD-10-CM

## 2025-06-19 LAB
ANION GAP SERPL CALCULATED.3IONS-SCNC: 8 MMOL/L (ref 7–15)
BUN SERPL-MCNC: 17.9 MG/DL (ref 8–23)
CALCIUM SERPL-MCNC: 10.2 MG/DL (ref 8.8–10.4)
CHLORIDE SERPL-SCNC: 103 MMOL/L (ref 98–107)
CREAT SERPL-MCNC: 0.81 MG/DL (ref 0.51–0.95)
EGFRCR SERPLBLD CKD-EPI 2021: 74 ML/MIN/1.73M2
ERYTHROCYTE [DISTWIDTH] IN BLOOD BY AUTOMATED COUNT: 12.9 % (ref 10–15)
GLUCOSE SERPL-MCNC: 93 MG/DL (ref 70–99)
HCO3 SERPL-SCNC: 28 MMOL/L (ref 22–29)
HCT VFR BLD AUTO: 45.4 % (ref 35–47)
HGB BLD-MCNC: 14.8 G/DL (ref 11.7–15.7)
MCH RBC QN AUTO: 30.2 PG (ref 26.5–33)
MCHC RBC AUTO-ENTMCNC: 32.6 G/DL (ref 31.5–36.5)
MCV RBC AUTO: 93 FL (ref 78–100)
PLATELET # BLD AUTO: 249 10E3/UL (ref 150–450)
POTASSIUM SERPL-SCNC: 4.8 MMOL/L (ref 3.4–5.3)
RBC # BLD AUTO: 4.9 10E6/UL (ref 3.8–5.2)
SODIUM SERPL-SCNC: 139 MMOL/L (ref 135–145)
WBC # BLD AUTO: 7.2 10E3/UL (ref 4–11)

## 2025-06-19 NOTE — PROGRESS NOTES
Preoperative Evaluation  [facility]  [address]  [address]  [facility] [address]  Phone: [phone]  Primary Provider: KENA Covarrubias CNP  Pre-op Performing Provider: KENA Covarrubias CNP  Jun 19, 2025 6/16/2025   Surgical Information   What procedure is being done? Left TKA    Facility or Hospital where procedure/surgery will be performed: [facility], [place]   Who is doing the procedure / surgery? Dr Morales Gómez   Date of surgery / procedure: July 10,2025   Time of surgery / procedure: TBD   Where do you plan to recover after surgery? at home with family     Fax number for surgical facility: [phone]    Assessment & Plan     The proposed surgical procedure is considered INTERMEDIATE risk.    (Z01.818) Pre-op exam  (primary encounter diagnosis)  Comment: Okay to proceed with procedure as planned    Plan: EKG 12-lead complete w/read - Clinics, CBC with        platelets, Basic metabolic panel  (Ca, Cl, CO2,        Creat, Gluc, K, Na, BUN), EKG 12-lead complete         w/read - Clinics            (M17.12) Primary osteoarthritis of left knee  Comment: History of left knee osteoarthritis. Plan for left TKA.  Plan:     (E78.5) Hyperlipidemia, unspecified hyperlipidemia type  Comment: Stable.  Plan:     (M81.0) Osteoporosis without current pathological fracture, unspecified osteoporosis type  Comment: Stable. Currently on drug holiday.  Plan:       Possible Sleep Apnea: Has Dental Appliance at home for JUSTINE      The longitudinal plan of care for the diagnosis(es)/condition(s) as documented were addressed during this visit. Due to the added complexity in care, I will continue to support Corina in the subsequent management and with ongoing continuity of care.       - No identified additional risk factors other than previously addressed    Antiplatelet or Anticoagulation Medication Instructions   - We reviewed the medication list and the patient is not on an  antiplatelet or anticoagulation medications.    Additional Medication Instructions  Take all scheduled medications on the day of surgery EXCEPT for modifications listed below:   - Herbal medications and vitamins: DO NOT TAKE 14 days prior to surgery.    Recommendation  Approval given to proceed with proposed procedure, without further diagnostic evaluation.      Jakob Arriaga is a 75 year old, presenting for the following:  Pre-Op Exam          6/19/2025     9:22 AM   Additional Questions   Roomed by Kirsten MANNING: Pt with history of PVC's, asthma and hyperlipidemia presents for pre operative exam for left TKA.           6/16/2025   Pre-Op Questionnaire   Have you ever had a heart attack or stroke? No   Have you ever had surgery on your heart or blood vessels, such as a stent placement, a coronary artery bypass, or surgery on an artery in your head, neck, heart, or legs? No   Do you have chest pain with activity? No   Do you have a history of heart failure? No   Do you currently have a cold, bronchitis or symptoms of other infection? No   Do you have a cough, shortness of breath, or wheezing? No   Do you or anyone in your family have previous history of blood clots? No   Do you or does anyone in your family have a serious bleeding problem such as prolonged bleeding following surgeries or cuts? No   Have you ever had problems with anemia or been told to take iron pills? (!) YES- 50+ years ago. This has since resolved    Have you had any abnormal blood loss such as black, tarry or bloody stools, or abnormal vaginal bleeding? No   Have you ever had a blood transfusion? No   Are you willing to have a blood transfusion if it is medically needed before, during, or after your surgery? Yes   Have you or any of your relatives ever had problems with anesthesia? (!) YES - very sensitive to anesthesia. Respiratory rate drops quickly, she often suffers from nausea, and has a difficult time week    Do you have sleep apnea,  excessive snoring or daytime drowsiness? (!) YES- mild JUSTINE    Do you have a CPAP machine? (!) No CPAP machine but has dental appliance for JUSTINE    Do you have any artifical heart valves or other implanted medical devices like a pacemaker, defibrillator, or continuous glucose monitor? No   Do you have artificial joints? No   Are you allergic to latex? No     Advance Care Planning    Discussed advance care planning with patient; however, patient declined at this time.    Preoperative Review of    reviewed - controlled substances prescribed by other outside provider(s).        Patient Active Problem List    Diagnosis Date Noted    Bilateral sensorineural hearing loss 06/09/2025     Priority: Medium    Sensorineural hearing loss (SNHL) of left ear with restricted hearing of right ear 06/09/2025     Priority: Medium    Kidney stone 03/30/2021     Priority: Medium     Added automatically from request for surgery [id number]      Stone, kidney 11/03/2020     Priority: Medium    Nephrolithiasis 09/18/2020     Priority: Medium     Added automatically from request for surgery [id number]      Kidney stones 08/05/2020     Priority: Medium    H/O wheezing 11/13/2017     Priority: Medium     Allergy induced, takes albuterol prn       Hyperlipidemia      Priority: Medium    Cardiac dysrhythmia      Priority: Medium     beta blocker for this in past      Atrophic vaginitis      Priority: Medium    Osteoporosis      Priority: Medium    Generalized anxiety disorder      Priority: Medium     cannot tolerate SSRIs. Trial of 12.5mg sertraline and had severe nausea, diarrhea, dizziness. low dose propranolol prn and counseling       BPPV (benign paroxysmal positional vertigo)      Priority: Medium    CARDIOVASCULAR SCREENING; LDL GOAL LESS THAN 160 12/31/2013     Priority: Medium    Anxiety state 08/06/2010     Priority: Medium     Panic      Asthma 08/06/2010     Priority: Medium    GERD (gastroesophageal reflux disease) 08/06/2010      Priority: Medium    Nontoxic uninodular goiter 08/06/2010     Priority: Medium    Seasonal allergies 08/06/2010     Priority: Medium      Past Medical History:   Diagnosis Date    Arthritis 2010    Atrophic vaginitis     BPPV (benign paroxysmal positional vertigo)     Cardiac dysrhythmia     beta blocker for this in past    Complication of anesthesia     Endometriosis of pelvic peritoneum     Generalized anxiety disorder 2014    cannot tolerate SSRIs. Trial of 12.5mg sertraline and had severe nausea, diarrhea, dizziness. low dose propranolol prn and counseling     History of postmenopausal HRT     Hyperlipidemia     Mumps     Osteoporosis     Palpitations     PONV (postoperative nausea and vomiting)      Past Surgical History:   Procedure Laterality Date    ABDOMEN SURGERY  1976    APPENDECTOMY  1976    COLONOSCOPY      COMBINED CYSTOSCOPY, INSERT STENT URETER(S) Right 11/03/2020    Procedure: Flexible cystoscopy with right sided ureteral catheter and stent placement;  Surgeon: Chip Clark MD;  Location:  OR    COMBINED CYSTOSCOPY, INSERT STENT URETER(S) Left 05/13/2021    Procedure: CYSTOSCOPY, PLACEMENT LEFT URETERAL STENT INSERTION;  Surgeon: Chip Clark MD;  Location:  OR    CYSTOSCOPY      EXTRACORPOREAL SHOCK WAVE LITHOTRIPSY, CYSTOSCOPY, INSERT STENT URETER(S), COMBINED Bilateral 05/13/2021    Procedure: BILATERAL EXTRACORPOREAL SHOCK WAVE LITHOTRIPSY(ESWL);  Surgeon: Chip Clark MD;  Location:  OR    EYE SURGERY  8/19    cataracts    HYSTERECTOMY TOTAL ABDOMINAL, BILATERAL SALPINGO-OOPHORECTOMY, COMBINED      Endometriosis.    IR RENAL STONE REMOVAL RIGHT  11/03/2020    LASER HOLMIUM NEPHROLITHOTOMY VIA PERCUTANEOUS NEPHROSTOMY Right 11/03/2020    Procedure: Laser holmium nephrolithotomy via percutaneous nephrostomy, stent insertion;  Surgeon: Chip Clark MD;  Location:  OR    LITHOTRIPSY      PERCUTANEOUS NEPHROLITHOTOMY Right 11/03/2020    Procedure:  "Right percutaneous nephrolithotomy, Holmium laser lithotripsy, stent placement;  Surgeon: Chip Clark MD;  Location:  OR     Current Outpatient Medications   Medication Sig Dispense Refill    acetaminophen (TYLENOL) 500 MG tablet Take 500-1,000 mg by mouth every 6 hours as needed for mild pain      fluticasone-salmeterol (ADVAIR) 100-50 MCG/ACT inhaler Inhale 1 puff into the lungs every 12 hours. 14 each 3    ibuprofen (ADVIL/MOTRIN) 200 MG tablet Take 600 mg by mouth daily as needed for pain.      propranolol (INDERAL) 10 MG tablet TAKE 0.5-1 TABLETS BY MOUTH 2 TIMES DAILY AS NEEDED (PVCS). 180 tablet 1    simvastatin (ZOCOR) 20 MG tablet TAKE 1 TABLET(20 MG) BY MOUTH AT BEDTIME 90 tablet 2       Allergies   Allergen Reactions    Cefprozil Hives     Cefzil    Amlodipine Palpitations    Penicillin V Rash     As a young adult      Tamsulosin Dizziness        Social History     Tobacco Use    Smoking status: Former     Current packs/day: 0.00     Average packs/day: 0.5 packs/day for 13.0 years (6.5 ttl pk-yrs)     Types: Cigarettes     Start date: 1966     Quit date: 1979     Years since quittin.4     Passive exposure: Past    Smokeless tobacco: Never   Substance Use Topics    Alcohol use: Not Currently       History   Drug Use No             Review of Systems  CONSTITUTIONAL: NEGATIVE for fever, chills  RESP: NEGATIVE for significant cough or SOB  CV: NEGATIVE for chest pain, palpitations or peripheral edema    Objective    BP (!) 141/81   Pulse 86   Temp 97.6  F (36.4  C) (Tympanic)   Resp 20   Ht 1.613 m (5' 3.5\")   Wt 72.6 kg (160 lb)   LMP  (LMP Unknown)   SpO2 99%   BMI 27.90 kg/m     Estimated body mass index is 27.9 kg/m  as calculated from the following:    Height as of this encounter: 1.613 m (5' 3.5\").    Weight as of this encounter: 72.6 kg (160 lb).      Physical Exam  Constitutional:       General: She is not in acute distress.     Appearance: Normal appearance. She is " not ill-appearing, toxic-appearing or diaphoretic.   HENT:      Head: Normocephalic and atraumatic.   Eyes:      Conjunctiva/sclera: Conjunctivae normal.   Cardiovascular:      Rate and Rhythm: Normal rate and regular rhythm.      Heart sounds: Normal heart sounds.   Pulmonary:      Effort: Pulmonary effort is normal.      Breath sounds: Normal breath sounds.   Skin:     General: Skin is warm and dry.   Neurological:      Mental Status: She is alert and oriented to person, place, and time.   Psychiatric:         Mood and Affect: Mood normal.         Behavior: Behavior normal.         Thought Content: Thought content normal.         Judgment: Judgment normal.       Recent Labs   Lab Test 09/09/24  0755      POTASSIUM 4.2   CR 0.85        Diagnostics  Labs pending at this time.  Results will be reviewed when available.   EKG: appears normal, NSR, normal axis, normal intervals, no acute ST/T changes c/w ischemia, no LVH by voltage criteria, unchanged from previous tracings    Revised Cardiac Risk Index (RCRI)  The patient has the following serious cardiovascular risks for perioperative complications:   - No serious cardiac risks = 0 points     RCRI Interpretation: 0 points: Class I (very low risk - 0.4% complication rate)         Signed Electronically by: KENA Covarrubias CNP  A copy of this evaluation report is provided to the requesting physician.

## 2025-06-23 ENCOUNTER — RESULTS FOLLOW-UP (OUTPATIENT)
Dept: INTERNAL MEDICINE | Facility: CLINIC | Age: 75
End: 2025-06-23

## 2025-08-26 DIAGNOSIS — R00.2 PALPITATIONS: ICD-10-CM

## 2025-08-26 RX ORDER — PROPRANOLOL HYDROCHLORIDE 10 MG/1
5-10 TABLET ORAL 2 TIMES DAILY PRN
Qty: 180 TABLET | Refills: 2 | Status: SHIPPED | OUTPATIENT
Start: 2025-08-26

## (undated) DEVICE — Device

## (undated) DEVICE — PAD CHUX UNDERPAD 23X24" 7136

## (undated) DEVICE — GUIDEWIRE TERUMO .035X180 ANG GR3508

## (undated) DEVICE — TUBING EXTENSION SET 20" B1327

## (undated) DEVICE — WIPES FOLEY CARE SURESTEP PROVON DFC100

## (undated) DEVICE — GUIDEWIRE AMPLATZ SUPER STIFF 0.035"X180CM M001465250

## (undated) DEVICE — SOL WATER IRRIG 1000ML BOTTLE 2F7114

## (undated) DEVICE — PACK SPECIAL PROCEDURE CUSTOM

## (undated) DEVICE — TUBING SUCTION 12"X1/4" N612

## (undated) DEVICE — GLOVE PROTEXIS W/NEU-THERA 8.0  2D73TE80

## (undated) DEVICE — TUBING IRRIG TUR Y TYPE 96" LF 6543-01

## (undated) DEVICE — CATH URETERAL FLEX TIP TIGERTAIL 06FRX70CM 139006

## (undated) DEVICE — DRAPE POUCH IRR 1016

## (undated) DEVICE — CATH BALLOON DILATION X FORCE 30FR 10MMX15CM 996101

## (undated) DEVICE — PROBE SET CYBERWAND LITHO GYRUS RENAL/BLADDER CW-RBP

## (undated) DEVICE — TAPE DRSG UNIVERSAL CLOTH 3" WHITE LATEX 881-3

## (undated) DEVICE — CATH ANGIO IMPRESS 5FRX65CM BERENSTEIN 56538BER

## (undated) DEVICE — MANIFOLD NEPTUNE 4 PORT 700-20

## (undated) DEVICE — BASKET STONE RETRIEVAL SEGURA 2.4FRX120CM M0063801070

## (undated) DEVICE — SOL NACL 0.9% IRRIG 1000ML BOTTLE 2F7124

## (undated) DEVICE — POSITIONER PT PRONESAFE HEAD REST W/DERMAPROX INSERT 40599

## (undated) DEVICE — GLOVE PROTEXIS W/NEU-THERA 7.5  2D73TE75

## (undated) DEVICE — TAPE MEDIPORE 4"X10YD 2964

## (undated) DEVICE — RAD RX ISOVUE 300 (150ML) 61% IOPAMIDOL 300MG/ML CHRG PER ML

## (undated) DEVICE — SOL WATER IRRIG 3000ML BAG 2B7117

## (undated) DEVICE — TUBING IRRIG CYSTO/BLADDER SET 81" LF 2C4040

## (undated) DEVICE — LASER FIBER HOLMIUM FLEXIVA 200UM M0068403910 840-391

## (undated) DEVICE — COVER PROBE ULTRASOUND W/GEL 6X96" 20-P3D696

## (undated) DEVICE — SU SILK 0 24" TIE SA76G

## (undated) DEVICE — LINEN TOWEL PACK X5 5464

## (undated) DEVICE — GUIDEWIRE URO STR STIFF .035"X150CM NITINOL 150NSS35

## (undated) DEVICE — GOWN XXLG REINFORCED 9071EL

## (undated) DEVICE — DRAPE NEURO TIBURON W/XL FLUID CONTROL POUCH

## (undated) DEVICE — PACK CYSTOSCOPY SBA15CYFSI

## (undated) DEVICE — DRAPE SHEET REV FOLD 3/4 9349

## (undated) DEVICE — INTRO KIT ACUSTICK II 21GA .018-.038" 20-703

## (undated) DEVICE — GUIDEWIRE ZIPWIRE STR STIFF .035"X150CM M006630222B0

## (undated) DEVICE — CATH ANGIO ANG GLIDE 5FRX65CM CG507

## (undated) DEVICE — SU SILK 0 FSL 18" 678H

## (undated) DEVICE — DRSG ABDOMINAL 07 1/2X8" 7197D

## (undated) DEVICE — CATH FOLEY COUNCIL 22FR 5ML LATEX 0196SI22

## (undated) RX ORDER — FENTANYL CITRATE 50 UG/ML
INJECTION, SOLUTION INTRAMUSCULAR; INTRAVENOUS
Status: DISPENSED
Start: 2020-11-03

## (undated) RX ORDER — PROPOFOL 10 MG/ML
INJECTION, EMULSION INTRAVENOUS
Status: DISPENSED
Start: 2020-11-03

## (undated) RX ORDER — ATROPA BELLADONNA AND OPIUM 16.2; 3 MG/1; MG/1
SUPPOSITORY RECTAL
Status: DISPENSED
Start: 2021-05-13

## (undated) RX ORDER — ONDANSETRON 2 MG/ML
INJECTION INTRAMUSCULAR; INTRAVENOUS
Status: DISPENSED
Start: 2021-05-13

## (undated) RX ORDER — LIDOCAINE HYDROCHLORIDE 20 MG/ML
INJECTION, SOLUTION EPIDURAL; INFILTRATION; INTRACAUDAL; PERINEURAL
Status: DISPENSED
Start: 2020-11-03

## (undated) RX ORDER — APREPITANT 40 MG/1
CAPSULE ORAL
Status: DISPENSED
Start: 2021-05-13

## (undated) RX ORDER — SCOLOPAMINE TRANSDERMAL SYSTEM 1 MG/1
PATCH, EXTENDED RELEASE TRANSDERMAL
Status: DISPENSED
Start: 2020-11-03

## (undated) RX ORDER — APREPITANT 40 MG/1
CAPSULE ORAL
Status: DISPENSED
Start: 2020-11-03

## (undated) RX ORDER — FENTANYL CITRATE 50 UG/ML
INJECTION, SOLUTION INTRAMUSCULAR; INTRAVENOUS
Status: DISPENSED
Start: 2021-05-13

## (undated) RX ORDER — DEXAMETHASONE SODIUM PHOSPHATE 4 MG/ML
INJECTION, SOLUTION INTRA-ARTICULAR; INTRALESIONAL; INTRAMUSCULAR; INTRAVENOUS; SOFT TISSUE
Status: DISPENSED
Start: 2021-05-13

## (undated) RX ORDER — PROPOFOL 10 MG/ML
INJECTION, EMULSION INTRAVENOUS
Status: DISPENSED
Start: 2021-05-13

## (undated) RX ORDER — ONDANSETRON 2 MG/ML
INJECTION INTRAMUSCULAR; INTRAVENOUS
Status: DISPENSED
Start: 2020-11-03

## (undated) RX ORDER — CEFAZOLIN SODIUM 2 G/100ML
INJECTION, SOLUTION INTRAVENOUS
Status: DISPENSED
Start: 2020-11-03

## (undated) RX ORDER — DEXAMETHASONE SODIUM PHOSPHATE 4 MG/ML
INJECTION, SOLUTION INTRA-ARTICULAR; INTRALESIONAL; INTRAMUSCULAR; INTRAVENOUS; SOFT TISSUE
Status: DISPENSED
Start: 2020-11-03